# Patient Record
Sex: FEMALE | Race: WHITE | NOT HISPANIC OR LATINO | Employment: FULL TIME | URBAN - METROPOLITAN AREA
[De-identification: names, ages, dates, MRNs, and addresses within clinical notes are randomized per-mention and may not be internally consistent; named-entity substitution may affect disease eponyms.]

---

## 2018-11-07 ENCOUNTER — APPOINTMENT (OUTPATIENT)
Dept: CT IMAGING | Facility: HOSPITAL | Age: 28
End: 2018-11-07

## 2018-11-07 ENCOUNTER — HOSPITAL ENCOUNTER (EMERGENCY)
Facility: HOSPITAL | Age: 28
Discharge: HOME | End: 2018-11-07
Attending: EMERGENCY MEDICINE | Admitting: EMERGENCY MEDICINE

## 2018-11-07 VITALS
RESPIRATION RATE: 18 BRPM | TEMPERATURE: 97.6 F | WEIGHT: 172 LBS | BODY MASS INDEX: 28.66 KG/M2 | SYSTOLIC BLOOD PRESSURE: 118 MMHG | DIASTOLIC BLOOD PRESSURE: 69 MMHG | OXYGEN SATURATION: 99 % | HEART RATE: 95 BPM | HEIGHT: 65 IN

## 2018-11-07 DIAGNOSIS — E87.6 HYPOKALEMIA: ICD-10-CM

## 2018-11-07 DIAGNOSIS — R10.32 LEFT LOWER QUADRANT PAIN: Primary | ICD-10-CM

## 2018-11-07 LAB
ALBUMIN SERPL-MCNC: 4.2 G/DL (ref 3.5–5.2)
ALBUMIN/GLOB SERPL: 1.8 G/DL
ALP SERPL-CCNC: 90 U/L (ref 40–129)
ALT SERPL W P-5'-P-CCNC: 10 U/L (ref 5–33)
ANION GAP SERPL CALCULATED.3IONS-SCNC: 11.4 MMOL/L
AST SERPL-CCNC: 14 U/L (ref 5–32)
B-HCG UR QL: NEGATIVE
BACTERIA UR QL AUTO: ABNORMAL /HPF
BASOPHILS # BLD AUTO: 0.04 10*3/MM3 (ref 0–0.2)
BASOPHILS NFR BLD AUTO: 0.6 % (ref 0–2)
BILIRUB SERPL-MCNC: 0.6 MG/DL (ref 0.2–1.2)
BILIRUB UR QL STRIP: NEGATIVE
BUN BLD-MCNC: 5 MG/DL (ref 6–20)
BUN/CREAT SERPL: 6.5 (ref 7–25)
CALCIUM SPEC-SCNC: 8.7 MG/DL (ref 8.6–10.5)
CHLORIDE SERPL-SCNC: 97 MMOL/L (ref 98–107)
CLARITY UR: ABNORMAL
CO2 SERPL-SCNC: 26.6 MMOL/L (ref 22–29)
COLOR UR: ABNORMAL
CREAT BLD-MCNC: 0.77 MG/DL (ref 0.57–1)
DEPRECATED RDW RBC AUTO: 41.1 FL (ref 37–54)
EOSINOPHIL # BLD AUTO: 0.11 10*3/MM3 (ref 0.1–0.3)
EOSINOPHIL NFR BLD AUTO: 1.6 % (ref 0–4)
ERYTHROCYTE [DISTWIDTH] IN BLOOD BY AUTOMATED COUNT: 12.2 % (ref 11.5–14.5)
GFR SERPL CREATININE-BSD FRML MDRD: 91 ML/MIN/1.73
GLOBULIN UR ELPH-MCNC: 2.3 GM/DL
GLUCOSE BLD-MCNC: 84 MG/DL (ref 65–99)
GLUCOSE UR STRIP-MCNC: NEGATIVE MG/DL
HCT VFR BLD AUTO: 38.6 % (ref 37–47)
HGB BLD-MCNC: 12.6 G/DL (ref 12–16)
HGB UR QL STRIP.AUTO: NEGATIVE
HYALINE CASTS UR QL AUTO: ABNORMAL /LPF
IMM GRANULOCYTES # BLD: 0.02 10*3/MM3 (ref 0–0.03)
IMM GRANULOCYTES NFR BLD: 0.3 % (ref 0–0.5)
KETONES UR QL STRIP: ABNORMAL
LEUKOCYTE ESTERASE UR QL STRIP.AUTO: NEGATIVE
LYMPHOCYTES # BLD AUTO: 1.44 10*3/MM3 (ref 0.6–4.8)
LYMPHOCYTES NFR BLD AUTO: 21.1 % (ref 20–45)
MCH RBC QN AUTO: 29.8 PG (ref 27–31)
MCHC RBC AUTO-ENTMCNC: 32.6 G/DL (ref 31–37)
MCV RBC AUTO: 91.3 FL (ref 81–99)
MONOCYTES # BLD AUTO: 0.58 10*3/MM3 (ref 0–1)
MONOCYTES NFR BLD AUTO: 8.5 % (ref 3–8)
NEUTROPHILS # BLD AUTO: 4.64 10*3/MM3 (ref 1.5–8.3)
NEUTROPHILS NFR BLD AUTO: 67.9 % (ref 45–70)
NITRITE UR QL STRIP: NEGATIVE
NRBC BLD MANUAL-RTO: 0 /100 WBC (ref 0–0)
PH UR STRIP.AUTO: 7 [PH] (ref 4.5–8)
PLATELET # BLD AUTO: 247 10*3/MM3 (ref 140–500)
PMV BLD AUTO: 9.8 FL (ref 7.4–10.4)
POTASSIUM BLD-SCNC: 3.4 MMOL/L (ref 3.5–5.2)
PROT SERPL-MCNC: 6.5 G/DL (ref 6–8.5)
PROT UR QL STRIP: ABNORMAL
RBC # BLD AUTO: 4.23 10*6/MM3 (ref 4.2–5.4)
RBC # UR: ABNORMAL /HPF
REF LAB TEST METHOD: ABNORMAL
SODIUM BLD-SCNC: 135 MMOL/L (ref 136–145)
SP GR UR STRIP: 1.02 (ref 1–1.03)
SQUAMOUS #/AREA URNS HPF: ABNORMAL /HPF
UROBILINOGEN UR QL STRIP: ABNORMAL
WBC NRBC COR # BLD: 6.83 10*3/MM3 (ref 4.8–10.8)
WBC UR QL AUTO: ABNORMAL /HPF

## 2018-11-07 PROCEDURE — 85025 COMPLETE CBC W/AUTO DIFF WBC: CPT | Performed by: PHYSICIAN ASSISTANT

## 2018-11-07 PROCEDURE — 0 IOPAMIDOL PER 1 ML: Performed by: EMERGENCY MEDICINE

## 2018-11-07 PROCEDURE — 99284 EMERGENCY DEPT VISIT MOD MDM: CPT

## 2018-11-07 PROCEDURE — 99284 EMERGENCY DEPT VISIT MOD MDM: CPT | Performed by: PHYSICIAN ASSISTANT

## 2018-11-07 PROCEDURE — 80053 COMPREHEN METABOLIC PANEL: CPT | Performed by: PHYSICIAN ASSISTANT

## 2018-11-07 PROCEDURE — 74177 CT ABD & PELVIS W/CONTRAST: CPT

## 2018-11-07 PROCEDURE — 81025 URINE PREGNANCY TEST: CPT | Performed by: PHYSICIAN ASSISTANT

## 2018-11-07 PROCEDURE — 81001 URINALYSIS AUTO W/SCOPE: CPT | Performed by: PHYSICIAN ASSISTANT

## 2018-11-07 RX ORDER — POTASSIUM CHLORIDE 20 MEQ/1
20 TABLET, EXTENDED RELEASE ORAL DAILY
Status: DISCONTINUED | OUTPATIENT
Start: 2018-11-07 | End: 2018-11-07 | Stop reason: HOSPADM

## 2018-11-07 RX ORDER — VENLAFAXINE 25 MG/1
TABLET ORAL DAILY
COMMUNITY
End: 2018-11-07

## 2018-11-07 RX ORDER — BUPROPION HYDROCHLORIDE 75 MG/1
75 TABLET ORAL DAILY
COMMUNITY
End: 2020-04-13

## 2018-11-07 RX ORDER — NAPROXEN 375 MG/1
375 TABLET ORAL 2 TIMES DAILY PRN
Qty: 20 TABLET | Refills: 0 | Status: SHIPPED | OUTPATIENT
Start: 2018-11-07 | End: 2020-04-13

## 2018-11-07 RX ORDER — SODIUM CHLORIDE 0.9 % (FLUSH) 0.9 %
10 SYRINGE (ML) INJECTION AS NEEDED
Status: DISCONTINUED | OUTPATIENT
Start: 2018-11-07 | End: 2018-11-07 | Stop reason: HOSPADM

## 2018-11-07 RX ORDER — VENLAFAXINE HYDROCHLORIDE 75 MG/1
75 CAPSULE, EXTENDED RELEASE ORAL EVERY MORNING
COMMUNITY
End: 2022-05-25

## 2018-11-07 RX ADMIN — IOPAMIDOL 100 ML: 755 INJECTION, SOLUTION INTRAVENOUS at 15:08

## 2018-11-07 RX ADMIN — POTASSIUM CHLORIDE 20 MEQ: 1500 TABLET, EXTENDED RELEASE ORAL at 16:26

## 2018-11-07 NOTE — ED PROVIDER NOTES
Subjective   History of Present Illness  History of Present Illness    Chief complaint: abd pain    Location: LLQ with radiation to low back    Quality/Severity:  Ache, moderate    Timing/Duration: 0730 this am    Modifying Factors: Nothing specific makes worse or better    Associated Symptoms: Positive nausea, no vomiting.  No change in bowels.  Last bowel movement was 2 days ago, which is not abnormal.  Denies dysuria, hematuria, frequency or urgency.  Patient has not yet urinated today.  Denies any rectal bleeding.  Denies fevers or chills.    Narrative: 27-year-old female presents with lower abdominal pain that started this morning.  She was wondering if maybe she had an ovarian cyst or a kidney stone.  She stated the pain became so severe that she had to stop working and comes to the ER.  She denies any trauma.    Review of Systems  General: Denies fevers or chills.  Denies any weakness or fatigue.  Denies any weight loss or weight gain.  SKIN: Denies any rashes lesions or ulcers.  Denies color change.  ENT: Denies sore throat or rhinorrhea.  Denies ear pain.    EYES: Denies any blurred vision.  Denies any change in vision.  Denies any photophobia.  Denies any vision loss.  LUNGS: Denies any shortness of breath or wheezing.  Denies any cough.  Denies any hemoptysis.  CARDIAC: Denies any chest pain.  Denies palpitations.  Denies syncope.  Denies any edema  ABD: +abdominal pain. + nausea.  Denies vomiting or diarrhea.  Denies any rectal bleeding.  Denies constipation  : Denies any dysuria, urgency, frequency or hematuria.  Denies discharge.  Denies flank pain.  NEURO: Denies any focal weakness.  Denies headache.  Denies seizures.  Denies changes in speech or difficulty walking.  ENDOCRINE: Denies polydipsia and polyuria  M/S: back pain as above. Denies arthralgias, myalgias or neck pain  HEME/LYMPH: Negative for adenopathy. Does not bruise/bleed easily.   PSYCH: Negative for suicidal ideas. Denies anxiety or  depression  review was performed in addition to those in the above all other reviews are negative.      Past Medical History:   Diagnosis Date   • Anxiety    • Depression        No Known Allergies    Past Surgical History:   Procedure Laterality Date   • TONSILLECTOMY         History reviewed. No pertinent family history.    Social History     Social History   • Marital status:      Social History Main Topics   • Smoking status: Current Every Day Smoker     Packs/day: 0.50   • Smokeless tobacco: Never Used   • Alcohol use Yes      Comment: monthly    • Drug use: No   • Sexual activity: Yes     Other Topics Concern   • Not on file     No current facility-administered medications for this encounter.     Current Outpatient Prescriptions:   •  buPROPion (WELLBUTRIN) 75 MG tablet, Take 75 mg by mouth Daily. Pt unsure if this is correct dosage, Disp: , Rfl:   •  venlafaxine XR (EFFEXOR-XR) 75 MG 24 hr capsule, Take 75 mg by mouth Daily., Disp: , Rfl:         Objective   Physical Exam  Vitals:    11/07/18 1345   BP: 116/75   Pulse: 91   Resp: 15   Temp: 97.6 °F (36.4 °C)   SpO2: 98%     GENERAL: Alert and oriented ×4.  No apparent distress.  SKIN: Warm, pink and dry  HEENT: Atraumatic normocephalic  LUNGS: Clear to auscultation bilaterally without wheezes, rales or rhonchi  CARDIAC: Regular rate and rhythm.  S1 and S2.  No murmurs, rubs or gallops.  ABD: Soft, positive suprapubic and severe left lower quadrant abdominal pain. + mild voluntary guarding LLQ. No rebound tenderness.  No CVA tenderness.  Normal bowel sounds.  M/S: MAEW, no deformity  PSYCH: Normal mood and affect      Procedures           ED Course  ED Course as of Nov 07 1442 Wed Nov 07, 2018   1441 contaminated Squamous Epithelial Cells, UA: (!) 13-20 [[place]]      ED Course User Index  [[place]] Carin Llamas, PAJoriC        Results for orders placed or performed during the hospital encounter of 11/07/18   Urinalysis With Microscopic If Indicated (No  Culture) - Urine, Clean Catch   Result Value Ref Range    Color, UA Dark Yellow (A) Yellow, Straw    Appearance, UA Slightly Cloudy (A) Clear    pH, UA 7.0 4.5 - 8.0    Specific Gravity, UA 1.020 1.003 - 1.030    Glucose, UA Negative Negative    Ketones, UA Trace (A) Negative, 80 mg/dL (3+), >=160 mg/dL (4+)    Bilirubin, UA Negative Negative    Blood, UA Negative Negative    Protein, UA 30 mg/dL (1+) (A) Negative    Leuk Esterase, UA Negative Negative    Nitrite, UA Negative Negative    Urobilinogen, UA 0.2 E.U./dL 0.2 - 1.0 E.U./dL   Pregnancy, Urine - Urine, Clean Catch   Result Value Ref Range    HCG, Urine QL Negative Negative   Urinalysis, Microscopic Only - Urine, Clean Catch   Result Value Ref Range    RBC, UA None Seen None Seen /HPF    WBC, UA None Seen None Seen /HPF    Bacteria, UA 1+ (A) None Seen /HPF    Squamous Epithelial Cells, UA 13-20 (A) None Seen, 0-2 /HPF    Hyaline Casts, UA None Seen None Seen /LPF    Methodology Manual Light Microscopy    Comprehensive Metabolic Panel   Result Value Ref Range    Glucose 84 65 - 99 mg/dL    BUN 5 (L) 6 - 20 mg/dL    Creatinine 0.77 0.57 - 1.00 mg/dL    Sodium 135 (L) 136 - 145 mmol/L    Potassium 3.4 (L) 3.5 - 5.2 mmol/L    Chloride 97 (L) 98 - 107 mmol/L    CO2 26.6 22.0 - 29.0 mmol/L    Calcium 8.7 8.6 - 10.5 mg/dL    Total Protein 6.5 6.0 - 8.5 g/dL    Albumin 4.20 3.50 - 5.20 g/dL    ALT (SGPT) 10 5 - 33 U/L    AST (SGOT) 14 5 - 32 U/L    Alkaline Phosphatase 90 40 - 129 U/L    Total Bilirubin 0.6 0.2 - 1.2 mg/dL    eGFR Non African Amer 91 >60 mL/min/1.73    Globulin 2.3 gm/dL    A/G Ratio 1.8 g/dL    BUN/Creatinine Ratio 6.5 (L) 7.0 - 25.0    Anion Gap 11.4 mmol/L   CBC Auto Differential   Result Value Ref Range    WBC 6.83 4.80 - 10.80 10*3/mm3    RBC 4.23 4.20 - 5.40 10*6/mm3    Hemoglobin 12.6 12.0 - 16.0 g/dL    Hematocrit 38.6 37.0 - 47.0 %    MCV 91.3 81.0 - 99.0 fL    MCH 29.8 27.0 - 31.0 pg    MCHC 32.6 31.0 - 37.0 g/dL    RDW 12.2 11.5 - 14.5 %     RDW-SD 41.1 37.0 - 54.0 fl    MPV 9.8 7.4 - 10.4 fL    Platelets 247 140 - 500 10*3/mm3    Neutrophil % 67.9 45.0 - 70.0 %    Lymphocyte % 21.1 20.0 - 45.0 %    Monocyte % 8.5 (H) 3.0 - 8.0 %    Eosinophil % 1.6 0.0 - 4.0 %    Basophil % 0.6 0.0 - 2.0 %    Immature Grans % 0.3 0.0 - 0.5 %    Neutrophils, Absolute 4.64 1.50 - 8.30 10*3/mm3    Lymphocytes, Absolute 1.44 0.60 - 4.80 10*3/mm3    Monocytes, Absolute 0.58 0.00 - 1.00 10*3/mm3    Eosinophils, Absolute 0.11 0.10 - 0.30 10*3/mm3    Basophils, Absolute 0.04 0.00 - 0.20 10*3/mm3    Immature Grans, Absolute 0.02 0.00 - 0.03 10*3/mm3    nRBC 0.0 0.0 - 0.0 /100 WBC     Reviewed CT a/p. Independently viewed by me. Interpreted by radiologist. Discussed with pt.  Ct Abdomen Pelvis With Contrast    Result Date: 11/7/2018  Narrative: CT ABDOMEN AND PELVIS WITH CONTRAST, 11/7/2018  HISTORY: 27-year-old female in the ED complaining of new onset left side abdomen pain beginning earlier today.  TECHNIQUE: CT examination of the abdomen and pelvis with IV contrast. GI contrast was not ordered. Radiation dose reduction techniques included automated exposure control or exposure modulation based on body size. Radiation audit for CT and nuclear cardiology exams in the last 12 months: 0.  ABDOMEN FINDINGS: Liver, pancreas and spleen are normal in size and appearance. No gallbladder distention or bile duct dilatation.  Both kidneys are normal in appearance with normal parenchymal enhancement and no evidence of urinary obstruction.  Small bowel and colon are normal in caliber and appearance, as imaged. Normal appendix.  PELVIS FINDINGS: Uterus, ovaries, urinary bladder and rectum are within normal limits. No mass, adenopathy or fluid collection within the abdomen or pelvis. No inguinal hernia.  Limited images through the lower chest show subsegmental airspace infiltrate in the lateral segment right middle lobe and the posterior basal segment left lower lobe. Correlate for  clinical and laboratory evidence of pneumonitis. No pleural effusion.      Impression: 1. Focal airspace infiltrates in the right middle lobe and posterior left lower lobe. Correlate for clinical and laboratory evidence of infectious pneumonitis. No pleural effusion. 2. Negative CT imaging of the abdomen and pelvis.  This report was finalized on 11/7/2018 3:58 PM by Dr. Jed Almanzar MD.      1618- improved. No cough, so doubt pna.  Pt declines vag exam, no risk for std.  in monogamous relationship.  Kcl given.    Discussed pertinent labs and imaging findings with the patient/family.  Patient/Family voiced understanding of need to follow-up for recheck, further testing as needed.  Return to the emergency Department warnings were given.  D/c naproxen.          MDM  Number of Diagnoses or Management Options  Hypokalemia: new and requires workup  Left lower quadrant pain: new and requires workup     Amount and/or Complexity of Data Reviewed  Clinical lab tests: reviewed and ordered  Tests in the radiology section of CPT®: reviewed and ordered  Tests in the medicine section of CPT®: reviewed and ordered  Independent visualization of images, tracings, or specimens: yes    Risk of Complications, Morbidity, and/or Mortality  Presenting problems: moderate  Diagnostic procedures: moderate  Management options: moderate    Patient Progress  Patient progress: improved        Final diagnoses:   Left lower quadrant pain     Dictated utilizing Dragon dictation         Carin Llamas, PA-C  11/07/18 2157

## 2020-07-06 ENCOUNTER — OFFICE VISIT (OUTPATIENT)
Dept: SURGERY | Facility: CLINIC | Age: 30
End: 2020-07-06

## 2020-07-06 VITALS
BODY MASS INDEX: 32.1 KG/M2 | WEIGHT: 188 LBS | SYSTOLIC BLOOD PRESSURE: 128 MMHG | DIASTOLIC BLOOD PRESSURE: 58 MMHG | HEIGHT: 64 IN

## 2020-07-06 DIAGNOSIS — L05.91 PILONIDAL CYST: Primary | ICD-10-CM

## 2020-07-06 PROCEDURE — 99203 OFFICE O/P NEW LOW 30 MIN: CPT | Performed by: SURGERY

## 2020-07-06 RX ORDER — OMEPRAZOLE 20 MG/1
20 CAPSULE, DELAYED RELEASE ORAL EVERY MORNING
COMMUNITY

## 2020-07-06 NOTE — H&P
PATIENT INFORMATION  Cydney Soto       - 1990    CHIEF COMPLAINT  Chief Complaint   Patient presents with   • Cyst   pilonidal cyst. Is on Bactrim.    HISTORY OF PRESENT ILLNESS  HPI she complains of pain and swelling in her racquel cleft.  She says she has had this problems 2 times before that resolved spontaneously without any intervention.  She says the area got worse and went to the emergency room on Wednesday and was started on Bactrim.  She denies any fevers or chills or drainage from the area.  She is a former smoker.        REVIEW OF SYSTEMS  Review of Systems all other organ systems reviewed and are negative      ACTIVE PROBLEMS  There are no active problems to display for this patient.        PAST MEDICAL HISTORY  Past Medical History:   Diagnosis Date   • Anxiety    • Depression    • Migraine          SURGICAL HISTORY  Past Surgical History:   Procedure Laterality Date   • TONSILLECTOMY           FAMILY HISTORY  Family History   Problem Relation Age of Onset   • Depression Mother    • Alcohol abuse Father    • Learning disabilities Sister    • Heart disease Maternal Grandfather          SOCIAL HISTORY  Social History     Occupational History   • Not on file   Tobacco Use   • Smoking status: Former Smoker     Packs/day: 0.00     Last attempt to quit: 2020     Years since quittin.0   • Smokeless tobacco: Former User   Substance and Sexual Activity   • Alcohol use: Yes     Comment: monthly    • Drug use: No   • Sexual activity: Yes         CURRENT MEDICATIONS    Current Outpatient Medications:   •  buPROPion XL (WELLBUTRIN XL) 150 MG 24 hr tablet, bupropion HCl  mg 24 hr tablet, extended release, Disp: , Rfl:   •  omeprazole (priLOSEC) 20 MG capsule, Take 20 mg by mouth Daily., Disp: , Rfl:   •  sulfamethoxazole-trimethoprim (BACTRIM DS,SEPTRA DS) 800-160 MG per tablet, Take 1 tablet by mouth 2 (Two) Times a Day for 10 days., Disp: 20 tablet, Rfl: 0  •  venlafaxine XR  "(EFFEXOR-XR) 75 MG 24 hr capsule, Take 75 mg by mouth Daily., Disp: , Rfl:     ALLERGIES  Penicillins    VITALS  Vitals:    20 1512   BP: 128/58   Weight: 85.3 kg (188 lb)   Height: 162.6 cm (64\")       LAST RESULTS   Admission on 2020, Discharged on 2020   Component Date Value Ref Range Status   • Rapid Influenza A Ag 2020 Negative  Negative Final   • Rapid Influenza B Ag 2020 Negative  Negative Final   • Internal Control 2020 Passed  Passed Final   • Lot Number [id number]   Final   • Expiration Date 2020   Final   • SARS-CoV-2, AMANDA 2020 Not Detected  Not Detected Final    Testing was performed using the joslyn(R) SARS-CoV-2 test.  This test was developed and its performance characteristics determined  by [employer]. This test has not been FDA cleared or  approved. This test has been authorized by FDA under an Emergency Use  Authorization (EUA). This test is only authorized for the duration of  time the declaration that circumstances exist justifying the  authorization of the emergency use of in vitro diagnostic tests for  detection of SARS-CoV-2 virus and/or diagnosis of COVID-19 infection  under section 564(b)(1) of the Act, 21 U.S.C. 360bbb-3(b)(1), unless  the authorization is terminated or revoked sooner.     No results found.    PHYSICAL EXAM  Debilities/Disabilities Identified:   Emotional Behavior:   Physical Exam alert white female in no active distress.  She is oriented x3 her heart shows a regular rate and rhythm her lungs are clear and equal.  In her racquel cleft she has 1 cm area of induration with out drainable fluctuance.  I reviewed her records from the urgent care center and she was started on Bactrim.    ASSESSMENT  Pilonidal cyst      PLAN  The risk benefits and options were discussed with her in detail.  We will proceed with excision of the cyst with primary closure at [facility] on .      "

## 2020-07-06 NOTE — PROGRESS NOTES
PATIENT INFORMATION  Cydney Soto       - 1990    CHIEF COMPLAINT  Chief Complaint   Patient presents with   • Cyst   pilonidal cyst. Is on Bactrim.    HISTORY OF PRESENT ILLNESS  HPI she complains of pain and swelling in her racquel cleft.  She says she has had this problems 2 times before that resolved spontaneously without any intervention.  She says the area got worse and went to the emergency room on Wednesday and was started on Bactrim.  She denies any fevers or chills or drainage from the area.  She is a former smoker.        REVIEW OF SYSTEMS  Review of Systems all other organ systems reviewed and are negative      ACTIVE PROBLEMS  There are no active problems to display for this patient.        PAST MEDICAL HISTORY  Past Medical History:   Diagnosis Date   • Anxiety    • Depression    • Migraine          SURGICAL HISTORY  Past Surgical History:   Procedure Laterality Date   • TONSILLECTOMY           FAMILY HISTORY  Family History   Problem Relation Age of Onset   • Depression Mother    • Alcohol abuse Father    • Learning disabilities Sister    • Heart disease Maternal Grandfather          SOCIAL HISTORY  Social History     Occupational History   • Not on file   Tobacco Use   • Smoking status: Former Smoker     Packs/day: 0.00     Last attempt to quit: 2020     Years since quittin.0   • Smokeless tobacco: Former User   Substance and Sexual Activity   • Alcohol use: Yes     Comment: monthly    • Drug use: No   • Sexual activity: Yes         CURRENT MEDICATIONS    Current Outpatient Medications:   •  buPROPion XL (WELLBUTRIN XL) 150 MG 24 hr tablet, bupropion HCl  mg 24 hr tablet, extended release, Disp: , Rfl:   •  omeprazole (priLOSEC) 20 MG capsule, Take 20 mg by mouth Daily., Disp: , Rfl:   •  sulfamethoxazole-trimethoprim (BACTRIM DS,SEPTRA DS) 800-160 MG per tablet, Take 1 tablet by mouth 2 (Two) Times a Day for 10 days., Disp: 20 tablet, Rfl: 0  •  venlafaxine XR  "(EFFEXOR-XR) 75 MG 24 hr capsule, Take 75 mg by mouth Daily., Disp: , Rfl:     ALLERGIES  Penicillins    VITALS  Vitals:    20 1512   BP: 128/58   Weight: 85.3 kg (188 lb)   Height: 162.6 cm (64\")       LAST RESULTS   Admission on 2020, Discharged on 2020   Component Date Value Ref Range Status   • Rapid Influenza A Ag 2020 Negative  Negative Final   • Rapid Influenza B Ag 2020 Negative  Negative Final   • Internal Control 2020 Passed  Passed Final   • Lot Number [id number]   Final   • Expiration Date 2020   Final   • SARS-CoV-2, AMANDA 2020 Not Detected  Not Detected Final    Testing was performed using the joslyn(R) SARS-CoV-2 test.  This test was developed and its performance characteristics determined  by [employer]. This test has not been FDA cleared or  approved. This test has been authorized by FDA under an Emergency Use  Authorization (EUA). This test is only authorized for the duration of  time the declaration that circumstances exist justifying the  authorization of the emergency use of in vitro diagnostic tests for  detection of SARS-CoV-2 virus and/or diagnosis of COVID-19 infection  under section 564(b)(1) of the Act, 21 U.S.C. 360bbb-3(b)(1), unless  the authorization is terminated or revoked sooner.     No results found.    PHYSICAL EXAM  Debilities/Disabilities Identified:   Emotional Behavior:   Physical Exam alert white female in no active distress.  She is oriented x3 her heart shows a regular rate and rhythm her lungs are clear and equal.  In her racquel cleft she has 1 cm area of induration with out drainable fluctuance.  I reviewed her records from the urgent care center and she was started on Bactrim.    ASSESSMENT  Pilonidal cyst      PLAN  The risk benefits and options were discussed with her in detail.  We will proceed with excision of the cyst with primary closure at [facility] on .      "

## 2020-07-06 NOTE — H&P (VIEW-ONLY)
PATIENT INFORMATION  Cydney Soto       - 1990    CHIEF COMPLAINT  Chief Complaint   Patient presents with   • Cyst   pilonidal cyst. Is on Bactrim.    HISTORY OF PRESENT ILLNESS  HPI she complains of pain and swelling in her racquel cleft.  She says she has had this problems 2 times before that resolved spontaneously without any intervention.  She says the area got worse and went to the emergency room on Wednesday and was started on Bactrim.  She denies any fevers or chills or drainage from the area.  She is a former smoker.        REVIEW OF SYSTEMS  Review of Systems all other organ systems reviewed and are negative      ACTIVE PROBLEMS  There are no active problems to display for this patient.        PAST MEDICAL HISTORY  Past Medical History:   Diagnosis Date   • Anxiety    • Depression    • Migraine          SURGICAL HISTORY  Past Surgical History:   Procedure Laterality Date   • TONSILLECTOMY           FAMILY HISTORY  Family History   Problem Relation Age of Onset   • Depression Mother    • Alcohol abuse Father    • Learning disabilities Sister    • Heart disease Maternal Grandfather          SOCIAL HISTORY  Social History     Occupational History   • Not on file   Tobacco Use   • Smoking status: Former Smoker     Packs/day: 0.00     Last attempt to quit: 2020     Years since quittin.0   • Smokeless tobacco: Former User   Substance and Sexual Activity   • Alcohol use: Yes     Comment: monthly    • Drug use: No   • Sexual activity: Yes         CURRENT MEDICATIONS    Current Outpatient Medications:   •  buPROPion XL (WELLBUTRIN XL) 150 MG 24 hr tablet, bupropion HCl  mg 24 hr tablet, extended release, Disp: , Rfl:   •  omeprazole (priLOSEC) 20 MG capsule, Take 20 mg by mouth Daily., Disp: , Rfl:   •  sulfamethoxazole-trimethoprim (BACTRIM DS,SEPTRA DS) 800-160 MG per tablet, Take 1 tablet by mouth 2 (Two) Times a Day for 10 days., Disp: 20 tablet, Rfl: 0  •  venlafaxine XR  "(EFFEXOR-XR) 75 MG 24 hr capsule, Take 75 mg by mouth Daily., Disp: , Rfl:     ALLERGIES  Penicillins    VITALS  Vitals:    20 1512   BP: 128/58   Weight: 85.3 kg (188 lb)   Height: 162.6 cm (64\")       LAST RESULTS   Admission on 2020, Discharged on 2020   Component Date Value Ref Range Status   • Rapid Influenza A Ag 2020 Negative  Negative Final   • Rapid Influenza B Ag 2020 Negative  Negative Final   • Internal Control 2020 Passed  Passed Final   • Lot Number [id number]   Final   • Expiration Date 2020   Final   • SARS-CoV-2, AMANDA 2020 Not Detected  Not Detected Final    Testing was performed using the joslyn(R) SARS-CoV-2 test.  This test was developed and its performance characteristics determined  by [employer]. This test has not been FDA cleared or  approved. This test has been authorized by FDA under an Emergency Use  Authorization (EUA). This test is only authorized for the duration of  time the declaration that circumstances exist justifying the  authorization of the emergency use of in vitro diagnostic tests for  detection of SARS-CoV-2 virus and/or diagnosis of COVID-19 infection  under section 564(b)(1) of the Act, 21 U.S.C. 360bbb-3(b)(1), unless  the authorization is terminated or revoked sooner.     No results found.    PHYSICAL EXAM  Debilities/Disabilities Identified:   Emotional Behavior:   Physical Exam alert white female in no active distress.  She is oriented x3 her heart shows a regular rate and rhythm her lungs are clear and equal.  In her racquel cleft she has 1 cm area of induration with out drainable fluctuance.  I reviewed her records from the urgent care center and she was started on Bactrim.    ASSESSMENT  Pilonidal cyst      PLAN  The risk benefits and options were discussed with her in detail.  We will proceed with excision of the cyst with primary closure at [facility] on .      "

## 2020-07-11 ENCOUNTER — TRANSCRIBE ORDERS (OUTPATIENT)
Dept: ADMINISTRATIVE | Facility: HOSPITAL | Age: 30
End: 2020-07-11

## 2020-07-11 DIAGNOSIS — Z01.818 OTHER SPECIFIED PRE-OPERATIVE EXAMINATION: Primary | ICD-10-CM

## 2020-07-13 NOTE — PAT
PAT call made, pre-op instructions reviewed, including importance of staying quarantined after covid test. covid test scheduled for 07/15.

## 2020-07-15 ENCOUNTER — LAB (OUTPATIENT)
Dept: LAB | Facility: HOSPITAL | Age: 30
End: 2020-07-15

## 2020-07-15 DIAGNOSIS — L05.91 PILONIDAL CYST: ICD-10-CM

## 2020-07-15 DIAGNOSIS — Z01.818 OTHER SPECIFIED PRE-OPERATIVE EXAMINATION: ICD-10-CM

## 2020-07-15 LAB
DEPRECATED RDW RBC AUTO: 45 FL (ref 37–54)
ERYTHROCYTE [DISTWIDTH] IN BLOOD BY AUTOMATED COUNT: 14.8 % (ref 12.3–15.4)
HCG SERPL QL: NEGATIVE
HCT VFR BLD AUTO: 32.5 % (ref 34–46.6)
HGB BLD-MCNC: 10.3 G/DL (ref 12–15.9)
MCH RBC QN AUTO: 26.2 PG (ref 26.6–33)
MCHC RBC AUTO-ENTMCNC: 31.7 G/DL (ref 31.5–35.7)
MCV RBC AUTO: 82.7 FL (ref 79–97)
PLATELET # BLD AUTO: 343 10*3/MM3 (ref 140–450)
PMV BLD AUTO: 9.6 FL (ref 6–12)
RBC # BLD AUTO: 3.93 10*6/MM3 (ref 3.77–5.28)
WBC # BLD AUTO: 5.73 10*3/MM3 (ref 3.4–10.8)

## 2020-07-15 PROCEDURE — U0002 COVID-19 LAB TEST NON-CDC: HCPCS

## 2020-07-15 PROCEDURE — C9803 HOPD COVID-19 SPEC COLLECT: HCPCS

## 2020-07-15 PROCEDURE — 36415 COLL VENOUS BLD VENIPUNCTURE: CPT

## 2020-07-15 PROCEDURE — 84703 CHORIONIC GONADOTROPIN ASSAY: CPT

## 2020-07-15 PROCEDURE — U0004 COV-19 TEST NON-CDC HGH THRU: HCPCS

## 2020-07-15 PROCEDURE — 85027 COMPLETE CBC AUTOMATED: CPT

## 2020-07-16 ENCOUNTER — ANESTHESIA EVENT (OUTPATIENT)
Dept: PERIOP | Facility: HOSPITAL | Age: 30
End: 2020-07-16

## 2020-07-16 LAB
REF LAB TEST METHOD: NORMAL
SARS-COV-2 RNA RESP QL NAA+PROBE: NOT DETECTED

## 2020-07-17 ENCOUNTER — HOSPITAL ENCOUNTER (OUTPATIENT)
Facility: HOSPITAL | Age: 30
Setting detail: HOSPITAL OUTPATIENT SURGERY
Discharge: HOME | End: 2020-07-17
Attending: SURGERY | Admitting: SURGERY

## 2020-07-17 ENCOUNTER — ANESTHESIA (OUTPATIENT)
Dept: PERIOP | Facility: HOSPITAL | Age: 30
End: 2020-07-17

## 2020-07-17 VITALS
OXYGEN SATURATION: 97 % | DIASTOLIC BLOOD PRESSURE: 63 MMHG | SYSTOLIC BLOOD PRESSURE: 115 MMHG | WEIGHT: 192 LBS | TEMPERATURE: 99.1 F | BODY MASS INDEX: 32.78 KG/M2 | HEART RATE: 98 BPM | RESPIRATION RATE: 15 BRPM | HEIGHT: 64 IN

## 2020-07-17 DIAGNOSIS — L05.91 PILONIDAL CYST: ICD-10-CM

## 2020-07-17 PROCEDURE — 25010000002 ONDANSETRON PER 1 MG: Performed by: NURSE ANESTHETIST, CERTIFIED REGISTERED

## 2020-07-17 PROCEDURE — 25010000002 SUCCINYLCHOLINE PER 20 MG: Performed by: NURSE ANESTHETIST, CERTIFIED REGISTERED

## 2020-07-17 PROCEDURE — 25010000002 KETOROLAC TROMETHAMINE PER 15 MG: Performed by: NURSE ANESTHETIST, CERTIFIED REGISTERED

## 2020-07-17 PROCEDURE — 25010000002 FENTANYL CITRATE (PF) 100 MCG/2ML SOLUTION: Performed by: NURSE ANESTHETIST, CERTIFIED REGISTERED

## 2020-07-17 PROCEDURE — 25010000002 NEOSTIGMINE 10 MG/10ML SOLUTION: Performed by: NURSE ANESTHETIST, CERTIFIED REGISTERED

## 2020-07-17 PROCEDURE — 25010000002 PROPOFOL 10 MG/ML EMULSION: Performed by: NURSE ANESTHETIST, CERTIFIED REGISTERED

## 2020-07-17 PROCEDURE — 11770 EXC PILONIDAL CYST SIMPLE: CPT | Performed by: SURGERY

## 2020-07-17 PROCEDURE — 25010000002 HYDROMORPHONE PER 4 MG: Performed by: NURSE ANESTHETIST, CERTIFIED REGISTERED

## 2020-07-17 PROCEDURE — 88304 TISSUE EXAM BY PATHOLOGIST: CPT | Performed by: SURGERY

## 2020-07-17 PROCEDURE — 25010000002 DEXAMETHASONE PER 1 MG: Performed by: NURSE ANESTHETIST, CERTIFIED REGISTERED

## 2020-07-17 RX ORDER — SODIUM CHLORIDE 0.9 % (FLUSH) 0.9 %
10 SYRINGE (ML) INJECTION EVERY 12 HOURS SCHEDULED
Status: DISCONTINUED | OUTPATIENT
Start: 2020-07-17 | End: 2020-07-17 | Stop reason: HOSPADM

## 2020-07-17 RX ORDER — DEXMEDETOMIDINE HYDROCHLORIDE 100 UG/ML
INJECTION, SOLUTION INTRAVENOUS AS NEEDED
Status: DISCONTINUED | OUTPATIENT
Start: 2020-07-17 | End: 2020-07-17 | Stop reason: SURG

## 2020-07-17 RX ORDER — MIDAZOLAM HYDROCHLORIDE 2 MG/2ML
1 INJECTION, SOLUTION INTRAMUSCULAR; INTRAVENOUS
Status: DISCONTINUED | OUTPATIENT
Start: 2020-07-17 | End: 2020-07-17 | Stop reason: HOSPADM

## 2020-07-17 RX ORDER — NEOSTIGMINE METHYLSULFATE 1 MG/ML
INJECTION, SOLUTION INTRAVENOUS AS NEEDED
Status: DISCONTINUED | OUTPATIENT
Start: 2020-07-17 | End: 2020-07-17 | Stop reason: SURG

## 2020-07-17 RX ORDER — LIDOCAINE HYDROCHLORIDE 20 MG/ML
INJECTION, SOLUTION INFILTRATION; PERINEURAL AS NEEDED
Status: DISCONTINUED | OUTPATIENT
Start: 2020-07-17 | End: 2020-07-17 | Stop reason: SURG

## 2020-07-17 RX ORDER — SODIUM CHLORIDE, SODIUM LACTATE, POTASSIUM CHLORIDE, CALCIUM CHLORIDE 600; 310; 30; 20 MG/100ML; MG/100ML; MG/100ML; MG/100ML
100 INJECTION, SOLUTION INTRAVENOUS CONTINUOUS
Status: DISCONTINUED | OUTPATIENT
Start: 2020-07-17 | End: 2020-07-17 | Stop reason: HOSPADM

## 2020-07-17 RX ORDER — SUCCINYLCHOLINE CHLORIDE 20 MG/ML
INJECTION INTRAMUSCULAR; INTRAVENOUS AS NEEDED
Status: DISCONTINUED | OUTPATIENT
Start: 2020-07-17 | End: 2020-07-17 | Stop reason: SURG

## 2020-07-17 RX ORDER — ROCURONIUM BROMIDE 10 MG/ML
INJECTION, SOLUTION INTRAVENOUS AS NEEDED
Status: DISCONTINUED | OUTPATIENT
Start: 2020-07-17 | End: 2020-07-17 | Stop reason: SURG

## 2020-07-17 RX ORDER — ONDANSETRON 2 MG/ML
4 INJECTION INTRAMUSCULAR; INTRAVENOUS ONCE AS NEEDED
Status: DISCONTINUED | OUTPATIENT
Start: 2020-07-17 | End: 2020-07-17 | Stop reason: HOSPADM

## 2020-07-17 RX ORDER — ACETAMINOPHEN 500 MG
1000 TABLET ORAL ONCE
Status: COMPLETED | OUTPATIENT
Start: 2020-07-17 | End: 2020-07-17

## 2020-07-17 RX ORDER — OXYCODONE HYDROCHLORIDE AND ACETAMINOPHEN 5; 325 MG/1; MG/1
1 TABLET ORAL ONCE AS NEEDED
Status: DISCONTINUED | OUTPATIENT
Start: 2020-07-17 | End: 2020-07-17 | Stop reason: HOSPADM

## 2020-07-17 RX ORDER — OXYCODONE AND ACETAMINOPHEN 7.5; 325 MG/1; MG/1
1 TABLET ORAL ONCE AS NEEDED
Status: DISCONTINUED | OUTPATIENT
Start: 2020-07-17 | End: 2020-07-17 | Stop reason: HOSPADM

## 2020-07-17 RX ORDER — ONDANSETRON 2 MG/ML
4 INJECTION INTRAMUSCULAR; INTRAVENOUS ONCE AS NEEDED
Status: COMPLETED | OUTPATIENT
Start: 2020-07-17 | End: 2020-07-17

## 2020-07-17 RX ORDER — OXYCODONE HYDROCHLORIDE AND ACETAMINOPHEN 5; 325 MG/1; MG/1
1-2 TABLET ORAL EVERY 4 HOURS PRN
Qty: 30 TABLET | Refills: 0 | Status: SHIPPED | OUTPATIENT
Start: 2020-07-17 | End: 2020-07-27

## 2020-07-17 RX ORDER — MAGNESIUM HYDROXIDE 1200 MG/15ML
LIQUID ORAL AS NEEDED
Status: DISCONTINUED | OUTPATIENT
Start: 2020-07-17 | End: 2020-07-17 | Stop reason: HOSPADM

## 2020-07-17 RX ORDER — FENTANYL CITRATE 50 UG/ML
INJECTION, SOLUTION INTRAMUSCULAR; INTRAVENOUS AS NEEDED
Status: DISCONTINUED | OUTPATIENT
Start: 2020-07-17 | End: 2020-07-17 | Stop reason: SURG

## 2020-07-17 RX ORDER — SODIUM CHLORIDE 9 MG/ML
40 INJECTION, SOLUTION INTRAVENOUS AS NEEDED
Status: DISCONTINUED | OUTPATIENT
Start: 2020-07-17 | End: 2020-07-17 | Stop reason: HOSPADM

## 2020-07-17 RX ORDER — KETOROLAC TROMETHAMINE 30 MG/ML
INJECTION, SOLUTION INTRAMUSCULAR; INTRAVENOUS AS NEEDED
Status: DISCONTINUED | OUTPATIENT
Start: 2020-07-17 | End: 2020-07-17 | Stop reason: SURG

## 2020-07-17 RX ORDER — SODIUM CHLORIDE 0.9 % (FLUSH) 0.9 %
10 SYRINGE (ML) INJECTION AS NEEDED
Status: DISCONTINUED | OUTPATIENT
Start: 2020-07-17 | End: 2020-07-17 | Stop reason: HOSPADM

## 2020-07-17 RX ORDER — HYDROMORPHONE HYDROCHLORIDE 1 MG/ML
0.5 INJECTION, SOLUTION INTRAMUSCULAR; INTRAVENOUS; SUBCUTANEOUS
Status: DISCONTINUED | OUTPATIENT
Start: 2020-07-17 | End: 2020-07-17 | Stop reason: HOSPADM

## 2020-07-17 RX ORDER — SODIUM CHLORIDE, SODIUM LACTATE, POTASSIUM CHLORIDE, CALCIUM CHLORIDE 600; 310; 30; 20 MG/100ML; MG/100ML; MG/100ML; MG/100ML
9 INJECTION, SOLUTION INTRAVENOUS CONTINUOUS
Status: DISCONTINUED | OUTPATIENT
Start: 2020-07-17 | End: 2020-07-17 | Stop reason: HOSPADM

## 2020-07-17 RX ORDER — DEXAMETHASONE SODIUM PHOSPHATE 4 MG/ML
8 INJECTION, SOLUTION INTRA-ARTICULAR; INTRALESIONAL; INTRAMUSCULAR; INTRAVENOUS; SOFT TISSUE ONCE AS NEEDED
Status: COMPLETED | OUTPATIENT
Start: 2020-07-17 | End: 2020-07-17

## 2020-07-17 RX ORDER — CLINDAMYCIN PHOSPHATE 900 MG/50ML
900 INJECTION INTRAVENOUS ONCE
Status: COMPLETED | OUTPATIENT
Start: 2020-07-17 | End: 2020-07-17

## 2020-07-17 RX ORDER — FAMOTIDINE 10 MG/ML
20 INJECTION, SOLUTION INTRAVENOUS
Status: COMPLETED | OUTPATIENT
Start: 2020-07-17 | End: 2020-07-17

## 2020-07-17 RX ORDER — GLYCOPYRROLATE 0.2 MG/ML
INJECTION INTRAMUSCULAR; INTRAVENOUS AS NEEDED
Status: DISCONTINUED | OUTPATIENT
Start: 2020-07-17 | End: 2020-07-17 | Stop reason: SURG

## 2020-07-17 RX ORDER — LIDOCAINE HYDROCHLORIDE 10 MG/ML
0.5 INJECTION, SOLUTION EPIDURAL; INFILTRATION; INTRACAUDAL; PERINEURAL ONCE AS NEEDED
Status: DISCONTINUED | OUTPATIENT
Start: 2020-07-17 | End: 2020-07-17 | Stop reason: HOSPADM

## 2020-07-17 RX ORDER — PROPOFOL 10 MG/ML
VIAL (ML) INTRAVENOUS AS NEEDED
Status: DISCONTINUED | OUTPATIENT
Start: 2020-07-17 | End: 2020-07-17 | Stop reason: SURG

## 2020-07-17 RX ADMIN — DEXAMETHASONE SODIUM PHOSPHATE 8 MG: 4 INJECTION, SOLUTION INTRAMUSCULAR; INTRAVENOUS at 10:09

## 2020-07-17 RX ADMIN — CLINDAMYCIN IN 5 PERCENT DEXTROSE 900 MG: 18 INJECTION, SOLUTION INTRAVENOUS at 11:09

## 2020-07-17 RX ADMIN — FAMOTIDINE 20 MG: 10 INJECTION INTRAVENOUS at 10:10

## 2020-07-17 RX ADMIN — HYDROMORPHONE HYDROCHLORIDE 0.5 MG: 1 INJECTION, SOLUTION INTRAMUSCULAR; INTRAVENOUS; SUBCUTANEOUS at 12:27

## 2020-07-17 RX ADMIN — FENTANYL CITRATE 50 MCG: 50 INJECTION, SOLUTION INTRAMUSCULAR; INTRAVENOUS at 11:16

## 2020-07-17 RX ADMIN — LIDOCAINE HYDROCHLORIDE 100 MG: 20 INJECTION, SOLUTION INFILTRATION; PERINEURAL at 11:16

## 2020-07-17 RX ADMIN — KETOROLAC TROMETHAMINE 30 MG: 30 INJECTION INTRAMUSCULAR; INTRAVENOUS at 11:46

## 2020-07-17 RX ADMIN — NEOSTIGMINE METHYLSULFATE 4 MG: 1 INJECTION INTRAVENOUS at 11:45

## 2020-07-17 RX ADMIN — DEXMEDETOMIDINE HYDROCHLORIDE 10 MCG: 100 INJECTION, SOLUTION, CONCENTRATE INTRAVENOUS at 11:26

## 2020-07-17 RX ADMIN — SUCCINYLCHOLINE CHLORIDE 120 MG: 20 INJECTION, SOLUTION INTRAMUSCULAR; INTRAVENOUS at 11:16

## 2020-07-17 RX ADMIN — ONDANSETRON 4 MG: 2 INJECTION, SOLUTION INTRAMUSCULAR; INTRAVENOUS at 10:10

## 2020-07-17 RX ADMIN — ACETAMINOPHEN 1000 MG: 500 TABLET, FILM COATED ORAL at 10:10

## 2020-07-17 RX ADMIN — FENTANYL CITRATE 50 MCG: 50 INJECTION, SOLUTION INTRAMUSCULAR; INTRAVENOUS at 11:29

## 2020-07-17 RX ADMIN — GLYCOPYRROLATE 0.6 MG: 0.2 INJECTION INTRAMUSCULAR; INTRAVENOUS at 11:45

## 2020-07-17 RX ADMIN — DEXMEDETOMIDINE HYDROCHLORIDE 10 MCG: 100 INJECTION, SOLUTION, CONCENTRATE INTRAVENOUS at 11:10

## 2020-07-17 RX ADMIN — PROPOFOL 200 MG: 10 INJECTION, EMULSION INTRAVENOUS at 11:16

## 2020-07-17 RX ADMIN — SODIUM CHLORIDE, POTASSIUM CHLORIDE, SODIUM LACTATE AND CALCIUM CHLORIDE 9 ML/HR: 600; 310; 30; 20 INJECTION, SOLUTION INTRAVENOUS at 10:01

## 2020-07-17 RX ADMIN — ROCURONIUM BROMIDE 20 MG: 10 INJECTION, SOLUTION INTRAVENOUS at 11:18

## 2020-07-17 NOTE — ANESTHESIA PREPROCEDURE EVALUATION
Anesthesia Evaluation     Patient summary reviewed and Nursing notes reviewed   no history of anesthetic complications:  NPO Solid Status: > 8 hours  NPO Liquid Status: > 4 hours           Airway   Mallampati: II  Neck ROM: full  No difficulty expected  Dental      Pulmonary - negative pulmonary ROS and normal exam    breath sounds clear to auscultation  Cardiovascular - normal exam  Exercise tolerance: good (4-7 METS)    ECG reviewed  Rhythm: regular  Rate: normal        Neuro/Psych  (+) headaches, psychiatric history Anxiety and Depression,     GI/Hepatic/Renal/Endo    (+)  GERD poorly controlled,      Musculoskeletal (-) negative ROS    Abdominal  - normal exam   Substance History   (+) alcohol use (occasionally ),      OB/GYN          Other - negative ROS                       Anesthesia Plan    ASA 2     general     intravenous induction     Anesthetic plan, all risks, benefits, and alternatives have been provided, discussed and informed consent has been obtained with: patient.  Use of blood products discussed with patient  Consented to blood products.

## 2020-07-17 NOTE — OP NOTE
Preoperative diagnosis pilonidal cyst  Postoperative diagnosis same  Procedure excision pilonidal cyst  Complications none  Drains none  Specimens skin and subcutaneous tissue to pathology  Estimated blood loss 15 cc  Anesthesia General via endotracheal tube  Surgeon Dr. Martini  Findings 2 small sinus tracts  Procedure after satisfactory induction general anesthesia via endotracheal tube the patient was laid prone jackknife on the operating table and her buttocks were taped apart.  Her racquel cleft was prepped and draped in sterile fashion.  Elliptical skin incision was marked around the 2 sinus tracts in the prior I&D site.  The incision was oriented to the right side to try to get it partially off the midline.  Skin and subcutaneous tissue was divided.  Subcutaneous tissue was divided all the way down to presacral fascia and this specimen was amputated off at that point.  All chronic appearing granulation tissue was excised.  Hemostasis was assured with electrocautery.  Tapes were released.  Subcutaneous tissue was closed in multiple layers with use of 2-0 Vicryl simple suture.  Skin was closed with 2-0 nylon interrupted vertical mattress sutures.  Wound was clean and dry and sterilely dressed.  The patient tolerated the procedure well was transferred to the recovery room in stable condition.  When she is awake alert stable tolerating p.o. and has voided she will be discharged home to follow-up in my office and 10 to 12 days to call for problems.  Diet as tolerated.  No lifting more than 5 pounds.  She in the a.m. she needs to start twice daily showers and change her dressing as needed.  A prescription for Percocet 5/325 1-2 p.o. every 4 hours as needed pain 30 these were dispensed without refills was sent to her pharmacy.  She should call for problems and call for an appointment.

## 2020-07-17 NOTE — ANESTHESIA PROCEDURE NOTES
Airway  Urgency: elective    Date/Time: 7/17/2020 11:17 AM  Airway not difficult    General Information and Staff    Patient location during procedure: OR  CRNA: Ludivina Batista CRNA    Indications and Patient Condition  Indications for airway management: airway protection    Preoxygenated: yes  MILS maintained throughout  Mask difficulty assessment: 0 - not attempted    Final Airway Details  Final airway type: endotracheal airway      Successful airway: ETT  Cuffed: yes   Successful intubation technique: direct laryngoscopy  Facilitating devices/methods: intubating stylet  Endotracheal tube insertion site: oral  Blade: Namita  Blade size: 3  ETT size (mm): 7.5  Cormack-Lehane Classification: grade I - full view of glottis  Placement verified by: chest auscultation and capnometry   Cuff volume (mL): 8  Measured from: lips  ETT/EBT  to lips (cm): 22  Number of attempts at approach: 1  Assessment: lips, teeth, and gum same as pre-op and atraumatic intubation

## 2020-07-17 NOTE — INTERVAL H&P NOTE
"  H&P updated. The patient was examined and the following changes are noted:  vs  /63   Temp 98.4 °F (36.9 °C) (Oral)   Resp 18   Ht 162.6 cm (64.02\")   Wt 87.1 kg (192 lb)   LMP 06/24/2020 (Exact Date)   SpO2 97%   BMI 32.94 kg/m²         "

## 2020-07-17 NOTE — ANESTHESIA POSTPROCEDURE EVALUATION
Patient: Cydney Soto    Procedure Summary     Date:  07/17/20 Room / Location:   [facility] 1 /  [facility]    Anesthesia Start:  1106 Anesthesia Stop:  1157    Procedure:  PILONIDAL CYSTECTOMY (N/A Back) Diagnosis:       Pilonidal cyst      (Pilonidal cyst [L05.91])    Surgeon:  Bk Martini MD Provider:  Ludivina Batista CRNA    Anesthesia Type:  general ASA Status:  2          Anesthesia Type: general    Vitals  Vitals Value Taken Time   /63 7/17/2020 12:40 PM   Temp 99.1 °F (37.3 °C) 7/17/2020 11:58 AM   Pulse 102 7/17/2020 12:43 PM   Resp 17 7/17/2020 12:40 PM   SpO2 98 % 7/17/2020 12:44 PM   Vitals shown include unvalidated device data.        Post Anesthesia Care and Evaluation    Patient location during evaluation: bedside  Patient participation: complete - patient participated  Level of consciousness: awake  Pain score: 0  Pain management: adequate  Airway patency: patent  Anesthetic complications: No anesthetic complications  PONV Status: none  Cardiovascular status: acceptable  Respiratory status: acceptable  Hydration status: acceptable

## 2020-07-20 ENCOUNTER — TELEPHONE (OUTPATIENT)
Dept: SURGERY | Facility: CLINIC | Age: 30
End: 2020-07-20

## 2020-07-20 DIAGNOSIS — G89.18 POSTOPERATIVE PAIN: Primary | ICD-10-CM

## 2020-07-20 LAB
CYTO UR: NORMAL
LAB AP CASE REPORT: NORMAL
PATH REPORT.FINAL DX SPEC: NORMAL
PATH REPORT.GROSS SPEC: NORMAL

## 2020-07-20 RX ORDER — OXYCODONE AND ACETAMINOPHEN 10; 325 MG/1; MG/1
1 TABLET ORAL EVERY 4 HOURS PRN
Qty: 20 TABLET | Refills: 0 | Status: SHIPPED | OUTPATIENT
Start: 2020-07-20 | End: 2020-07-27

## 2020-07-20 NOTE — TELEPHONE ENCOUNTER
Patient called and states she has had some significant pain over the weekend and only has a few pain pill left and wanted to know if she could have another script sent to her pharmacy

## 2020-07-27 ENCOUNTER — OFFICE VISIT (OUTPATIENT)
Dept: SURGERY | Facility: CLINIC | Age: 30
End: 2020-07-27

## 2020-07-27 DIAGNOSIS — Z09 SURGICAL FOLLOW-UP CARE: Primary | ICD-10-CM

## 2020-07-27 PROCEDURE — 99024 POSTOP FOLLOW-UP VISIT: CPT | Performed by: SURGERY

## 2020-07-27 NOTE — PROGRESS NOTES
1 1/2 wk PO pilonidal lesion - no problems at this time  She is without complaints.  Her incision is healing well.  Her pathology was discussed.  One half of her sutures were removed.  We will see her back in 2 weeks

## 2020-08-10 ENCOUNTER — OFFICE VISIT (OUTPATIENT)
Dept: SURGERY | Facility: CLINIC | Age: 30
End: 2020-08-10

## 2020-08-10 DIAGNOSIS — Z09 SURGICAL FOLLOW-UP CARE: Primary | ICD-10-CM

## 2020-08-10 PROCEDURE — 99024 POSTOP FOLLOW-UP VISIT: CPT | Performed by: SURGERY

## 2020-08-10 NOTE — PROGRESS NOTES
3 1/2 weeks s/p excision pilonidal cyst. Some drainage at incision.   Has had some mild serous drainage.  There is no cellulitis.  There is 1 mm open area inferiorly.  This is superficial.  Remaining sutures were removed.  She may return to work.  I would like to see her back in 2 weeks

## 2020-08-31 ENCOUNTER — OFFICE VISIT (OUTPATIENT)
Dept: SURGERY | Facility: CLINIC | Age: 30
End: 2020-08-31

## 2020-08-31 VITALS
SYSTOLIC BLOOD PRESSURE: 130 MMHG | DIASTOLIC BLOOD PRESSURE: 72 MMHG | HEIGHT: 64 IN | WEIGHT: 192 LBS | RESPIRATION RATE: 18 BRPM | BODY MASS INDEX: 32.78 KG/M2

## 2020-08-31 DIAGNOSIS — Z09 SURGICAL FOLLOW-UP CARE: Primary | ICD-10-CM

## 2020-08-31 PROCEDURE — 99024 POSTOP FOLLOW-UP VISIT: CPT | Performed by: SURGERY

## 2020-08-31 NOTE — PROGRESS NOTES
Subjective   Cydney Soto is a 29 y.o. female here for   Chief Complaint   Patient presents with   • Follow-up     6 1/2 wk S/P exc pilonidal cyst - incision has opened and is draining    History of Present Illness she said the wound opened on Saturday and has had some serous type drainage since.  She denies any fevers or chills.    The following portions of the patient's history were reviewed and updated as appropriate: allergies, current medications, past family history, past medical history, past social history, past surgical history and problem list.    Past Medical History:   Diagnosis Date   • Anxiety    • Bilateral bunions    • Depression    • GERD (gastroesophageal reflux disease)    • Migraine    • Pilonidal cyst     SCHEDULED FOR SX       Past Surgical History:   Procedure Laterality Date   • HAND LACERATION REPAIR      [age] YEAR OLD   • PILONIDAL CYSTECTOMY N/A 2020    Procedure: PILONIDAL CYSTECTOMY;  Surgeon: Bk Martini MD;  Location: [facility];  Service: General;  Laterality: N/A;   • TONSILLECTOMY         Family History   Problem Relation Age of Onset   • Depression Mother    • Alcohol abuse Father    • Learning disabilities Sister    • Heart disease Maternal Grandfather        Social History     Socioeconomic History   • Marital status:      Spouse name: Not on file   • Number of children: Not on file   • Years of education: Not on file   • Highest education level: Not on file   Tobacco Use   • Smoking status: Former Smoker     Packs/day: 1.00     Years: 15.00     Pack years: 15.00     Types: Cigarettes     Last attempt to quit: 2020     Years since quittin.1   • Smokeless tobacco: Never Used   Substance and Sexual Activity   • Alcohol use: Yes     Comment: monthly 1-2 DRINKS MAYBE MONTHLY   • Drug use: No   • Sexual activity: Defer       Current Outpatient Medications on File Prior to Visit   Medication Sig Dispense Refill   • buPROPion XL (WELLBUTRIN XL) 150 MG  24 hr tablet Take 300 mg by mouth Every Morning.     • omeprazole (priLOSEC) 20 MG capsule Take 20 mg by mouth Every Morning.     • venlafaxine XR (EFFEXOR-XR) 75 MG 24 hr capsule Take 75 mg by mouth Every Morning.       No current facility-administered medications on file prior to visit.          Review of Systems      Objective   Physical Exam there is no cellulitis there is no abscess.  The wound has a small opening in the superior aspect of that tracks for about 8 mm.  It was cleaned with hydroperoxide and Q-tip today.              Assessment/Plan   Open wound  Clean with hydrogen peroxide Q-tip and shower twice daily I would like to see her back in the office next week

## 2020-09-08 ENCOUNTER — OFFICE VISIT (OUTPATIENT)
Dept: SURGERY | Facility: CLINIC | Age: 30
End: 2020-09-08

## 2020-09-08 VITALS
HEIGHT: 64 IN | DIASTOLIC BLOOD PRESSURE: 70 MMHG | WEIGHT: 192 LBS | RESPIRATION RATE: 18 BRPM | BODY MASS INDEX: 32.78 KG/M2 | SYSTOLIC BLOOD PRESSURE: 122 MMHG

## 2020-09-08 DIAGNOSIS — Z09 SURGICAL FOLLOW-UP CARE: Primary | ICD-10-CM

## 2020-09-08 PROCEDURE — 99024 POSTOP FOLLOW-UP VISIT: CPT | Performed by: SURGERY

## 2020-09-08 NOTE — PROGRESS NOTES
Subjective   Cydney Soto is a 29 y.o. female here for   Chief Complaint   Patient presents with   • Wound Check   wound check - states things are getting better    History of Present Illness she says she thinks the area is improved.  They are no longer able to get a Q-tip in the site and her drainage is now minimal    The following portions of the patient's history were reviewed and updated as appropriate: allergies, current medications, past family history, past medical history, past social history, past surgical history and problem list.    Past Medical History:   Diagnosis Date   • Anxiety    • Bilateral bunions    • Depression    • GERD (gastroesophageal reflux disease)    • Migraine    • Pilonidal cyst     SCHEDULED FOR SX       Past Surgical History:   Procedure Laterality Date   • HAND LACERATION REPAIR      [age] YEAR OLD   • PILONIDAL CYSTECTOMY N/A 2020    Procedure: PILONIDAL CYSTECTOMY;  Surgeon: Bk Martini MD;  Location: [facility];  Service: General;  Laterality: N/A;   • TONSILLECTOMY         Family History   Problem Relation Age of Onset   • Depression Mother    • Alcohol abuse Father    • Learning disabilities Sister    • Heart disease Maternal Grandfather        Social History     Socioeconomic History   • Marital status:      Spouse name: Not on file   • Number of children: Not on file   • Years of education: Not on file   • Highest education level: Not on file   Tobacco Use   • Smoking status: Former Smoker     Packs/day: 1.00     Years: 15.00     Pack years: 15.00     Types: Cigarettes     Last attempt to quit: 2020     Years since quittin.2   • Smokeless tobacco: Never Used   Substance and Sexual Activity   • Alcohol use: Yes     Comment: monthly 1-2 DRINKS MAYBE MONTHLY   • Drug use: No   • Sexual activity: Defer       Current Outpatient Medications on File Prior to Visit   Medication Sig Dispense Refill   • buPROPion XL (WELLBUTRIN XL) 150 MG 24 hr tablet  Take 300 mg by mouth Every Morning.     • omeprazole (priLOSEC) 20 MG capsule Take 20 mg by mouth Every Morning.     • venlafaxine XR (EFFEXOR-XR) 75 MG 24 hr capsule Take 75 mg by mouth Every Morning.       No current facility-administered medications on file prior to visit.          Review of Systems      Objective   Physical Exam the wound is still open but very superficial and there is no evidence of cellulitis.  Wound is smaller.              Assessment/Plan   Stop hydrogen peroxide to the site continue twice daily showers I will see her back in 3 weeks

## 2020-10-26 ENCOUNTER — OFFICE VISIT (OUTPATIENT)
Dept: OBSTETRICS AND GYNECOLOGY | Facility: CLINIC | Age: 30
End: 2020-10-26

## 2020-10-26 VITALS
WEIGHT: 186 LBS | DIASTOLIC BLOOD PRESSURE: 74 MMHG | HEIGHT: 64 IN | SYSTOLIC BLOOD PRESSURE: 120 MMHG | BODY MASS INDEX: 31.76 KG/M2

## 2020-10-26 DIAGNOSIS — Z31.69 ENCOUNTER FOR PRECONCEPTION CONSULTATION: ICD-10-CM

## 2020-10-26 DIAGNOSIS — Z31.9 INFERTILITY MANAGEMENT: ICD-10-CM

## 2020-10-26 DIAGNOSIS — Z13.9 SCREENING FOR CONDITION: ICD-10-CM

## 2020-10-26 DIAGNOSIS — L68.0 FEMALE HIRSUTISM: ICD-10-CM

## 2020-10-26 DIAGNOSIS — Z11.3 SCREEN FOR STD (SEXUALLY TRANSMITTED DISEASE): ICD-10-CM

## 2020-10-26 DIAGNOSIS — Z11.51 SPECIAL SCREENING EXAMINATION FOR HUMAN PAPILLOMAVIRUS (HPV): ICD-10-CM

## 2020-10-26 DIAGNOSIS — F17.200 SMOKER: ICD-10-CM

## 2020-10-26 DIAGNOSIS — Z01.419 PAP SMEAR, LOW-RISK: Primary | ICD-10-CM

## 2020-10-26 LAB
B-HCG UR QL: NEGATIVE
BILIRUB BLD-MCNC: NEGATIVE MG/DL
CLARITY, POC: CLEAR
COLOR UR: YELLOW
GLUCOSE UR STRIP-MCNC: NEGATIVE MG/DL
INTERNAL NEGATIVE CONTROL: NEGATIVE
INTERNAL POSITIVE CONTROL: POSITIVE
KETONES UR QL: NEGATIVE
LEUKOCYTE EST, POC: NEGATIVE
Lab: NORMAL
NITRITE UR-MCNC: NEGATIVE MG/ML
PH UR: 5 [PH] (ref 5–8)
PROT UR STRIP-MCNC: NEGATIVE MG/DL
RBC # UR STRIP: NEGATIVE /UL
SP GR UR: 1.03 (ref 1–1.03)
UROBILINOGEN UR QL: NORMAL

## 2020-10-26 PROCEDURE — 81002 URINALYSIS NONAUTO W/O SCOPE: CPT | Performed by: OBSTETRICS & GYNECOLOGY

## 2020-10-26 PROCEDURE — 99385 PREV VISIT NEW AGE 18-39: CPT | Performed by: OBSTETRICS & GYNECOLOGY

## 2020-10-26 PROCEDURE — 99213 OFFICE O/P EST LOW 20 MIN: CPT | Performed by: OBSTETRICS & GYNECOLOGY

## 2020-10-26 PROCEDURE — 81025 URINE PREGNANCY TEST: CPT | Performed by: OBSTETRICS & GYNECOLOGY

## 2020-10-27 ENCOUNTER — PROCEDURE VISIT (OUTPATIENT)
Dept: OBSTETRICS AND GYNECOLOGY | Facility: CLINIC | Age: 30
End: 2020-10-27

## 2020-10-27 ENCOUNTER — LAB (OUTPATIENT)
Dept: OBSTETRICS AND GYNECOLOGY | Facility: CLINIC | Age: 30
End: 2020-10-27

## 2020-10-27 DIAGNOSIS — Z31.9 INFERTILITY MANAGEMENT: Primary | ICD-10-CM

## 2020-10-27 DIAGNOSIS — E28.2 PCOS (POLYCYSTIC OVARIAN SYNDROME): Primary | ICD-10-CM

## 2020-10-27 DIAGNOSIS — Q51.9 UTERINE ANOMALY: ICD-10-CM

## 2020-10-27 LAB
HBV SURFACE AG SERPL QL IA: NEGATIVE
HCV AB S/CO SERPL IA: <0.1 S/CO RATIO (ref 0–0.9)
HIV 1+2 AB+HIV1 P24 AG SERPL QL IA: NON REACTIVE
HSV1 IGG SER IA-ACNC: 6.3 INDEX (ref 0–0.9)
HSV2 IGG SER IA-ACNC: <0.91 INDEX (ref 0–0.9)
RPR SER QL: NON REACTIVE
RUBV IGG SERPL IA-ACNC: 4.05 INDEX
VZV IGG SER IA-ACNC: 1162 INDEX

## 2020-10-27 PROCEDURE — 76830 TRANSVAGINAL US NON-OB: CPT | Performed by: OBSTETRICS & GYNECOLOGY

## 2020-10-29 LAB
C TRACH RRNA CVX QL NAA+PROBE: NEGATIVE
CONV .: NORMAL
CYTOLOGIST CVX/VAG CYTO: NORMAL
CYTOLOGY CVX/VAG DOC CYTO: NORMAL
CYTOLOGY CVX/VAG DOC THIN PREP: NORMAL
DX ICD CODE: NORMAL
HIV 1 & 2 AB SER-IMP: NORMAL
Lab: NORMAL
N GONORRHOEA RRNA CVX QL NAA+PROBE: NEGATIVE
OTHER STN SPEC: NORMAL
STAT OF ADQ CVX/VAG CYTO-IMP: NORMAL
T VAGINALIS RRNA SPEC QL NAA+PROBE: NEGATIVE

## 2020-10-29 NOTE — PROGRESS NOTES
PIP= pelvic ultrasound shows normal ovaries.  It does appear that she may have a partial septum or wall in her uterus.  We will discuss this further at her follow-up visit.  (Uterus 7.4 cm, EL 1 cm, partial septate uterus seen in 3D view, normal ovaries, no comparable data)

## 2020-11-01 LAB
17OHP SERPL-MCNC: 114 NG/DL
DHEA-S SERPL-MCNC: 253 UG/DL (ref 84.8–378)
ESTRADIOL SERPL-MCNC: 105 PG/ML
FSH SERPL-ACNC: 3.2 MIU/ML
GLUCOSE 2H P MEAL SERPL-MCNC: 92 MG/DL (ref 74–155)
GLUCOSE P FAST SERPL-MCNC: 87 MG/DL (ref 65–99)
HBA1C MFR BLD: 5.4 % (ref 4.8–5.6)
INSULIN SERPL-ACNC: 16.1 UIU/ML (ref 2.6–24.9)
PROLACTIN SERPL-MCNC: 8.6 NG/ML (ref 4.8–23.3)
TESTOST FREE SERPL-MCNC: 3.6 PG/ML (ref 0–4.2)
TSH SERPL DL<=0.005 MIU/L-ACNC: 1.59 UIU/ML (ref 0.27–4.2)

## 2021-06-17 DIAGNOSIS — Z31.9 INFERTILITY MANAGEMENT: ICD-10-CM

## 2021-06-17 DIAGNOSIS — N46.01 AZOOSPERMIA: Primary | ICD-10-CM

## 2022-05-25 ENCOUNTER — OFFICE VISIT (OUTPATIENT)
Dept: OBSTETRICS AND GYNECOLOGY | Facility: CLINIC | Age: 32
End: 2022-05-25

## 2022-05-25 VITALS
DIASTOLIC BLOOD PRESSURE: 86 MMHG | HEIGHT: 65 IN | WEIGHT: 177.6 LBS | SYSTOLIC BLOOD PRESSURE: 112 MMHG | BODY MASS INDEX: 29.59 KG/M2

## 2022-05-25 DIAGNOSIS — O09.299 HISTORY OF MISCARRIAGE, CURRENTLY PREGNANT: ICD-10-CM

## 2022-05-25 DIAGNOSIS — N92.6 MISSED MENSES: Primary | ICD-10-CM

## 2022-05-25 DIAGNOSIS — O20.0 THREATENED ABORTION: ICD-10-CM

## 2022-05-25 DIAGNOSIS — F17.210 CIGARETTE SMOKER: ICD-10-CM

## 2022-05-25 PROCEDURE — 99214 OFFICE O/P EST MOD 30 MIN: CPT | Performed by: OBSTETRICS & GYNECOLOGY

## 2022-05-25 RX ORDER — SUMATRIPTAN 100 MG/1
TABLET, FILM COATED ORAL
COMMUNITY
End: 2022-05-25

## 2022-05-25 RX ORDER — NICOTINE 21 MG/24HR
1 PATCH, TRANSDERMAL 24 HOURS TRANSDERMAL EVERY 24 HOURS
Qty: 30 EACH | Refills: 3 | Status: SHIPPED | OUTPATIENT
Start: 2022-05-25

## 2022-05-25 RX ORDER — VARENICLINE TARTRATE 1 MG/1
TABLET, FILM COATED ORAL
COMMUNITY
End: 2022-05-25

## 2022-05-25 NOTE — PROGRESS NOTES
OB CONFIRMATION APPOINTMENT    CC- Pt has had a menstrual irregularity    Chief Complaint   Patient presents with   • Amenorrhea     Lmp - 2022 , Pt took home + test , pt having bleeding today         HISTORY OF PRESENT ILLNESS:    HPI    Cydney Soto is being seen today to confirm she is pregnant.  Pt states she has been spotting intermittently and had a quant HCG drawn by Dr Baker recently.  That level was >5,000    Pt is not bleeding at this moment.     She is a 31 y.o.  Patient's last menstrual period was 2022 (exact date)..  EDC= 23.  Gestational age is 6wks     Current obstetric complaints : spotting     Prior obstetric issues, potential pregnancy concerns: prior miscarriage    Family history of genetic issues (includes FOB): no    OFFERED GENETIC TESTING: CfDNA, Cystic fibrosis, Spinal muscular atrophy, Fragile X syndrome: yes    Prior infections concerning in pregnancy (Rash, fever in last 2 weeks): none    HISTORY REVIEWED:      Past Medical History:   Diagnosis Date   • Anxiety    • Bilateral bunions    • Depression    • GERD (gastroesophageal reflux disease)    • Migraine    • Pilonidal cyst     SCHEDULED FOR SX       Past Surgical History:   Procedure Laterality Date   • HAND LACERATION REPAIR      [age] YEAR OLD   • PILONIDAL CYSTECTOMY N/A 2020    Procedure: PILONIDAL CYSTECTOMY;  Surgeon: Bk Martini MD;  Location: [facility];  Service: General;  Laterality: N/A;   • TONSILLECTOMY     • WISDOM TOOTH EXTRACTION           Current Outpatient Medications:   •  omeprazole (priLOSEC) 20 MG capsule, Take 20 mg by mouth Every Morning., Disp: , Rfl:   •  nicotine (Nicoderm CQ) 14 MG/24HR patch, Place 1 patch on the skin as directed by provider Daily., Disp: 30 each, Rfl: 3    Allergies   Allergen Reactions   • Penicillins Other (See Comments)     UNSURE OF REACTION   • Propranolol Anaphylaxis       Social History     Socioeconomic History   • Marital status:     Tobacco Use   • Smoking status: Current Every Day Smoker     Packs/day: 1.00     Years: 15.00     Pack years: 15.00     Types: Cigarettes   • Smokeless tobacco: Never Used   Substance and Sexual Activity   • Alcohol use: Yes     Comment: monthly 1-2 DRINKS MAYBE MONTHLY   • Drug use: No   • Sexual activity: Yes     Partners: Male     Birth control/protection: None       Family History   Problem Relation Age of Onset   • Depression Mother    • Alcohol abuse Father    • Learning disabilities Sister    • Heart disease Maternal Grandfather    • Breast cancer Neg Hx    • Ovarian cancer Neg Hx    • Colon cancer Neg Hx    • Deep vein thrombosis Neg Hx    • Pulmonary embolism Neg Hx    • Birth defects Neg Hx    • Mental retardation Neg Hx        REVIEW OF SYSTEMS:     Review of Systems   Constitutional: Negative.    HENT: Negative.    Eyes: Negative.    Respiratory: Negative.    Cardiovascular: Negative.    Gastrointestinal: Negative.    Endocrine: Negative.    Genitourinary: Positive for menstrual problem and vaginal bleeding.   Musculoskeletal: Negative.    Skin: Negative.    Allergic/Immunologic: Negative.    Neurological: Negative.    Hematological: Negative.    Psychiatric/Behavioral: Negative.        Physical Exam     Physical Exam  Vitals and nursing note reviewed.   Constitutional:       Appearance: She is well-developed.   HENT:      Head: Normocephalic and atraumatic.   Cardiovascular:      Rate and Rhythm: Normal rate.   Pulmonary:      Effort: Pulmonary effort is normal.   Abdominal:      General: There is no distension.      Palpations: Abdomen is soft. There is no mass.      Tenderness: There is no abdominal tenderness. There is no guarding.   Genitourinary:     Vagina: No vaginal discharge.   Musculoskeletal:         General: No tenderness or deformity. Normal range of motion.      Cervical back: Normal range of motion.   Skin:     General: Skin is warm and dry.      Coloration: Skin is not pale.       "Findings: No erythema or rash.   Neurological:      Mental Status: She is alert and oriented to person, place, and time.   Psychiatric:         Behavior: Behavior normal.         Thought Content: Thought content normal.         Judgment: Judgment normal.             Objective    /86   Ht 165.1 cm (65\")   Wt 80.6 kg (177 lb 9.6 oz)   LMP 2022 (Exact Date)   Breastfeeding No   BMI 29.55 kg/m²     Cydney Soto  reports that she has been smoking cigarettes. She has a 15.00 pack-year smoking history. She has never used smokeless tobacco.. I have educated her on the risk of diseases from using tobacco products such as cancer, COPD and heart disease.     I advised her to quit.  I will erx her nicotine patches.    PT COUNSELED TO QUIT SMOKING IN PREGNANCY.  (Cigarette smoking is associated with increased incidence of IUGR, PROM, PTL, PTD, SIDS, asthma, and ear infections.  . Smoking cessation is the single most important thing she can do to improve the pregnancy outcome.)                  Assessment    1) Pregnancy at Unknown   Diagnoses and all orders for this visit:    1. Missed menses (Primary)  -     POC Urinalysis Dipstick  -     POC Pregnancy, Urine  -     HCG, B-subunit, Quantitative    2. Threatened   -     HCG, B-subunit, Quantitative    3. Cigarette smoker    4. History of miscarriage, currently pregnant    Other orders  -     nicotine (Nicoderm CQ) 14 MG/24HR patch; Place 1 patch on the skin as directed by provider Daily.  Dispense: 30 each; Refill: 3         Plan    Patient is on Prenatal vitamins  Problem list reviewed and updated.  Reviewed routine prenatal care with the patient  Zika (travel restrictions/ok to use insect repellant), not to changing cat litter, food restrictions, avoidance of alcohol, tobacco and drugs and saunas/hot tubs.   All questions answered.     Pelvic rest.    Stop smoking.     Counseling was given to patient and family for the following topics: " diagnostic results, instructions for management, risk factor reductions, prognosis, patient and family education, impressions, risks and benefits of treatment options and importance of treatment compliance . I spent 30+ minutes caring for Cydney on this date of service. This time includes time spent by me in the following activities: preparing for the visit, reviewing tests, obtaining and/or reviewing a separately obtained history, performing a medically appropriate examination and/or evaluation, counseling and educating the patient/family/caregiver, ordering medications, tests, or procedures, referring and communicating with other health care professionals, documenting information in the medical record, independently interpreting results and communicating that information with the patient/family/caregiver and care coordination      RTO Return in about 2 weeks (around 6/8/2022) for ob phys w/ pap.    Delmar Kim MD    5/25/2022  15:12 EDT

## 2022-05-26 LAB — HCG INTACT+B SERPL-ACNC: NORMAL MIU/ML

## 2022-06-13 ENCOUNTER — APPOINTMENT (OUTPATIENT)
Dept: ULTRASOUND IMAGING | Facility: HOSPITAL | Age: 32
End: 2022-06-13

## 2022-06-13 ENCOUNTER — HOSPITAL ENCOUNTER (EMERGENCY)
Facility: HOSPITAL | Age: 32
Discharge: HOME | End: 2022-06-13
Attending: EMERGENCY MEDICINE | Admitting: EMERGENCY MEDICINE

## 2022-06-13 VITALS
BODY MASS INDEX: 29.41 KG/M2 | HEIGHT: 64 IN | WEIGHT: 172.3 LBS | DIASTOLIC BLOOD PRESSURE: 47 MMHG | RESPIRATION RATE: 18 BRPM | SYSTOLIC BLOOD PRESSURE: 115 MMHG | OXYGEN SATURATION: 99 % | TEMPERATURE: 98.4 F | HEART RATE: 67 BPM

## 2022-06-13 DIAGNOSIS — O20.0 THREATENED ABORTION: Primary | ICD-10-CM

## 2022-06-13 LAB
ABO GROUP BLD: NORMAL
ANION GAP SERPL CALCULATED.3IONS-SCNC: 11 MMOL/L (ref 5–15)
BACTERIA UR QL AUTO: ABNORMAL /HPF
BASOPHILS # BLD AUTO: 0.03 10*3/MM3 (ref 0–0.2)
BASOPHILS NFR BLD AUTO: 0.3 % (ref 0–1.5)
BILIRUB UR QL STRIP: NEGATIVE
BUN SERPL-MCNC: 9 MG/DL (ref 6–20)
BUN/CREAT SERPL: 14.3 (ref 7–25)
CALCIUM SPEC-SCNC: 9.2 MG/DL (ref 8.6–10.5)
CHLORIDE SERPL-SCNC: 102 MMOL/L (ref 98–107)
CLARITY UR: CLEAR
CO2 SERPL-SCNC: 22 MMOL/L (ref 22–29)
COLOR UR: ABNORMAL
CREAT SERPL-MCNC: 0.63 MG/DL (ref 0.57–1)
DEPRECATED RDW RBC AUTO: 52 FL (ref 37–54)
EGFRCR SERPLBLD CKD-EPI 2021: 123.3 ML/MIN/1.73
EOSINOPHIL # BLD AUTO: 0.12 10*3/MM3 (ref 0–0.4)
EOSINOPHIL NFR BLD AUTO: 1.2 % (ref 0.3–6.2)
ERYTHROCYTE [DISTWIDTH] IN BLOOD BY AUTOMATED COUNT: 18.9 % (ref 12.3–15.4)
GLUCOSE SERPL-MCNC: 102 MG/DL (ref 65–99)
GLUCOSE UR STRIP-MCNC: NEGATIVE MG/DL
HCG INTACT+B SERPL-ACNC: NORMAL MIU/ML
HCT VFR BLD AUTO: 30.4 % (ref 34–46.6)
HGB BLD-MCNC: 8.9 G/DL (ref 12–15.9)
HGB UR QL STRIP.AUTO: NEGATIVE
HYALINE CASTS UR QL AUTO: ABNORMAL /LPF
IMM GRANULOCYTES # BLD AUTO: 0.02 10*3/MM3 (ref 0–0.05)
IMM GRANULOCYTES NFR BLD AUTO: 0.2 % (ref 0–0.5)
KETONES UR QL STRIP: NEGATIVE
KOH PREP NAIL: NORMAL
LEUKOCYTE ESTERASE UR QL STRIP.AUTO: ABNORMAL
LYMPHOCYTES # BLD AUTO: 2.08 10*3/MM3 (ref 0.7–3.1)
LYMPHOCYTES NFR BLD AUTO: 21.2 % (ref 19.6–45.3)
MCH RBC QN AUTO: 22.1 PG (ref 26.6–33)
MCHC RBC AUTO-ENTMCNC: 29.3 G/DL (ref 31.5–35.7)
MCV RBC AUTO: 75.4 FL (ref 79–97)
MONOCYTES # BLD AUTO: 0.69 10*3/MM3 (ref 0.1–0.9)
MONOCYTES NFR BLD AUTO: 7 % (ref 5–12)
NEUTROPHILS NFR BLD AUTO: 6.88 10*3/MM3 (ref 1.7–7)
NEUTROPHILS NFR BLD AUTO: 70.1 % (ref 42.7–76)
NITRITE UR QL STRIP: NEGATIVE
NRBC BLD AUTO-RTO: 0 /100 WBC (ref 0–0.2)
PH UR STRIP.AUTO: 6 [PH] (ref 4.5–8)
PLATELET # BLD AUTO: 324 10*3/MM3 (ref 140–450)
PMV BLD AUTO: 9.9 FL (ref 6–12)
POTASSIUM SERPL-SCNC: 4.2 MMOL/L (ref 3.5–5.2)
PROT UR QL STRIP: NEGATIVE
RBC # BLD AUTO: 4.03 10*6/MM3 (ref 3.77–5.28)
RBC # UR STRIP: ABNORMAL /HPF
REF LAB TEST METHOD: ABNORMAL
RH BLD: POSITIVE
SODIUM SERPL-SCNC: 135 MMOL/L (ref 136–145)
SP GR UR STRIP: <=1.005 (ref 1–1.03)
SQUAMOUS #/AREA URNS HPF: ABNORMAL /HPF
UROBILINOGEN UR QL STRIP: ABNORMAL
WBC # UR STRIP: ABNORMAL /HPF
WBC NRBC COR # BLD: 9.82 10*3/MM3 (ref 3.4–10.8)

## 2022-06-13 PROCEDURE — 87220 TISSUE EXAM FOR FUNGI: CPT

## 2022-06-13 PROCEDURE — 36415 COLL VENOUS BLD VENIPUNCTURE: CPT

## 2022-06-13 PROCEDURE — 80048 BASIC METABOLIC PNL TOTAL CA: CPT

## 2022-06-13 PROCEDURE — 81001 URINALYSIS AUTO W/SCOPE: CPT

## 2022-06-13 PROCEDURE — 99283 EMERGENCY DEPT VISIT LOW MDM: CPT

## 2022-06-13 PROCEDURE — 76805 OB US >/= 14 WKS SNGL FETUS: CPT

## 2022-06-13 PROCEDURE — 85025 COMPLETE CBC W/AUTO DIFF WBC: CPT

## 2022-06-13 PROCEDURE — 99282 EMERGENCY DEPT VISIT SF MDM: CPT

## 2022-06-13 PROCEDURE — 84702 CHORIONIC GONADOTROPIN TEST: CPT

## 2022-06-13 PROCEDURE — 86901 BLOOD TYPING SEROLOGIC RH(D): CPT

## 2022-06-13 PROCEDURE — 86900 BLOOD TYPING SEROLOGIC ABO: CPT

## 2022-06-13 NOTE — ED PROVIDER NOTES
EMERGENCY DEPARTMENT ENCOUNTER      Room Number:     History is provided by the patient, no translation services needed    HPI:    Chief complaint: Vaginal bleeding in pregnancy    Location: Uterine    Quality/Severity: Brown and pink spotting    Timing/Duration: last night and today    Modifying Factors: Approximately 9 weeks pregnant    Associated Symptoms: Vaginal discharge.    Narrative: Pt is a 31 y.o. female who is 9 weeks pregnant presents complaining of vaginal spotting that began last night and continued today.  Describes brown and pink spotting.  1 episode of larger bleeding or clot.  Also reports a white discharge history of previous miscarriage x8 years ago.  Denies abdominal pain, nausea, vomiting, lightheadedness.  Next OB/GYN appointment is in 2 days.      PMD: Jesús Baker MD    REVIEW OF SYSTEMS  Review of Systems   Constitutional: Negative for chills and fever.   Respiratory: Negative for cough and shortness of breath.    Cardiovascular: Negative for chest pain.   Gastrointestinal: Negative for abdominal pain, constipation, diarrhea, nausea and vomiting.   Genitourinary: Positive for vaginal bleeding and vaginal discharge. Negative for dysuria.   Musculoskeletal: Negative for arthralgias and myalgias.   Skin: Negative for rash and wound.   Neurological: Negative for dizziness, syncope and headaches.   Psychiatric/Behavioral: Negative for suicidal ideas. The patient is not nervous/anxious.          PAST MEDICAL HISTORY  Active Ambulatory Problems     Diagnosis Date Noted   • Pilonidal cyst 2020   • Threatened  2022   • Cigarette smoker 2022   • History of miscarriage, currently pregnant 2022     Resolved Ambulatory Problems     Diagnosis Date Noted   • No Resolved Ambulatory Problems     Past Medical History:   Diagnosis Date   • Anxiety    • Bilateral bunions    • Depression    • GERD (gastroesophageal reflux disease)    • Migraine        PAST SURGICAL  HISTORY  Past Surgical History:   Procedure Laterality Date   • HAND LACERATION REPAIR      [age] YEAR OLD   • PILONIDAL CYSTECTOMY N/A 7/17/2020    Procedure: PILONIDAL CYSTECTOMY;  Surgeon: Bk Martini MD;  Location: [facility] OR;  Service: General;  Laterality: N/A;   • TONSILLECTOMY     • WISDOM TOOTH EXTRACTION         FAMILY HISTORY  Family History   Problem Relation Age of Onset   • Depression Mother    • Alcohol abuse Father    • Learning disabilities Sister    • Heart disease Maternal Grandfather    • Breast cancer Neg Hx    • Ovarian cancer Neg Hx    • Colon cancer Neg Hx    • Deep vein thrombosis Neg Hx    • Pulmonary embolism Neg Hx    • Birth defects Neg Hx    • Mental retardation Neg Hx        SOCIAL HISTORY  Social History     Socioeconomic History   • Marital status:    Tobacco Use   • Smoking status: Current Every Day Smoker     Packs/day: 1.00     Years: 15.00     Pack years: 15.00     Types: Cigarettes   • Smokeless tobacco: Never Used   Substance and Sexual Activity   • Alcohol use: Yes     Comment: monthly 1-2 DRINKS MAYBE MONTHLY   • Drug use: No   • Sexual activity: Yes     Partners: Male     Birth control/protection: None       ALLERGIES  Penicillins and Propranolol    No current facility-administered medications for this encounter.    Current Outpatient Medications:   •  nicotine (Nicoderm CQ) 14 MG/24HR patch, Place 1 patch on the skin as directed by provider Daily., Disp: 30 each, Rfl: 3  •  omeprazole (priLOSEC) 20 MG capsule, Take 20 mg by mouth Every Morning., Disp: , Rfl:     PHYSICAL EXAM  ED Triage Vitals [06/13/22 1726]   Temp Heart Rate Resp BP SpO2   98.4 °F (36.9 °C) 83 20 120/63 97 %      Temp src Heart Rate Source Patient Position BP Location FiO2 (%)   Oral Monitor Lying Right arm --       Physical Exam  Constitutional:       General: She is not in acute distress.     Appearance: Normal appearance. She is normal weight. She is not ill-appearing.   HENT:      Head:  Normocephalic and atraumatic.   Cardiovascular:      Rate and Rhythm: Normal rate and regular rhythm.   Pulmonary:      Effort: Pulmonary effort is normal.      Breath sounds: Normal breath sounds.   Abdominal:      Palpations: Abdomen is soft.      Tenderness: There is no abdominal tenderness. There is no guarding.   Genitourinary:     Comments: Thick creamy white discharge coming from cervical os.  Unclear as to whether cervical os is open.  No evidence of dipika blood or clotting.  Skin:     General: Skin is warm and dry.      Capillary Refill: Capillary refill takes less than 2 seconds.   Neurological:      General: No focal deficit present.      Mental Status: She is alert and oriented to person, place, and time.   Psychiatric:         Mood and Affect: Mood normal.         Behavior: Behavior normal.           LAB RESULTS  Lab Results (last 24 hours)     Procedure Component Value Units Date/Time    CBC & Differential [[id number]]  (Abnormal) Collected: 06/13/22 1759    Specimen: Blood Updated: 06/13/22 1819    Narrative:      The following orders were created for panel order CBC & Differential.  Procedure                               Abnormality         Status                     ---------                               -----------         ------                     CBC Auto Differential[[id number]]        Abnormal            Final result                 Please view results for these tests on the individual orders.    Basic Metabolic Panel [[id number]]  (Abnormal) Collected: 06/13/22 1759    Specimen: Blood Updated: 06/13/22 1839     Glucose 102 mg/dL      BUN 9 mg/dL      Creatinine 0.63 mg/dL      Sodium 135 mmol/L      Potassium 4.2 mmol/L      Chloride 102 mmol/L      CO2 22.0 mmol/L      Calcium 9.2 mg/dL      BUN/Creatinine Ratio 14.3     Anion Gap 11.0 mmol/L      eGFR 123.3 mL/min/1.73      Comment: National Kidney Foundation and American Society of Nephrology (ASN) Task Force recommended calculation based  on the Chronic Kidney Disease Epidemiology Collaboration (CKD-EPI) equation refit without adjustment for race.       Narrative:      GFR Normal >60  Chronic Kidney Disease <60  Kidney Failure <15      hCG, Quantitative, Pregnancy [[id number]] Collected: 06/13/22 1759    Specimen: Blood Updated: 06/13/22 1852     HCG Quantitative 78,659.00 mIU/mL     Narrative:      HCG Ranges by Gestational Age    Females - non-pregnant premenopausal   </= 1mIU/mL HCG  Females - postmenopausal               </= 7mIU/mL HCG    3 Weeks       5.4   -      72 mIU/mL  4 Weeks      10.2   -     708 mIU/mL  5 Weeks       217   -   8,245 mIU/mL  6 Weeks       152   -  32,177 mIU/mL  7 Weeks     4,059   - 153,767 mIU/mL  8 Weeks    31,366   - 149,094 mIU/mL  9 Weeks    59,109   - 135,901 mIU/mL  10 Weeks   44,186   - 170,409 mIU/mL  12 Weeks   27,107   - 201,615 mIU/mL  14 Weeks   24,302   -  93,646 mIU/mL  15 Weeks   12,540   -  69,747 mIU/mL  16 Weeks    8,904   -  55,332 mIU/mL  17 Weeks    8,240   -  51,793 mIU/mL  18 Weeks    9,649   -  55,271 mIU/mL      CBC Auto Differential [[id number]]  (Abnormal) Collected: 06/13/22 1759    Specimen: Blood Updated: 06/13/22 1819     WBC 9.82 10*3/mm3      RBC 4.03 10*6/mm3      Hemoglobin 8.9 g/dL      Hematocrit 30.4 %      MCV 75.4 fL      MCH 22.1 pg      MCHC 29.3 g/dL      RDW 18.9 %      RDW-SD 52.0 fl      MPV 9.9 fL      Platelets 324 10*3/mm3      Neutrophil % 70.1 %      Lymphocyte % 21.2 %      Monocyte % 7.0 %      Eosinophil % 1.2 %      Basophil % 0.3 %      Immature Grans % 0.2 %      Neutrophils, Absolute 6.88 10*3/mm3      Lymphocytes, Absolute 2.08 10*3/mm3      Monocytes, Absolute 0.69 10*3/mm3      Eosinophils, Absolute 0.12 10*3/mm3      Basophils, Absolute 0.03 10*3/mm3      Immature Grans, Absolute 0.02 10*3/mm3      nRBC 0.0 /100 WBC     Urinalysis With Microscopic If Indicated (No Culture) - Urine, Clean Catch [[id number]]  (Abnormal) Collected: 06/13/22 5281    Specimen:  Urine, Clean Catch Updated: 06/13/22 1909     Color, UA Straw     Appearance, UA Clear     pH, UA 6.0     Specific Gravity, UA <=1.005     Glucose, UA Negative     Ketones, UA Negative     Bilirubin, UA Negative     Blood, UA Negative     Protein, UA Negative     Leuk Esterase, UA Small (1+)     Nitrite, UA Negative     Urobilinogen, UA 0.2 E.U./dL    YANELY Prep - Swab, Vagina [[id number]] Collected: 06/13/22 1856    Specimen: Swab from Vagina Updated: 06/13/22 1911     KOH Prep No yeast or hyphal elements seen    Urinalysis, Microscopic Only - Urine, Clean Catch [[id number]]  (Abnormal) Collected: 06/13/22 1856    Specimen: Urine, Clean Catch Updated: 06/13/22 1921     RBC, UA None Seen /HPF      WBC, UA 3-5 /HPF      Bacteria, UA Trace /HPF      Squamous Epithelial Cells, UA 0-2 /HPF      Hyaline Casts, UA None Seen /LPF      Methodology Manual Light Microscopy            I ordered the above labs and reviewed the results    RADIOLOGY  US Ob 14 + Weeks Single or First Gestation    Result Date: 6/13/2022  US OB 1st Tri Sgl 1st Gest HISTORY: 9 weeks pregnant with bleeding TECHNIQUE: Transvaginal ultrasound of the pelvis. COMPARISON: 5/25/2022 FINDINGS: The current study shows a single live intrauterine gestation. Using the crown-rump length the estimated gestational age is 8 weeks and 5 days. Fetal heart rate is 183 bpm. There is a small posterior hemorrhage. The left and right ovaries are imaged and appear unremarkable.     Single living intrauterine embryo with an estimated gestational age of 8 weeks and 5 days. Signer Name: Pete Perez MD  Signed: 6/13/2022 6:55 PM  Workstation Name: [id number]  [facility]      I ordered the above radiologic testing and reviewed the results    PROCEDURES  Procedures      PROGRESS AND CONSULTS  ED Course as of 06/13/22 7027   Mon Jun 13, 2022   0169 Appointment with OB/GYN in 2 days for further management.  [AS]   2691 Discussed findings with ER attending  after patient discharge.  Patient needs surveillance and follow-up, when she is already scheduled for very soon, so there is no further management or intervention we need to investigate at this time. [AS]      ED Course User Index  [AS] Brianda Bruce PA-C           MEDICAL DECISION MAKING    MDM  Number of Diagnoses or Management Options  Threatened   Diagnosis management comments: My differential diagnosis includes was not limited to: Threatened , missed , incomplete , complete        Amount and/or Complexity of Data Reviewed  Clinical lab tests: reviewed  Tests in the radiology section of CPT®: reviewed  Decide to obtain previous medical records or to obtain history from someone other than the patient: yes    Risk of Complications, Morbidity, and/or Mortality  Presenting problems: low  Diagnostic procedures: low  Management options: low    Patient Progress  Patient progress: stable         DIAGNOSIS  Final diagnoses:   Threatened        Latest Documented Vital Signs:  As of 23:17 EDT  BP- 115/47 HR- 67 Temp- 98.4 °F (36.9 °C) (Oral) O2 sat- 99%    DISPOSITION  Discharged home.    Discussed pertinent findings with the patient/family.  Patient/Family voiced understanding of need to follow-up for recheck and further testing as needed.  Return to the Emergency Department warnings were given.         Medication List      No changes were made to your prescriptions during this visit.              Follow-up Information     Schedule an appointment as soon as possible for a visit  with [facility].    Contact information:  [address]  [facility] [address] [phone]                         Dictated utilizing Dragon dictation     Brianda Bruce PA-C  22 3022

## 2022-06-13 NOTE — DISCHARGE INSTRUCTIONS
Follow-up with your OB/GYN.  Return to the emergency department for worsening symptoms or other medical emergencies.

## 2022-06-15 ENCOUNTER — INITIAL PRENATAL (OUTPATIENT)
Dept: OBSTETRICS AND GYNECOLOGY | Facility: CLINIC | Age: 32
End: 2022-06-15

## 2022-06-15 VITALS — SYSTOLIC BLOOD PRESSURE: 108 MMHG | DIASTOLIC BLOOD PRESSURE: 76 MMHG | BODY MASS INDEX: 29.83 KG/M2 | WEIGHT: 173.8 LBS

## 2022-06-15 DIAGNOSIS — O41.8X10 SUBCHORIONIC HEMORRHAGE OF PLACENTA IN FIRST TRIMESTER, SINGLE OR UNSPECIFIED FETUS: ICD-10-CM

## 2022-06-15 DIAGNOSIS — Z34.91 INITIAL OBSTETRIC VISIT IN FIRST TRIMESTER: Primary | ICD-10-CM

## 2022-06-15 DIAGNOSIS — Z11.51 ENCOUNTER FOR SCREENING FOR HUMAN PAPILLOMAVIRUS (HPV): ICD-10-CM

## 2022-06-15 DIAGNOSIS — O99.019 MATERNAL ANEMIA IN PREGNANCY, ANTEPARTUM: ICD-10-CM

## 2022-06-15 DIAGNOSIS — Z36.9 ENCOUNTER FOR ANTENATAL SCREENING, UNSPECIFIED: ICD-10-CM

## 2022-06-15 DIAGNOSIS — O46.8X1 SUBCHORIONIC HEMORRHAGE OF PLACENTA IN FIRST TRIMESTER, SINGLE OR UNSPECIFIED FETUS: ICD-10-CM

## 2022-06-15 DIAGNOSIS — Z01.419 PAP SMEAR, LOW-RISK: ICD-10-CM

## 2022-06-15 LAB
GLUCOSE UR STRIP-MCNC: NEGATIVE MG/DL
PROT UR STRIP-MCNC: NEGATIVE MG/DL

## 2022-06-15 PROCEDURE — 99214 OFFICE O/P EST MOD 30 MIN: CPT | Performed by: NURSE PRACTITIONER
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
## 2022-06-16 LAB
ABO GROUP BLD: ABNORMAL
BASOPHILS # BLD AUTO: 0 X10E3/UL (ref 0–0.2)
BASOPHILS NFR BLD AUTO: 0 %
BLD GP AB SCN SERPL QL: NEGATIVE
EOSINOPHIL # BLD AUTO: 0.1 X10E3/UL (ref 0–0.4)
EOSINOPHIL NFR BLD AUTO: 1 %
ERYTHROCYTE [DISTWIDTH] IN BLOOD BY AUTOMATED COUNT: 18.2 % (ref 11.7–15.4)
FERRITIN SERPL-MCNC: 8 NG/ML (ref 15–150)
HBA1C MFR BLD: 5.7 % (ref 4.8–5.6)
HBV SURFACE AG SERPL QL IA: NEGATIVE
HCT VFR BLD AUTO: 29.4 % (ref 34–46.6)
HCV AB S/CO SERPL IA: <0.1 S/CO RATIO (ref 0–0.9)
HGB A MFR BLD ELPH: 97.9 % (ref 96.4–98.8)
HGB A2 MFR BLD ELPH: 2.1 % (ref 1.8–3.2)
HGB BLD-MCNC: 8.6 G/DL (ref 11.1–15.9)
HGB F MFR BLD ELPH: 0 % (ref 0–2)
HGB FRACT BLD-IMP: NORMAL
HGB S MFR BLD ELPH: 0 %
HIV 1+2 AB+HIV1 P24 AG SERPL QL IA: NON REACTIVE
IMM GRANULOCYTES # BLD AUTO: 0 X10E3/UL (ref 0–0.1)
IMM GRANULOCYTES NFR BLD AUTO: 0 %
IRON SATN MFR SERPL: 4 % (ref 15–55)
IRON SERPL-MCNC: 15 UG/DL (ref 27–159)
LYMPHOCYTES # BLD AUTO: 2.1 X10E3/UL (ref 0.7–3.1)
LYMPHOCYTES NFR BLD AUTO: 19 %
MCH RBC QN AUTO: 21.8 PG (ref 26.6–33)
MCHC RBC AUTO-ENTMCNC: 29.3 G/DL (ref 31.5–35.7)
MCV RBC AUTO: 74 FL (ref 79–97)
MONOCYTES # BLD AUTO: 0.7 X10E3/UL (ref 0.1–0.9)
MONOCYTES NFR BLD AUTO: 6 %
NEUTROPHILS # BLD AUTO: 8.4 X10E3/UL (ref 1.4–7)
NEUTROPHILS NFR BLD AUTO: 74 %
PLATELET # BLD AUTO: 370 X10E3/UL (ref 150–450)
RBC # BLD AUTO: 3.95 X10E6/UL (ref 3.77–5.28)
RETICS/RBC NFR AUTO: 1.3 % (ref 0.6–2.6)
RH BLD: POSITIVE
RPR SER QL: NON REACTIVE
RUBV IGG SERPL IA-ACNC: 3.71 INDEX
TIBC SERPL-MCNC: 382 UG/DL (ref 250–450)
UIBC SERPL-MCNC: 367 UG/DL (ref 131–425)
VZV IGG SER IA-ACNC: 984 INDEX
WBC # BLD AUTO: 11.3 X10E3/UL (ref 3.4–10.8)

## 2022-06-17 PROBLEM — R73.09 ELEVATED HEMOGLOBIN A1C: Status: ACTIVE | Noted: 2022-06-17

## 2022-06-17 LAB
AMPHETAMINES UR QL SCN: NEGATIVE NG/ML
BACTERIA UR CULT: NO GROWTH
BACTERIA UR CULT: NORMAL
BARBITURATES UR QL SCN: NEGATIVE NG/ML
BENZODIAZ UR QL SCN: NEGATIVE NG/ML
BZE UR QL SCN: NEGATIVE NG/ML
CANNABINOIDS UR QL SCN: NEGATIVE NG/ML
CREAT UR-MCNC: 19.7 MG/DL (ref 20–300)
LABORATORY COMMENT REPORT: ABNORMAL
METHADONE UR QL SCN: NEGATIVE NG/ML
OPIATES UR QL SCN: NEGATIVE NG/ML
OXYCODONE+OXYMORPHONE UR QL SCN: NEGATIVE NG/ML
PCP UR QL: NEGATIVE NG/ML
PH UR: 6 [PH] (ref 4.5–8.9)
PROPOXYPH UR QL SCN: NEGATIVE NG/ML
SP GR UR: 1

## 2022-06-19 LAB
C TRACH RRNA CVX QL NAA+PROBE: NEGATIVE
CONV .: NORMAL
CYTOLOGIST CVX/VAG CYTO: NORMAL
CYTOLOGY CVX/VAG DOC CYTO: NORMAL
CYTOLOGY CVX/VAG DOC THIN PREP: NORMAL
DX ICD CODE: NORMAL
HIV 1 & 2 AB SER-IMP: NORMAL
N GONORRHOEA RRNA CVX QL NAA+PROBE: NEGATIVE
OTHER STN SPEC: NORMAL
STAT OF ADQ CVX/VAG CYTO-IMP: NORMAL
T VAGINALIS RRNA SPEC QL NAA+PROBE: NEGATIVE

## 2022-06-23 ENCOUNTER — LAB (OUTPATIENT)
Dept: LAB | Facility: HOSPITAL | Age: 32
End: 2022-06-23

## 2022-06-23 ENCOUNTER — CONSULT (OUTPATIENT)
Dept: ONCOLOGY | Facility: CLINIC | Age: 32
End: 2022-06-23

## 2022-06-23 VITALS
SYSTOLIC BLOOD PRESSURE: 112 MMHG | TEMPERATURE: 98.4 F | HEART RATE: 84 BPM | OXYGEN SATURATION: 98 % | RESPIRATION RATE: 14 BRPM | DIASTOLIC BLOOD PRESSURE: 62 MMHG | WEIGHT: 172 LBS | BODY MASS INDEX: 29.37 KG/M2 | HEIGHT: 64 IN

## 2022-06-23 DIAGNOSIS — D64.9 ANEMIA, UNSPECIFIED TYPE: Primary | ICD-10-CM

## 2022-06-23 DIAGNOSIS — D50.0 IRON DEFICIENCY ANEMIA DUE TO CHRONIC BLOOD LOSS: Primary | ICD-10-CM

## 2022-06-23 PROBLEM — D50.9 IRON DEFICIENCY ANEMIA: Status: ACTIVE | Noted: 2022-06-23

## 2022-06-23 LAB
BASOPHILS # BLD AUTO: 0.03 10*3/MM3 (ref 0–0.2)
BASOPHILS NFR BLD AUTO: 0.3 % (ref 0–1.5)
DEPRECATED RDW RBC AUTO: 50.6 FL (ref 37–54)
EOSINOPHIL # BLD AUTO: 0.11 10*3/MM3 (ref 0–0.4)
EOSINOPHIL NFR BLD AUTO: 1.2 % (ref 0.3–6.2)
ERYTHROCYTE [DISTWIDTH] IN BLOOD BY AUTOMATED COUNT: 19.1 % (ref 12.3–15.4)
HCT VFR BLD AUTO: 30.3 % (ref 34–46.6)
HGB BLD-MCNC: 9.2 G/DL (ref 12–15.9)
IMM GRANULOCYTES # BLD AUTO: 0.03 10*3/MM3 (ref 0–0.05)
IMM GRANULOCYTES NFR BLD AUTO: 0.3 % (ref 0–0.5)
LYMPHOCYTES # BLD AUTO: 1.89 10*3/MM3 (ref 0.7–3.1)
LYMPHOCYTES NFR BLD AUTO: 21.4 % (ref 19.6–45.3)
MCH RBC QN AUTO: 22.5 PG (ref 26.6–33)
MCHC RBC AUTO-ENTMCNC: 30.4 G/DL (ref 31.5–35.7)
MCV RBC AUTO: 74.1 FL (ref 79–97)
MONOCYTES # BLD AUTO: 0.64 10*3/MM3 (ref 0.1–0.9)
MONOCYTES NFR BLD AUTO: 7.2 % (ref 5–12)
NEUTROPHILS NFR BLD AUTO: 6.13 10*3/MM3 (ref 1.7–7)
NEUTROPHILS NFR BLD AUTO: 69.6 % (ref 42.7–76)
NRBC BLD AUTO-RTO: 0 /100 WBC (ref 0–0.2)
PLATELET # BLD AUTO: 381 10*3/MM3 (ref 140–450)
PMV BLD AUTO: 10 FL (ref 6–12)
RBC # BLD AUTO: 4.09 10*6/MM3 (ref 3.77–5.28)
WBC NRBC COR # BLD: 8.83 10*3/MM3 (ref 3.4–10.8)

## 2022-06-23 PROCEDURE — 99204 OFFICE O/P NEW MOD 45 MIN: CPT | Performed by: INTERNAL MEDICINE

## 2022-06-23 PROCEDURE — 85025 COMPLETE CBC W/AUTO DIFF WBC: CPT

## 2022-06-23 RX ORDER — FERROUS SULFATE 325(65) MG
325 TABLET ORAL 2 TIMES DAILY WITH MEALS
Qty: 60 TABLET | Refills: 3 | Status: SHIPPED | OUTPATIENT
Start: 2022-06-23 | End: 2022-07-20 | Stop reason: HOSPADM

## 2022-06-23 NOTE — PROGRESS NOTES
Subjective     REASON FOR CONSULTATION:  Iron def anemia  Provide an opinion on any further workup or treatment                             REQUESTING PHYSICIAN: Iron deficiency anemia    RECORDS OBTAINED:  Records of the patients history including those obtained from the referring provider were reviewed and summarized in detail.      History of Present Illness   Is a very pleasant 31-year-old woman currently 10 weeks gestation sent from OB for evaluation of anemia.  Reviewing the patient's records, she has had some degree of anemia since 7/15/2020 with declining MCV since that time.  Her current hemoglobin is 9.2 with MCV 74.1.  Recent labs have documented iron deficiency with iron saturation 4% and a ferritin of 8.  She has not been on any iron supplement.  She reports menorrhagia prior to pregnancy.    Past Medical History:   Diagnosis Date   • Anxiety    • Bilateral bunions    • Depression    • GERD (gastroesophageal reflux disease)    • Migraine    • Pilonidal cyst     SCHEDULED FOR SX        Past Surgical History:   Procedure Laterality Date   • HAND LACERATION REPAIR      [age] YEAR OLD   • PILONIDAL CYSTECTOMY N/A 7/17/2020    Procedure: PILONIDAL CYSTECTOMY;  Surgeon: Bk Martini MD;  Location: [facility];  Service: General;  Laterality: N/A;   • TONSILLECTOMY     • WISDOM TOOTH EXTRACTION          Current Outpatient Medications on File Prior to Visit   Medication Sig Dispense Refill   • nicotine (Nicoderm CQ) 14 MG/24HR patch Place 1 patch on the skin as directed by provider Daily. 30 each 3   • omeprazole (priLOSEC) 20 MG capsule Take 20 mg by mouth Every Morning.       No current facility-administered medications on file prior to visit.        ALLERGIES:    Allergies   Allergen Reactions   • Penicillins Other (See Comments)     UNSURE OF REACTION   • Propranolol Anaphylaxis        Social History     Socioeconomic History   • Marital status:    Tobacco Use   • Smoking status: Current Every Day  "Smoker     Packs/day: 1.00     Years: 15.00     Pack years: 15.00     Types: Cigarettes   • Smokeless tobacco: Never Used   Substance and Sexual Activity   • Alcohol use: Yes     Comment: monthly 1-2 DRINKS MAYBE MONTHLY   • Drug use: No   • Sexual activity: Yes     Partners: Male     Birth control/protection: None        Family History   Problem Relation Age of Onset   • Depression Mother    • Alcohol abuse Father    • Learning disabilities Sister    • Heart disease Maternal Grandfather    • Breast cancer Neg Hx    • Ovarian cancer Neg Hx    • Colon cancer Neg Hx    • Deep vein thrombosis Neg Hx    • Pulmonary embolism Neg Hx    • Birth defects Neg Hx    • Mental retardation Neg Hx         Review of Systems   Constitutional: Positive for fatigue. Negative for appetite change and unexpected weight change.   HENT: Negative.    Respiratory: Negative.    Cardiovascular: Negative.    Gastrointestinal: Positive for nausea. Negative for constipation and diarrhea.   Genitourinary: Positive for menstrual problem.   Musculoskeletal: Negative.    Skin: Negative.    Neurological: Negative.    Hematological: Negative.    Psychiatric/Behavioral: Negative.           Objective     Vitals:    06/23/22 1118   BP: 112/62   Pulse: 84   Resp: 14   Temp: 98.4 °F (36.9 °C)   TempSrc: Infrared   SpO2: 98%   Weight: 78 kg (172 lb)   Height: 162.9 cm (64.13\")  Comment: new ht - hematology   PainSc: 0-No pain     Current Status 6/23/2022   ECOG score 0       Physical Exam    CONSTITUTIONAL: pleasant well-developed adult woman  HEENT: no icterus, no thrush, moist membranes  NECK: no jvd  LYMPH: no cervical or supraclavicular lad  CV: RRR, S1S2, no murmur  RESP: cta bilat, no wheezing, no rales  GI: soft, non-tender, no splenomegaly, +bs  : Gravid uterus  MUSC: no edema, normal gait  NEURO: alert and oriented x3, normal strength  PSYCH: normal mood and affect    RECENT LABS:  Hematology WBC   Date Value Ref Range Status   06/23/2022 8.83 " 3.40 - 10.80 10*3/mm3 Final   06/15/2022 11.3 (H) 3.4 - 10.8 x10E3/uL Final     RBC   Date Value Ref Range Status   06/23/2022 4.09 3.77 - 5.28 10*6/mm3 Final   06/15/2022 3.95 3.77 - 5.28 x10E6/uL Final     Hemoglobin   Date Value Ref Range Status   06/23/2022 9.2 (L) 12.0 - 15.9 g/dL Final     Hematocrit   Date Value Ref Range Status   06/23/2022 30.3 (L) 34.0 - 46.6 % Final     Platelets   Date Value Ref Range Status   06/23/2022 381 140 - 450 10*3/mm3 Final      Ferritin 8, iron sat 4%    Assessment & Plan     *Iron deficiency anemia secondary to previous menorrhagia and dietary restrictions and PPI use  *10 weeks gestation    Hematology plan/recommendations:  1. Begin ferrous sulfate 325 mg twice daily with food; if intolerant decrease to once daily dosing  2. We will recheck her CBC ferritin iron profile in 3 months.  Once she is beyond 20 weeks gestation if she is not responding well to oral iron or not tolerating we can consider intravenous iron replacement if needed    Thank you for allowing me to participate in the care of this pleasant patient.

## 2022-06-28 ENCOUNTER — ROUTINE PRENATAL (OUTPATIENT)
Dept: OBSTETRICS AND GYNECOLOGY | Facility: CLINIC | Age: 32
End: 2022-06-28

## 2022-06-28 VITALS — SYSTOLIC BLOOD PRESSURE: 120 MMHG | BODY MASS INDEX: 29.57 KG/M2 | DIASTOLIC BLOOD PRESSURE: 68 MMHG | WEIGHT: 173 LBS

## 2022-06-28 DIAGNOSIS — O99.019 MATERNAL ANEMIA IN PREGNANCY, ANTEPARTUM: ICD-10-CM

## 2022-06-28 DIAGNOSIS — Z34.93 PRENATAL CARE IN THIRD TRIMESTER: Primary | ICD-10-CM

## 2022-06-28 DIAGNOSIS — O46.8X1 SUBCHORIONIC HEMORRHAGE OF PLACENTA IN FIRST TRIMESTER, SINGLE OR UNSPECIFIED FETUS: ICD-10-CM

## 2022-06-28 DIAGNOSIS — O41.8X10 SUBCHORIONIC HEMORRHAGE OF PLACENTA IN FIRST TRIMESTER, SINGLE OR UNSPECIFIED FETUS: ICD-10-CM

## 2022-06-28 LAB
GLUCOSE UR STRIP-MCNC: NEGATIVE MG/DL
PROT UR STRIP-MCNC: NEGATIVE MG/DL

## 2022-06-28 PROCEDURE — 99213 OFFICE O/P EST LOW 20 MIN: CPT | Performed by: NURSE PRACTITIONER

## 2022-07-01 DIAGNOSIS — O24.410 GDM (GESTATIONAL DIABETES MELLITUS), CLASS A1: Primary | ICD-10-CM

## 2022-07-01 RX ORDER — DOXYCYCLINE HYCLATE 50 MG/1
324 CAPSULE, GELATIN COATED ORAL 2 TIMES DAILY
Qty: 60 TABLET | Refills: 2 | Status: SHIPPED | OUTPATIENT
Start: 2022-07-01

## 2022-07-01 RX ORDER — LANCETS 30 GAUGE
EACH MISCELLANEOUS
Qty: 120 EACH | Refills: 6 | Status: SHIPPED | OUTPATIENT
Start: 2022-07-01

## 2022-07-01 RX ORDER — BLOOD-GLUCOSE METER
KIT MISCELLANEOUS
Qty: 1 EACH | Refills: 1 | Status: SHIPPED | OUTPATIENT
Start: 2022-07-01

## 2022-07-11 PROBLEM — Z34.93 PRENATAL CARE IN THIRD TRIMESTER: Status: ACTIVE | Noted: 2022-07-11

## 2022-07-13 ENCOUNTER — TELEPHONE (OUTPATIENT)
Dept: OBSTETRICS AND GYNECOLOGY | Facility: CLINIC | Age: 32
End: 2022-07-13

## 2022-07-13 NOTE — TELEPHONE ENCOUNTER
As discussed in her visit, if she has spotting, she needs to go back on pelvic rest. She had a subchorionic hemorrhage that was resolved on US but it may have returned.  Has she had sex? If the discharge is concerning for infection, she needs a NuSwab.

## 2022-07-13 NOTE — TELEPHONE ENCOUNTER
Provider: LALITO ZENDEJAS  Caller: CARLOS CH  Relationship to Patient: SELF  Phone Number: [phone]  Reason for Call: PATIENT CALLED AND STATED THAT SHE HAS HAD DISCHARGE AND SOME SPOTTING AND WOULD LIKE TO SPEAK WITH SOMEONE TO KNOW IF THIS IS NORMAL OR IS THERE IS SOMETHING SHE WOULD DO.   When was the patient last seen: 6/28/22 FOR 11 WEEK PRENATAL VISIT  When did it start: 1 WEEK AGO  Characteristics of symptom/severity: VAGINAL DISCHARGE FOR ABOUT A WEEK, LIGHT SPOTTING TODAY AND EITHER YESTERDAY OR THE DAY BEFORE (SHOWED ON ONE WIPE AFTER USING THE RESTROOM AND THEN STOPPED)

## 2022-07-15 ENCOUNTER — APPOINTMENT (OUTPATIENT)
Dept: ULTRASOUND IMAGING | Facility: HOSPITAL | Age: 32
End: 2022-07-15

## 2022-07-15 ENCOUNTER — HOSPITAL ENCOUNTER (EMERGENCY)
Facility: HOSPITAL | Age: 32
Discharge: HOME | End: 2022-07-15
Attending: EMERGENCY MEDICINE | Admitting: EMERGENCY MEDICINE

## 2022-07-15 VITALS
RESPIRATION RATE: 16 BRPM | TEMPERATURE: 99.1 F | SYSTOLIC BLOOD PRESSURE: 114 MMHG | HEIGHT: 64 IN | BODY MASS INDEX: 29.02 KG/M2 | WEIGHT: 170 LBS | DIASTOLIC BLOOD PRESSURE: 76 MMHG | OXYGEN SATURATION: 97 % | HEART RATE: 88 BPM

## 2022-07-15 DIAGNOSIS — O26.859 SPOTTING IN PREGNANCY: Primary | ICD-10-CM

## 2022-07-15 LAB
BACTERIA UR QL AUTO: ABNORMAL /HPF
BASOPHILS # BLD AUTO: 0.01 10*3/MM3 (ref 0–0.2)
BASOPHILS NFR BLD AUTO: 0.1 % (ref 0–1.5)
BILIRUB UR QL STRIP: NEGATIVE
CLARITY UR: CLEAR
COLOR UR: YELLOW
DEPRECATED RDW RBC AUTO: 56.6 FL (ref 37–54)
EOSINOPHIL # BLD AUTO: 0.07 10*3/MM3 (ref 0–0.4)
EOSINOPHIL NFR BLD AUTO: 0.6 % (ref 0.3–6.2)
ERYTHROCYTE [DISTWIDTH] IN BLOOD BY AUTOMATED COUNT: 19.4 % (ref 12.3–15.4)
GLUCOSE UR STRIP-MCNC: NEGATIVE MG/DL
HCG INTACT+B SERPL-ACNC: NORMAL MIU/ML
HCT VFR BLD AUTO: 31.5 % (ref 34–46.6)
HGB BLD-MCNC: 9.4 G/DL (ref 12–15.9)
HGB UR QL STRIP.AUTO: ABNORMAL
HYALINE CASTS UR QL AUTO: ABNORMAL /LPF
IMM GRANULOCYTES # BLD AUTO: 0.02 10*3/MM3 (ref 0–0.05)
IMM GRANULOCYTES NFR BLD AUTO: 0.2 % (ref 0–0.5)
KETONES UR QL STRIP: NEGATIVE
LEUKOCYTE ESTERASE UR QL STRIP.AUTO: ABNORMAL
LYMPHOCYTES # BLD AUTO: 1.33 10*3/MM3 (ref 0.7–3.1)
LYMPHOCYTES NFR BLD AUTO: 11 % (ref 19.6–45.3)
MCH RBC QN AUTO: 23.5 PG (ref 26.6–33)
MCHC RBC AUTO-ENTMCNC: 29.8 G/DL (ref 31.5–35.7)
MCV RBC AUTO: 78.8 FL (ref 79–97)
MONOCYTES # BLD AUTO: 0.5 10*3/MM3 (ref 0.1–0.9)
MONOCYTES NFR BLD AUTO: 4.2 % (ref 5–12)
NEUTROPHILS NFR BLD AUTO: 10.11 10*3/MM3 (ref 1.7–7)
NEUTROPHILS NFR BLD AUTO: 83.9 % (ref 42.7–76)
NITRITE UR QL STRIP: NEGATIVE
PH UR STRIP.AUTO: 6 [PH] (ref 4.5–8)
PLATELET # BLD AUTO: 287 10*3/MM3 (ref 140–450)
PMV BLD AUTO: 9.9 FL (ref 6–12)
PROT UR QL STRIP: NEGATIVE
RBC # BLD AUTO: 4 10*6/MM3 (ref 3.77–5.28)
RBC # UR STRIP: ABNORMAL /HPF
REF LAB TEST METHOD: ABNORMAL
SP GR UR STRIP: 1.02 (ref 1–1.03)
SQUAMOUS #/AREA URNS HPF: ABNORMAL /HPF
UROBILINOGEN UR QL STRIP: ABNORMAL
WBC # UR STRIP: ABNORMAL /HPF
WBC NRBC COR # BLD: 12.04 10*3/MM3 (ref 3.4–10.8)

## 2022-07-15 PROCEDURE — 76801 OB US < 14 WKS SINGLE FETUS: CPT

## 2022-07-15 PROCEDURE — 84702 CHORIONIC GONADOTROPIN TEST: CPT | Performed by: EMERGENCY MEDICINE

## 2022-07-15 PROCEDURE — 81001 URINALYSIS AUTO W/SCOPE: CPT | Performed by: EMERGENCY MEDICINE

## 2022-07-15 PROCEDURE — 99284 EMERGENCY DEPT VISIT MOD MDM: CPT

## 2022-07-15 PROCEDURE — 85025 COMPLETE CBC W/AUTO DIFF WBC: CPT | Performed by: EMERGENCY MEDICINE

## 2022-07-15 PROCEDURE — 36415 COLL VENOUS BLD VENIPUNCTURE: CPT

## 2022-07-15 RX ORDER — SODIUM CHLORIDE 0.9 % (FLUSH) 0.9 %
10 SYRINGE (ML) INJECTION AS NEEDED
Status: DISCONTINUED | OUTPATIENT
Start: 2022-07-15 | End: 2022-07-15 | Stop reason: HOSPADM

## 2022-07-15 NOTE — ED PROVIDER NOTES
Subjective   History of Present Illness  31-year-old female approximately 13 weeks gestation presents to the emergency room with complaint of lower abdominal cramping/discomfort x2 days and light spotting for about a day and a half.  No severe pain no dysuria no other complaints.  Denies shortness of breath denies lightheadedness denies chest pain.  Review of Systems   Constitutional: Negative.    Respiratory: Negative.    Cardiovascular: Negative.    Gastrointestinal:        As noted in HPI   Genitourinary:        As noted in HPI   Musculoskeletal: Negative.    Neurological: Negative.        Past Medical History:   Diagnosis Date   • Anxiety    • Bilateral bunions    • Chronic mental illness    • Depression    • GERD (gastroesophageal reflux disease)    • Migraine    • Pilonidal cyst     SCHEDULED FOR SX       Allergies   Allergen Reactions   • Penicillins Other (See Comments)     UNSURE OF REACTION   • Propranolol Anaphylaxis       Past Surgical History:   Procedure Laterality Date   • HAND LACERATION REPAIR      [age] YEAR OLD   • PILONIDAL CYSTECTOMY N/A 7/17/2020    Procedure: PILONIDAL CYSTECTOMY;  Surgeon: Bk Martini MD;  Location: [facility];  Service: General;  Laterality: N/A;   • TONSILLECTOMY     • WISDOM TOOTH EXTRACTION         Family History   Problem Relation Age of Onset   • Depression Mother    • Alcohol abuse Father    • Learning disabilities Sister    • Depression Maternal Grandmother    • Heart disease Maternal Grandfather    • Breast cancer Neg Hx    • Ovarian cancer Neg Hx    • Colon cancer Neg Hx    • Deep vein thrombosis Neg Hx    • Pulmonary embolism Neg Hx    • Birth defects Neg Hx    • Mental retardation Neg Hx        Social History     Socioeconomic History   • Marital status:    Tobacco Use   • Smoking status: Current Every Day Smoker     Packs/day: 1.00     Years: 15.00     Pack years: 15.00     Types: Cigarettes   • Smokeless tobacco: Never Used   Substance and Sexual  Activity   • Alcohol use: Yes     Comment: monthly 1-2 DRINKS MAYBE MONTHLY   • Drug use: No   • Sexual activity: Yes     Partners: Male     Birth control/protection: None           Objective    ED Triage Vitals [07/15/22 0951]   Temp Heart Rate Resp BP SpO2   99.1 °F (37.3 °C) 96 16 126/67 97 %      Temp src Heart Rate Source Patient Position BP Location FiO2 (%)   Oral Monitor -- -- --       Physical Exam  INITIAL VITAL SIGNS: Reviewed by me.  Pulse ox normal  GENERAL: Alert and interactive. No acute distress.  HEAD: Head is normocephalic.  EYES: EOMI. PERRL. No scleral icterus. No conjunctival injection.  ENT: Moist mucous membranes.   NECK: Supple. Full range of motion.  RESPIRATORY: No tachypnea..  ABDOMEN: Soft, nondistended, nontender to palpation  PELVIC: External genitalia normal, speculum exam reveals scant dark blood in the vault, no bleeding currently from the cervix.  Cervix appears normal.  BACK:  No obvious deformity.  EXTREMITIES: No deformity. No clubbing or cyanosis. No edema.   SKIN: Warm and dry. No diaphoresis. No obvious rashes.   NEUROLOGIC: Alert and oriented. Face is symmetric. Speech is normal. Moves all extremities equally. Motor and sensory distally intact.   Results for orders placed or performed during the hospital encounter of 07/15/22   hCG, Quantitative, Pregnancy    Specimen: Blood   Result Value Ref Range    HCG Quantitative 62,076.00 mIU/mL   Urinalysis With Microscopic If Indicated (No Culture) - Urine, Clean Catch    Specimen: Urine, Clean Catch   Result Value Ref Range    Color, UA Yellow Yellow, Straw    Appearance, UA Clear Clear    pH, UA 6.0 4.5 - 8.0    Specific Gravity, UA 1.022 1.003 - 1.030    Glucose, UA Negative Negative    Ketones, UA Negative Negative    Bilirubin, UA Negative Negative    Blood, UA Large (3+) (A) Negative    Protein, UA Negative Negative    Leuk Esterase, UA Moderate (2+) (A) Negative    Nitrite, UA Negative Negative    Urobilinogen, UA 0.2 E.U./dL  0.2 - 1.0 E.U./dL   CBC Auto Differential    Specimen: Blood   Result Value Ref Range    WBC 12.04 (H) 3.40 - 10.80 10*3/mm3    RBC 4.00 3.77 - 5.28 10*6/mm3    Hemoglobin 9.4 (L) 12.0 - 15.9 g/dL    Hematocrit 31.5 (L) 34.0 - 46.6 %    MCV 78.8 (L) 79.0 - 97.0 fL    MCH 23.5 (L) 26.6 - 33.0 pg    MCHC 29.8 (L) 31.5 - 35.7 g/dL    RDW 19.4 (H) 12.3 - 15.4 %    RDW-SD 56.6 (H) 37.0 - 54.0 fl    MPV 9.9 6.0 - 12.0 fL    Platelets 287 140 - 450 10*3/mm3    Neutrophil % 83.9 (H) 42.7 - 76.0 %    Lymphocyte % 11.0 (L) 19.6 - 45.3 %    Monocyte % 4.2 (L) 5.0 - 12.0 %    Eosinophil % 0.6 0.3 - 6.2 %    Basophil % 0.1 0.0 - 1.5 %    Immature Grans % 0.2 0.0 - 0.5 %    Neutrophils, Absolute 10.11 (H) 1.70 - 7.00 10*3/mm3    Lymphocytes, Absolute 1.33 0.70 - 3.10 10*3/mm3    Monocytes, Absolute 0.50 0.10 - 0.90 10*3/mm3    Eosinophils, Absolute 0.07 0.00 - 0.40 10*3/mm3    Basophils, Absolute 0.01 0.00 - 0.20 10*3/mm3    Immature Grans, Absolute 0.02 0.00 - 0.05 10*3/mm3   Urinalysis, Microscopic Only - Urine, Clean Catch    Specimen: Urine, Clean Catch   Result Value Ref Range    RBC, UA 21-30 (A) None Seen /HPF    WBC, UA 21-30 (A) None Seen /HPF    Bacteria, UA Trace (A) None Seen /HPF    Squamous Epithelial Cells, UA 7-12 (A) None Seen, 0-2 /HPF    Hyaline Casts, UA None Seen None Seen /LPF    Methodology Manual Light Microscopy      US Ob < 14 Weeks Single or First Gestation    Result Date: 7/15/2022  Exam: Early OB ultrasound 07/15/2022  INDICATION: T-cell hip pain for 2 days with spotting.  FINDINGS: Transabdominal imaging was performed. A single intrauterine pregnancy is identified. The crown-rump length is 74.23 mm right estimated gestational age of 13 weeks 4 days.  Abdominal circumference is 6.87 cm. Femur length is 1.19 cm head circumference is 8.61 cm fetal heart rate is 170 bpm. Cervix measures 3.2 cm and appears normal. Amniotic fluid volume is normal.. Placenta is anterior      Gestational age is approximately  13 weeks 6 days.  Fetal heart rate is 1 70 bpm  Cervical length is 3.2 cm.  No abnormalities are visualized.  This report was finalized on 7/15/2022 11:33 AM by Dr. Clayton Pemberton MD.        Procedures           ED Course  ED Course as of 07/15/22 1211   Fri Jul 15, 2022   0952 Patient at 13 weeks gestation has had some spotting last day and a half.  Mild suprapubic discomfort but no pain with palpation.  On exam there is no bleeding coming from the cervix currently and there is very scant blood in the vaginal vault.  Will obtain ultrasound.  She has been positive for labs so will not need RhoGAM.  Doubt she has ectopic as she is at 13 weeks gestation with confirmed IUP.  More likely to be subchorionic hematoma or threatened  but threatened  less likely as she does not have significant bleeding or pain. [RO]   1208 Ultrasounds normal, leukocytes on UA but dirty urine sample.  No clinical symptoms of UTI.  With only trace bacteria but 7-12 squamous epithelials I doubt she has asymptomatic bacteriuria and does not need any antibiotics. [RO]      ED Course User Index  [RO] Shaan Moise MD                                           [facility]    Final diagnoses:   Spotting in pregnancy       ED Disposition  ED Disposition     ED Disposition   Discharge    Condition   Good    Comment   --             Pascale Yañez, APRN  [address]  [facility] [address] [phone]    Call   To schedule follow-up appointment         Medication List      No changes were made to your prescriptions during this visit.          Shaan Moise MD  07/15/22 1211

## 2022-07-15 NOTE — DISCHARGE INSTRUCTIONS
Please follow-up with obstetrics, either at your next appointment or earlier if they desire.  Return the emergency room if you develop chest pain, difficulty breathing, severe abdominal pain or for any other concerns.

## 2022-07-19 ENCOUNTER — HOSPITAL ENCOUNTER (OUTPATIENT)
Facility: HOSPITAL | Age: 32
Setting detail: OBSERVATION
Discharge: HOME | End: 2022-07-20
Attending: OBSTETRICS & GYNECOLOGY | Admitting: OBSTETRICS & GYNECOLOGY

## 2022-07-19 DIAGNOSIS — O36.80X0 ENCOUNTER TO DETERMINE FETAL VIABILITY OF PREGNANCY, SINGLE OR UNSPECIFIED FETUS: ICD-10-CM

## 2022-07-19 DIAGNOSIS — N88.3 SHORT CERVIX: ICD-10-CM

## 2022-07-19 DIAGNOSIS — O46.90 VAGINAL BLEEDING IN PREGNANCY: ICD-10-CM

## 2022-07-19 PROBLEM — O99.019 MATERNAL ANEMIA IN PREGNANCY, ANTEPARTUM: Status: RESOLVED | Noted: 2022-06-15 | Resolved: 2022-07-19

## 2022-07-19 PROBLEM — O41.8X10 SUBCHORIONIC HEMORRHAGE IN FIRST TRIMESTER: Status: RESOLVED | Noted: 2022-06-15 | Resolved: 2022-07-19

## 2022-07-19 PROBLEM — Z34.93 PRENATAL CARE IN THIRD TRIMESTER: Status: RESOLVED | Noted: 2022-07-11 | Resolved: 2022-07-19

## 2022-07-19 PROBLEM — Z34.91 INITIAL OBSTETRIC VISIT IN FIRST TRIMESTER: Status: RESOLVED | Noted: 2022-06-15 | Resolved: 2022-07-19

## 2022-07-19 PROBLEM — O46.8X1 SUBCHORIONIC HEMORRHAGE IN FIRST TRIMESTER: Status: RESOLVED | Noted: 2022-06-15 | Resolved: 2022-07-19

## 2022-07-19 LAB
ABO GROUP BLD: NORMAL
ALBUMIN SERPL-MCNC: 3.8 G/DL (ref 3.5–5.2)
ALBUMIN/GLOB SERPL: 1.4 G/DL
ALP SERPL-CCNC: 139 U/L (ref 39–117)
ALT SERPL W P-5'-P-CCNC: 35 U/L (ref 1–33)
ANION GAP SERPL CALCULATED.3IONS-SCNC: 10.7 MMOL/L (ref 5–15)
AST SERPL-CCNC: 37 U/L (ref 1–32)
BASOPHILS # BLD AUTO: 0.03 10*3/MM3 (ref 0–0.2)
BASOPHILS NFR BLD AUTO: 0.2 % (ref 0–1.5)
BILIRUB SERPL-MCNC: 0.3 MG/DL (ref 0–1.2)
BLD GP AB SCN SERPL QL: NEGATIVE
BUN SERPL-MCNC: 4 MG/DL (ref 6–20)
BUN/CREAT SERPL: 7.7 (ref 7–25)
CALCIUM SPEC-SCNC: 9.2 MG/DL (ref 8.6–10.5)
CHLORIDE SERPL-SCNC: 103 MMOL/L (ref 98–107)
CO2 SERPL-SCNC: 20.3 MMOL/L (ref 22–29)
CREAT SERPL-MCNC: 0.52 MG/DL (ref 0.57–1)
DEPRECATED RDW RBC AUTO: 54.1 FL (ref 37–54)
EGFRCR SERPLBLD CKD-EPI 2021: 129.2 ML/MIN/1.73
EOSINOPHIL # BLD AUTO: 0.11 10*3/MM3 (ref 0–0.4)
EOSINOPHIL NFR BLD AUTO: 0.7 % (ref 0.3–6.2)
ERYTHROCYTE [DISTWIDTH] IN BLOOD BY AUTOMATED COUNT: 19.2 % (ref 12.3–15.4)
GLOBULIN UR ELPH-MCNC: 2.7 GM/DL
GLUCOSE SERPL-MCNC: 89 MG/DL (ref 65–99)
HCT VFR BLD AUTO: 30.4 % (ref 34–46.6)
HGB BLD-MCNC: 9.4 G/DL (ref 12–15.9)
IMM GRANULOCYTES # BLD AUTO: 0.04 10*3/MM3 (ref 0–0.05)
IMM GRANULOCYTES NFR BLD AUTO: 0.2 % (ref 0–0.5)
LYMPHOCYTES # BLD AUTO: 1.56 10*3/MM3 (ref 0.7–3.1)
LYMPHOCYTES NFR BLD AUTO: 9.5 % (ref 19.6–45.3)
MCH RBC QN AUTO: 23.6 PG (ref 26.6–33)
MCHC RBC AUTO-ENTMCNC: 30.9 G/DL (ref 31.5–35.7)
MCV RBC AUTO: 76.2 FL (ref 79–97)
MONOCYTES # BLD AUTO: 0.84 10*3/MM3 (ref 0.1–0.9)
MONOCYTES NFR BLD AUTO: 5.1 % (ref 5–12)
NEUTROPHILS NFR BLD AUTO: 13.83 10*3/MM3 (ref 1.7–7)
NEUTROPHILS NFR BLD AUTO: 84.3 % (ref 42.7–76)
PLATELET # BLD AUTO: 316 10*3/MM3 (ref 140–450)
PMV BLD AUTO: 10 FL (ref 6–12)
POTASSIUM SERPL-SCNC: 3.8 MMOL/L (ref 3.5–5.2)
PROT SERPL-MCNC: 6.5 G/DL (ref 6–8.5)
RBC # BLD AUTO: 3.99 10*6/MM3 (ref 3.77–5.28)
RH BLD: POSITIVE
SARS-COV-2 RNA PNL SPEC NAA+PROBE: NOT DETECTED
SODIUM SERPL-SCNC: 134 MMOL/L (ref 136–145)
T&S EXPIRATION DATE: NORMAL
WBC NRBC COR # BLD: 16.41 10*3/MM3 (ref 3.4–10.8)

## 2022-07-19 PROCEDURE — C9803 HOPD COVID-19 SPEC COLLECT: HCPCS

## 2022-07-19 PROCEDURE — 87635 SARS-COV-2 COVID-19 AMP PRB: CPT | Performed by: OBSTETRICS & GYNECOLOGY

## 2022-07-19 PROCEDURE — 85025 COMPLETE CBC W/AUTO DIFF WBC: CPT | Performed by: OBSTETRICS & GYNECOLOGY

## 2022-07-19 PROCEDURE — G0378 HOSPITAL OBSERVATION PER HR: HCPCS

## 2022-07-19 PROCEDURE — 86850 RBC ANTIBODY SCREEN: CPT | Performed by: OBSTETRICS & GYNECOLOGY

## 2022-07-19 PROCEDURE — 80053 COMPREHEN METABOLIC PANEL: CPT | Performed by: OBSTETRICS & GYNECOLOGY

## 2022-07-19 PROCEDURE — 86901 BLOOD TYPING SEROLOGIC RH(D): CPT | Performed by: OBSTETRICS & GYNECOLOGY

## 2022-07-19 PROCEDURE — 86900 BLOOD TYPING SEROLOGIC ABO: CPT | Performed by: OBSTETRICS & GYNECOLOGY

## 2022-07-19 PROCEDURE — G0379 DIRECT REFER HOSPITAL OBSERV: HCPCS

## 2022-07-19 PROCEDURE — 99219 PR INITIAL OBSERVATION CARE/DAY 50 MINUTES: CPT | Performed by: OBSTETRICS & GYNECOLOGY

## 2022-07-19 RX ORDER — SODIUM CHLORIDE 0.9 % (FLUSH) 0.9 %
10 SYRINGE (ML) INJECTION EVERY 12 HOURS SCHEDULED
Status: DISCONTINUED | OUTPATIENT
Start: 2022-07-19 | End: 2022-07-20 | Stop reason: HOSPADM

## 2022-07-19 RX ORDER — ACETAMINOPHEN 325 MG/1
650 TABLET ORAL EVERY 6 HOURS PRN
Status: DISCONTINUED | OUTPATIENT
Start: 2022-07-19 | End: 2022-07-20 | Stop reason: HOSPADM

## 2022-07-19 RX ORDER — ACETAMINOPHEN 325 MG/1
650 TABLET ORAL ONCE
Status: COMPLETED | OUTPATIENT
Start: 2022-07-19 | End: 2022-07-19

## 2022-07-19 RX ORDER — SODIUM CHLORIDE 0.9 % (FLUSH) 0.9 %
10 SYRINGE (ML) INJECTION AS NEEDED
Status: DISCONTINUED | OUTPATIENT
Start: 2022-07-19 | End: 2022-07-20 | Stop reason: HOSPADM

## 2022-07-19 RX ORDER — SODIUM CHLORIDE, SODIUM LACTATE, POTASSIUM CHLORIDE, CALCIUM CHLORIDE 600; 310; 30; 20 MG/100ML; MG/100ML; MG/100ML; MG/100ML
125 INJECTION, SOLUTION INTRAVENOUS CONTINUOUS
Status: DISCONTINUED | OUTPATIENT
Start: 2022-07-19 | End: 2022-07-20 | Stop reason: HOSPADM

## 2022-07-19 RX ADMIN — ACETAMINOPHEN 650 MG: 325 TABLET ORAL at 17:08

## 2022-07-19 RX ADMIN — ACETAMINOPHEN 650 MG: 325 TABLET ORAL at 23:13

## 2022-07-19 RX ADMIN — SODIUM CHLORIDE, POTASSIUM CHLORIDE, SODIUM LACTATE AND CALCIUM CHLORIDE 125 ML/HR: 600; 310; 30; 20 INJECTION, SOLUTION INTRAVENOUS at 10:08

## 2022-07-19 RX ADMIN — ACETAMINOPHEN 650 MG: 325 TABLET ORAL at 11:14

## 2022-07-19 NOTE — PLAN OF CARE
Problem: Adult Inpatient Plan of Care  Goal: Plan of Care Review  Outcome: Ongoing, Progressing  Goal: Patient-Specific Goal (Individualized)  Outcome: Ongoing, Progressing  Goal: Absence of Hospital-Acquired Illness or Injury  Outcome: Ongoing, Progressing  Intervention: Identify and Manage Fall Risk  Recent Flowsheet Documentation  Taken 2022 1522 by Honey Simon, RN  Safety Promotion/Fall Prevention: safety round/check completed  Goal: Optimal Comfort and Wellbeing  Outcome: Ongoing, Progressing  Intervention: Monitor Pain and Promote Comfort  Recent Flowsheet Documentation  Taken 2022 1708 by Honey Simon, RN  Pain Management Interventions: see MAR  Goal: Readiness for Transition of Care  Outcome: Ongoing, Progressing     Problem:  Loss  Goal: Optimal Adjustment to Loss  Outcome: Ongoing, Progressing   Goal Outcome Evaluation:

## 2022-07-19 NOTE — H&P
ADMISSION HISTORY AND PHYSICAL      Patient Care Team:  Jesús Baker MD as PCP - General (Family Medicine)  Pascale Yañez APRN as Referring Physician (Obstetrics and Gynecology)  Pete Matos MD as Consulting Physician (Hematology and Oncology)  Delmar Kim MD as Consulting Physician (Obstetrics and Gynecology)    Chief complaint: Threatened     Pt is a 31 y.o.   Patient's last menstrual period was 2022.     HPI:Pt admitted for observation.  Pt seen in ER last Friday for bleeding; u/s showed:   Gestational age is approximately 13 weeks 6 days.Fetal heart rate is 1 70 bpm. Cervical length is 3.2 cm. No abnormalities are visualized.    Pt here in office for f/u u/s.  Pt bleeding moderately heavily and cramping a lot.  Pt has not passed any tissue and hasn't noticed any signs of ruptured membranes.     This was a spontaneous delivery.    U/s in office today showed: ceph VIUP w/ , CL now 1.2, fetal head in lower uterine segment.  Pt actively bleeding.  Cx:  Closed firm. AF seen.    Offered expectant mgmt w/ observation v. Attempted rescue cerclage, but in light of amt of bleeding and closed firm cervix, likelihood of success is low.  R/b/a explained to pt who verbalized her understanding.    Pt is rh pos.    Pt agreed to in house observation.        PMHx:   Past Medical History:   Diagnosis Date   • Anxiety    • Bilateral bunions    • Chronic mental illness    • Depression    • GERD (gastroesophageal reflux disease)    • Migraine    • Pilonidal cyst     SCHEDULED FOR SX       Current problem list:  Patient Active Problem List   Diagnosis   • Pilonidal cyst   • Threatened    • Cigarette smoker   • History of miscarriage, currently pregnant   • Elevated hemoglobin A1c: failed early 2hr GTT. Ref to diabetes education.    • Iron deficiency anemia       PSHx:   Past Surgical History:   Procedure Laterality Date   • HAND LACERATION REPAIR      [age] YEAR OLD   •  PILONIDAL CYSTECTOMY N/A 2020    Procedure: PILONIDAL CYSTECTOMY;  Surgeon: Bk Martini MD;  Location: [facility] OR;  Service: General;  Laterality: N/A;   • TONSILLECTOMY     • WISDOM TOOTH EXTRACTION         Social Hx:   Social History     Socioeconomic History   • Marital status:    Tobacco Use   • Smoking status: Current Every Day Smoker     Packs/day: 1.00     Years: 15.00     Pack years: 15.00     Types: Cigarettes   • Smokeless tobacco: Never Used   Substance and Sexual Activity   • Alcohol use: Yes     Comment: monthly 1-2 DRINKS MAYBE MONTHLY   • Drug use: No   • Sexual activity: Yes     Partners: Male     Birth control/protection: None       FHx:   Family History   Problem Relation Age of Onset   • Depression Mother    • Alcohol abuse Father    • Learning disabilities Sister    • Depression Maternal Grandmother    • Heart disease Maternal Grandfather    • Breast cancer Neg Hx    • Ovarian cancer Neg Hx    • Colon cancer Neg Hx    • Deep vein thrombosis Neg Hx    • Pulmonary embolism Neg Hx    • Birth defects Neg Hx    • Mental retardation Neg Hx        Debilities/Disabilities Identified: None    Emotional Behavior: Appropriate    PGyn Hx:  otherwise noncontributory    POBHx:   OB History    Para Term  AB Living   2 0 0 0 1 0   SAB IAB Ectopic Molar Multiple Live Births   1 0 0 0 0 0      # Outcome Date GA Lbr Eric/2nd Weight Sex Delivery Anes PTL Lv   2 Current            1 SAB                Allergies: Penicillins and Propranolol    Medications:   Medications Prior to Admission   Medication Sig Dispense Refill Last Dose   • ferrous gluconate (FERGON) 324 MG tablet Take 1 tablet by mouth 2 (Two) Times a Day. 60 tablet 2    • ferrous sulfate 325 (65 FE) MG tablet Take 1 tablet by mouth 2 (Two) Times a Day With Meals. 60 tablet 3    • glucose blood test strip Check blood sugars 4 times daily 120 each 6    • glucose monitor monitoring kit Check blood sugar 4 times daily 1 each 1     • Lancets misc Check blood sugar 4 times daily 120 each 6    • nicotine (Nicoderm CQ) 14 MG/24HR patch Place 1 patch on the skin as directed by provider Daily. 30 each 3    • omeprazole (priLOSEC) 20 MG capsule Take 20 mg by mouth Every Morning.                                 Current Facility-Administered Medications:   •  lactated ringers infusion, 125 mL/hr, Intravenous, Continuous, Delmar Kim MD  •  sodium chloride 0.9 % flush 10 mL, 10 mL, Intravenous, Q12H, Delmar Kim MD  •  sodium chloride 0.9 % flush 10 mL, 10 mL, Intravenous, PRN, Delmar Kim MD        Review of Systems   Constitutional: Negative.    HENT: Negative.    Eyes: Negative.    Respiratory: Negative.    Cardiovascular: Negative.    Gastrointestinal: Negative.    Endocrine: Negative.    Genitourinary: Positive for pelvic pain and vaginal bleeding.   Musculoskeletal: Negative.    Skin: Negative.    Allergic/Immunologic: Negative.    Neurological: Negative.    Hematological: Negative.    Psychiatric/Behavioral: Negative.        Vital Signs  [facility] 04/11/2022     Physical Exam  Vitals and nursing note reviewed.   Constitutional:       Appearance: She is well-developed.   HENT:      Head: Normocephalic and atraumatic.   Cardiovascular:      Rate and Rhythm: Normal rate.   Pulmonary:      Effort: Pulmonary effort is normal.   Abdominal:      General: There is no distension.      Palpations: Abdomen is soft. There is no mass.      Tenderness: There is no abdominal tenderness. There is no guarding.   Genitourinary:     Vagina: No vaginal discharge.   Musculoskeletal:         General: No tenderness or deformity. Normal range of motion.      Cervical back: Normal range of motion.   Skin:     General: Skin is warm and dry.      Coloration: Skin is not pale.      Findings: No erythema or rash.   Neurological:      Mental Status: She is alert and oriented to person, place, and time.   Psychiatric:          Behavior: Behavior normal.         Thought Content: Thought content normal.         Judgment: Judgment normal.             IMPRESSION:    Threatened  @ 14 weeks.                                    PLAN:    Admission with observatin             I discussed the patients findings and my recommendations with patient and family.     Delmar Kim MD  22  09:56 EDT

## 2022-07-20 ENCOUNTER — TELEPHONE (OUTPATIENT)
Dept: DIABETES SERVICES | Facility: HOSPITAL | Age: 32
End: 2022-07-20

## 2022-07-20 ENCOUNTER — HOSPITAL ENCOUNTER (OUTPATIENT)
Facility: HOSPITAL | Age: 32
End: 2022-07-20
Attending: OBSTETRICS & GYNECOLOGY | Admitting: OBSTETRICS & GYNECOLOGY

## 2022-07-20 ENCOUNTER — TELEPHONE (OUTPATIENT)
Dept: OBSTETRICS AND GYNECOLOGY | Facility: CLINIC | Age: 32
End: 2022-07-20

## 2022-07-20 VITALS
OXYGEN SATURATION: 99 % | HEART RATE: 65 BPM | DIASTOLIC BLOOD PRESSURE: 54 MMHG | WEIGHT: 173 LBS | HEIGHT: 64 IN | TEMPERATURE: 99.1 F | SYSTOLIC BLOOD PRESSURE: 98 MMHG | RESPIRATION RATE: 16 BRPM | BODY MASS INDEX: 29.53 KG/M2

## 2022-07-20 DIAGNOSIS — O26.879 SHORT CERVIX AFFECTING PREGNANCY: Primary | ICD-10-CM

## 2022-07-20 PROCEDURE — 99217 PR OBSERVATION CARE DISCHARGE MANAGEMENT: CPT | Performed by: OBSTETRICS & GYNECOLOGY

## 2022-07-20 PROCEDURE — G0378 HOSPITAL OBSERVATION PER HR: HCPCS

## 2022-07-20 PROCEDURE — 25010000002 ONDANSETRON PER 1 MG

## 2022-07-20 PROCEDURE — 96374 THER/PROPH/DIAG INJ IV PUSH: CPT

## 2022-07-20 RX ORDER — OXYCODONE HYDROCHLORIDE AND ACETAMINOPHEN 5; 325 MG/1; MG/1
2 TABLET ORAL EVERY 4 HOURS PRN
Status: DISCONTINUED | OUTPATIENT
Start: 2022-07-20 | End: 2022-07-20 | Stop reason: HOSPADM

## 2022-07-20 RX ORDER — OXYCODONE HYDROCHLORIDE AND ACETAMINOPHEN 5; 325 MG/1; MG/1
1 TABLET ORAL EVERY 4 HOURS PRN
Status: DISCONTINUED | OUTPATIENT
Start: 2022-07-20 | End: 2022-07-20 | Stop reason: HOSPADM

## 2022-07-20 RX ORDER — ONDANSETRON 2 MG/ML
4 INJECTION INTRAMUSCULAR; INTRAVENOUS EVERY 6 HOURS PRN
Status: DISCONTINUED | OUTPATIENT
Start: 2022-07-20 | End: 2022-07-20 | Stop reason: HOSPADM

## 2022-07-20 RX ORDER — ONDANSETRON 2 MG/ML
INJECTION INTRAMUSCULAR; INTRAVENOUS
Status: COMPLETED
Start: 2022-07-20 | End: 2022-07-20

## 2022-07-20 RX ORDER — OXYCODONE HYDROCHLORIDE AND ACETAMINOPHEN 5; 325 MG/1; MG/1
TABLET ORAL
Status: COMPLETED
Start: 2022-07-20 | End: 2022-07-20

## 2022-07-20 RX ADMIN — ACETAMINOPHEN 650 MG: 325 TABLET ORAL at 11:15

## 2022-07-20 RX ADMIN — OXYCODONE HYDROCHLORIDE AND ACETAMINOPHEN 2 TABLET: 5; 325 TABLET ORAL at 01:04

## 2022-07-20 RX ADMIN — ONDANSETRON 4 MG: 2 INJECTION INTRAMUSCULAR; INTRAVENOUS at 01:05

## 2022-07-20 NOTE — NURSING NOTE
Discharge instructions given to pt at bedside. Questions answered and pt verbalizes understanding.

## 2022-07-20 NOTE — NURSING NOTE
Per Dr Hernandez, pt may be discharged home with M referral & follow up. MD to place discharge orders.

## 2022-07-20 NOTE — CASE MANAGEMENT/SOCIAL WORK
Case Management Discharge Note      Final Note: Discharged home.         Selected Continued Care - Discharged on 7/20/2022 Admission date: 7/19/2022 - Discharge disposition: Home or Self Care    Destination    No services have been selected for the patient.              Durable Medical Equipment    No services have been selected for the patient.              Dialysis/Infusion    No services have been selected for the patient.              Home Medical Care    No services have been selected for the patient.              Therapy    No services have been selected for the patient.              Community Resources    No services have been selected for the patient.              Community & DME    No services have been selected for the patient.                       Final Discharge Disposition Code: 01 - home or self-care

## 2022-07-20 NOTE — PLAN OF CARE
Problem: Adult Inpatient Plan of Care  Goal: Plan of Care Review  Outcome: Ongoing, Progressing  Flowsheets (Taken 7/20/2022 0612)  Progress: no change  Plan of Care Reviewed With: patient  Outcome Evaluation:   VSS. Pt passed significant clot during night   bright red bleeding. Percocet given for pain. Pt able to sleep. States feeling better this am. U/S at 0830.   Goal Outcome Evaluation:  Plan of Care Reviewed With: patient        Progress: no change  Outcome Evaluation: VSS. Pt passed significant clot during night; bright red bleeding. Percocet given for pain. Pt able to sleep. States feeling better this am. U/S at 0830.

## 2022-07-20 NOTE — PLAN OF CARE
Goal Outcome Evaluation:  Plan of Care Reviewed With: patient        Progress: improving  Outcome Evaluation: VSS. Viable baby on US, pt to follow up with MFM.      Problem: Adult Inpatient Plan of Care  Goal: Plan of Care Review  Outcome: Met  Flowsheets (Taken 2022 1416)  Progress: improving  Plan of Care Reviewed With: patient  Outcome Evaluation: VSS. Viable baby on US, pt to follow up with MFM.  Goal: Patient-Specific Goal (Individualized)  Outcome: Met  Goal: Absence of Hospital-Acquired Illness or Injury  Outcome: Met  Goal: Optimal Comfort and Wellbeing  Outcome: Met  Goal: Readiness for Transition of Care  Outcome: Met  Intervention: Mutually Develop Transition Plan  Recent Flowsheet Documentation  Taken 2022 1400 by Svetlana Srinivasan, RN  Transportation Concerns: none     Problem:  Loss  Goal: Optimal Adjustment to Loss  Outcome: Met

## 2022-07-20 NOTE — DISCHARGE INSTRUCTIONS
If you have any other questions or concerns, or your condition worsens, call the office at [phone] to speak to a provider. If you are calling after hours, press option 8 to speak to the doctor on call.

## 2022-07-20 NOTE — DISCHARGE SUMMARY
Date of Admission: 2022  9:33 AM      Date of Discharge:  2022    Admitting Service: [facility]    Admission Diagnosis: shortened cervix at 14 weeks and VB  Discharge Diagnosis: shortened cervix at 14 weeks and VB      Presenting Problem/History of Present Illness  Threatened  [O20.0]       Hospital Course  Patient is a 31 y.o. female  who was 14.2 weeks today.  She was admitted yesterday from the office due to vaginal bleeding and a shortened cervix.  Patient was initially seen for vaginal bleeding on 7/15/2022.  At that time an ultrasound revealed positive fetal cardiac activity and a cervical length of 3.2 cm.  Patient followed up yesterday in the office and was noted to be bleeding moderately heavy and cramping a lot.  A repeat ultrasound revealed a viable IUP with positive fetal cardiac activity.  The cervical length is shortened to 1.2 cm and the fetal head was noted to be in the lower uterine segment.  The cervix was noted to be firm and closed but because of the heavy bleeding and the decrease in size a cervical length the patient was admitted for observation.  Today patient reports that she is having scant to no bleeding.  Her cramping is significantly improved.  Repeat transvaginal ultrasound was performed revealing again positive fetal cardiac activity and a cervical length measuring 1.37 cm.  Dr. Cotto at [facility] with the [facility] department was called and I discussed the patient's situation with her.  Dr. Cotto reviewed the ultrasound findings and recommend that she follow-up with [facility] in 1 week.  Patient may be a candidate for cerclage if she has no further vaginal bleeding and the pregnancy progresses.  The plan was reviewed with the patient and she was instructed to call the [facility] office to schedule his appoint for for next week so that they could recommend further care after another ultrasound in a week.  Patient was instructed to be on pelvic rest at home and to call  the office with any heavy vaginal bleeding.  Patient was discharged home today in stable condition.    Procedures Performed         Consults:   Consults     No orders found for last 30 day(s).            Condition on Discharge:  Stable/improved    Vital Signs  Temp:  [98.2 °F (36.8 °C)-99.2 °F (37.3 °C)] 99.1 °F (37.3 °C)  Heart Rate:  [65-80] 65  Resp:  [16] 16  BP: ()/(54-66) 98/54    Physical Exam:  Gen: NAD    Discharge Disposition  Home or Self Care    Discharge Medications     Discharge Medications      Continue These Medications      Instructions Start Date   ferrous gluconate 324 MG tablet  Commonly known as: FERGON   324 mg, Oral, 2 Times Daily      glucose blood test strip   Check blood sugars 4 times daily      glucose monitor monitoring kit   Check blood sugar 4 times daily      Lancets misc   Check blood sugar 4 times daily      nicotine 14 MG/24HR patch  Commonly known as: Nicoderm CQ   1 patch, Transdermal, Every 24 Hours      omeprazole 20 MG capsule  Commonly known as: priLOSEC   20 mg, Oral, Every Morning         Stop These Medications    ferrous sulfate 325 (65 FE) MG tablet            Discharge Diet: regular    Activity at Discharge:   Activity Instructions     Activity as Tolerated      Pelvic Rest            Follow-up Appointments  Future Appointments   Date Time Provider Department Center   7/27/2022  9:15 AM Delmar Kim MD [facility]   9/21/2022  8:30 AM LAB CHAIR 1 ABRAHAM PRYOR  [facility]   9/21/2022  9:00 AM Pete Matos MD [facility]     Additional Instructions for the Follow-ups that You Need to Schedule     Discharge Follow-up with Specialty: LISA Cotto [phone]; 1 Week   As directed      Specialty: LISA Cotto [phone]    Follow Up: 1 Week    Follow Up Details: Call us with heavy bleeding or severe cramping               Test Results Pending at Discharge       America Hernandez DO  07/20/22  13:59 EDT

## 2022-07-22 ENCOUNTER — HOSPITAL ENCOUNTER (OUTPATIENT)
Facility: HOSPITAL | Age: 32
Setting detail: OBSERVATION
Discharge: HOME | End: 2022-07-23
Attending: EMERGENCY MEDICINE | Admitting: OBSTETRICS & GYNECOLOGY

## 2022-07-22 DIAGNOSIS — O46.90 VAGINAL BLEEDING IN PREGNANCY: Primary | ICD-10-CM

## 2022-07-22 LAB
BASOPHILS # BLD AUTO: 0.04 10*3/MM3 (ref 0–0.2)
BASOPHILS NFR BLD AUTO: 0.3 % (ref 0–1.5)
DEPRECATED RDW RBC AUTO: 54.2 FL (ref 37–54)
EOSINOPHIL # BLD AUTO: 0.05 10*3/MM3 (ref 0–0.4)
EOSINOPHIL NFR BLD AUTO: 0.3 % (ref 0.3–6.2)
ERYTHROCYTE [DISTWIDTH] IN BLOOD BY AUTOMATED COUNT: 19 % (ref 12.3–15.4)
HCT VFR BLD AUTO: 31.8 % (ref 34–46.6)
HGB BLD-MCNC: 9.8 G/DL (ref 12–15.9)
IMM GRANULOCYTES # BLD AUTO: 0.14 10*3/MM3 (ref 0–0.05)
IMM GRANULOCYTES NFR BLD AUTO: 1 % (ref 0–0.5)
LYMPHOCYTES # BLD AUTO: 1.34 10*3/MM3 (ref 0.7–3.1)
LYMPHOCYTES NFR BLD AUTO: 9.1 % (ref 19.6–45.3)
MCH RBC QN AUTO: 24 PG (ref 26.6–33)
MCHC RBC AUTO-ENTMCNC: 30.8 G/DL (ref 31.5–35.7)
MCV RBC AUTO: 77.9 FL (ref 79–97)
MONOCYTES # BLD AUTO: 0.8 10*3/MM3 (ref 0.1–0.9)
MONOCYTES NFR BLD AUTO: 5.4 % (ref 5–12)
NEUTROPHILS NFR BLD AUTO: 12.36 10*3/MM3 (ref 1.7–7)
NEUTROPHILS NFR BLD AUTO: 83.9 % (ref 42.7–76)
NRBC BLD AUTO-RTO: 0 /100 WBC (ref 0–0.2)
PLATELET # BLD AUTO: 295 10*3/MM3 (ref 140–450)
PMV BLD AUTO: 9.9 FL (ref 6–12)
RBC # BLD AUTO: 4.08 10*6/MM3 (ref 3.77–5.28)
WBC NRBC COR # BLD: 14.73 10*3/MM3 (ref 3.4–10.8)

## 2022-07-22 PROCEDURE — G0378 HOSPITAL OBSERVATION PER HR: HCPCS

## 2022-07-22 PROCEDURE — 99284 EMERGENCY DEPT VISIT MOD MDM: CPT

## 2022-07-22 PROCEDURE — 96361 HYDRATE IV INFUSION ADD-ON: CPT

## 2022-07-22 PROCEDURE — 85025 COMPLETE CBC W/AUTO DIFF WBC: CPT | Performed by: EMERGENCY MEDICINE

## 2022-07-22 PROCEDURE — 84702 CHORIONIC GONADOTROPIN TEST: CPT | Performed by: EMERGENCY MEDICINE

## 2022-07-23 ENCOUNTER — APPOINTMENT (OUTPATIENT)
Dept: ULTRASOUND IMAGING | Facility: HOSPITAL | Age: 32
End: 2022-07-23

## 2022-07-23 VITALS
SYSTOLIC BLOOD PRESSURE: 123 MMHG | HEART RATE: 59 BPM | RESPIRATION RATE: 17 BRPM | OXYGEN SATURATION: 98 % | BODY MASS INDEX: 27.49 KG/M2 | WEIGHT: 165 LBS | TEMPERATURE: 98.4 F | HEIGHT: 65 IN | DIASTOLIC BLOOD PRESSURE: 59 MMHG

## 2022-07-23 PROBLEM — O46.90 VAGINAL BLEEDING IN PREGNANCY: Status: ACTIVE | Noted: 2022-07-23

## 2022-07-23 LAB
BASOPHILS # BLD AUTO: 0.03 10*3/MM3 (ref 0–0.2)
BASOPHILS NFR BLD AUTO: 0.2 % (ref 0–1.5)
DEPRECATED RDW RBC AUTO: 52.8 FL (ref 37–54)
EOSINOPHIL # BLD AUTO: 0.02 10*3/MM3 (ref 0–0.4)
EOSINOPHIL NFR BLD AUTO: 0.1 % (ref 0.3–6.2)
ERYTHROCYTE [DISTWIDTH] IN BLOOD BY AUTOMATED COUNT: 19 % (ref 12.3–15.4)
HCG INTACT+B SERPL-ACNC: NORMAL MIU/ML
HCG INTACT+B SERPL-ACNC: NORMAL MIU/ML
HCT VFR BLD AUTO: 27.1 % (ref 34–46.6)
HGB BLD-MCNC: 8.4 G/DL (ref 12–15.9)
IMM GRANULOCYTES # BLD AUTO: 0.11 10*3/MM3 (ref 0–0.05)
IMM GRANULOCYTES NFR BLD AUTO: 0.7 % (ref 0–0.5)
LYMPHOCYTES # BLD AUTO: 1.46 10*3/MM3 (ref 0.7–3.1)
LYMPHOCYTES NFR BLD AUTO: 9.6 % (ref 19.6–45.3)
MCH RBC QN AUTO: 23.7 PG (ref 26.6–33)
MCHC RBC AUTO-ENTMCNC: 31 G/DL (ref 31.5–35.7)
MCV RBC AUTO: 76.3 FL (ref 79–97)
MONOCYTES # BLD AUTO: 0.46 10*3/MM3 (ref 0.1–0.9)
MONOCYTES NFR BLD AUTO: 3 % (ref 5–12)
NEUTROPHILS NFR BLD AUTO: 13.12 10*3/MM3 (ref 1.7–7)
NEUTROPHILS NFR BLD AUTO: 86.4 % (ref 42.7–76)
NRBC BLD AUTO-RTO: 0 /100 WBC (ref 0–0.2)
PLATELET # BLD AUTO: 272 10*3/MM3 (ref 140–450)
PMV BLD AUTO: 10.1 FL (ref 6–12)
RBC # BLD AUTO: 3.55 10*6/MM3 (ref 3.77–5.28)
SARS-COV-2 RNA PNL SPEC NAA+PROBE: NOT DETECTED
WBC NRBC COR # BLD: 15.2 10*3/MM3 (ref 3.4–10.8)

## 2022-07-23 PROCEDURE — 99217 PR OBSERVATION CARE DISCHARGE MANAGEMENT: CPT | Performed by: OBSTETRICS & GYNECOLOGY

## 2022-07-23 PROCEDURE — 25010000002 HYDROMORPHONE 1 MG/ML SOLUTION: Performed by: OBSTETRICS & GYNECOLOGY

## 2022-07-23 PROCEDURE — 96374 THER/PROPH/DIAG INJ IV PUSH: CPT

## 2022-07-23 PROCEDURE — 76805 OB US >/= 14 WKS SNGL FETUS: CPT

## 2022-07-23 PROCEDURE — 87635 SARS-COV-2 COVID-19 AMP PRB: CPT | Performed by: OBSTETRICS & GYNECOLOGY

## 2022-07-23 PROCEDURE — G0378 HOSPITAL OBSERVATION PER HR: HCPCS

## 2022-07-23 PROCEDURE — C9803 HOPD COVID-19 SPEC COLLECT: HCPCS

## 2022-07-23 PROCEDURE — 84702 CHORIONIC GONADOTROPIN TEST: CPT | Performed by: OBSTETRICS & GYNECOLOGY

## 2022-07-23 PROCEDURE — 85025 COMPLETE CBC W/AUTO DIFF WBC: CPT | Performed by: OBSTETRICS & GYNECOLOGY

## 2022-07-23 PROCEDURE — 96376 TX/PRO/DX INJ SAME DRUG ADON: CPT

## 2022-07-23 RX ORDER — SODIUM CHLORIDE, SODIUM LACTATE, POTASSIUM CHLORIDE, CALCIUM CHLORIDE 600; 310; 30; 20 MG/100ML; MG/100ML; MG/100ML; MG/100ML
125 INJECTION, SOLUTION INTRAVENOUS CONTINUOUS
Status: DISCONTINUED | OUTPATIENT
Start: 2022-07-23 | End: 2022-07-23 | Stop reason: HOSPADM

## 2022-07-23 RX ADMIN — HYDROMORPHONE HYDROCHLORIDE 1 MG: 1 INJECTION, SOLUTION INTRAMUSCULAR; INTRAVENOUS; SUBCUTANEOUS at 04:52

## 2022-07-23 RX ADMIN — HYDROMORPHONE HYDROCHLORIDE 1 MG: 1 INJECTION, SOLUTION INTRAMUSCULAR; INTRAVENOUS; SUBCUTANEOUS at 01:11

## 2022-07-23 RX ADMIN — SODIUM CHLORIDE, POTASSIUM CHLORIDE, SODIUM LACTATE AND CALCIUM CHLORIDE 125 ML/HR: 600; 310; 30; 20 INJECTION, SOLUTION INTRAVENOUS at 00:51

## 2022-07-23 RX ADMIN — SODIUM CHLORIDE, POTASSIUM CHLORIDE, SODIUM LACTATE AND CALCIUM CHLORIDE 125 ML/HR: 600; 310; 30; 20 INJECTION, SOLUTION INTRAVENOUS at 09:25

## 2022-07-27 ENCOUNTER — OFFICE VISIT (OUTPATIENT)
Dept: OBSTETRICS AND GYNECOLOGY | Facility: CLINIC | Age: 32
End: 2022-07-27

## 2022-07-27 VITALS
SYSTOLIC BLOOD PRESSURE: 120 MMHG | BODY MASS INDEX: 27.56 KG/M2 | DIASTOLIC BLOOD PRESSURE: 86 MMHG | WEIGHT: 165.4 LBS | HEIGHT: 65 IN

## 2022-07-27 DIAGNOSIS — F17.210 CIGARETTE SMOKER: ICD-10-CM

## 2022-07-27 DIAGNOSIS — D50.8 OTHER IRON DEFICIENCY ANEMIA: ICD-10-CM

## 2022-07-27 DIAGNOSIS — O46.90 VAGINAL BLEEDING IN PREGNANCY: Primary | ICD-10-CM

## 2022-07-27 DIAGNOSIS — O03.9 COMPLETE ABORTION: ICD-10-CM

## 2022-07-27 PROBLEM — L05.91 PILONIDAL CYST: Status: RESOLVED | Noted: 2020-07-06 | Resolved: 2022-07-27

## 2022-07-27 PROBLEM — O20.0 THREATENED ABORTION: Status: RESOLVED | Noted: 2022-05-25 | Resolved: 2022-07-27

## 2022-07-27 PROBLEM — R73.09 ELEVATED HEMOGLOBIN A1C: Status: RESOLVED | Noted: 2022-06-17 | Resolved: 2022-07-27

## 2022-07-27 PROBLEM — O09.299 HISTORY OF MISCARRIAGE, CURRENTLY PREGNANT: Status: RESOLVED | Noted: 2022-05-25 | Resolved: 2022-07-27

## 2022-07-27 LAB
BILIRUB BLD-MCNC: NEGATIVE MG/DL
CLARITY, POC: CLEAR
COLOR UR: YELLOW
GLUCOSE UR STRIP-MCNC: NEGATIVE MG/DL
KETONES UR QL: NEGATIVE
LEUKOCYTE EST, POC: NEGATIVE
NITRITE UR-MCNC: NEGATIVE MG/ML
PH UR: 5 [PH] (ref 5–8)
PROT UR STRIP-MCNC: NEGATIVE MG/DL
RBC # UR STRIP: ABNORMAL /UL
SP GR UR: 1 (ref 1–1.03)
UROBILINOGEN UR QL: NORMAL

## 2022-07-27 PROCEDURE — 99214 OFFICE O/P EST MOD 30 MIN: CPT | Performed by: OBSTETRICS & GYNECOLOGY

## 2022-07-27 PROCEDURE — 81002 URINALYSIS NONAUTO W/O SCOPE: CPT | Performed by: OBSTETRICS & GYNECOLOGY

## 2022-07-27 RX ORDER — BLOOD-GLUCOSE METER
EACH MISCELLANEOUS 4 TIMES DAILY
COMMUNITY
Start: 2022-07-11

## 2022-07-27 NOTE — PROGRESS NOTES
"EVALUATION AND MANAGEMENT ENCOUNTER    Cydney Soto  Patient new to examiner? No  New problem to examiner? Yes  Patient referred? No    -----------------------------------------------------HISTORY---------------------------------------------------    Chief Complaint:   Chief Complaint   Patient presents with   • Follow-up     US       HPI:  Cydney Soto is a 31 y.o.  with Patient's last menstrual period was 2022. here for f/u complete ab @ 14+weeks.      Pt went to the ER this past weekend for bleeding and passed fetus in the bathroom in the ER.  Pt was admitted overnight for obs and discharged.  Pt is Rh pos.      Pt denies:  Fever or heavy bleeding at the moment.  Bleeding is starting to taper off a little.  Pt still having some cramps.    Pt had bad experience in the Er which she related to me.     History of Present Illness     Cydney Soto  reports that she has been smoking cigarettes. She has a 15.00 pack-year smoking history. She has never used smokeless tobacco.. I have educated her on the risk to pregnancy    I advised her to quit and she is agreeable.          ROS:  Review of Systems   Constitutional: Negative.    HENT: Negative.    Eyes: Negative.    Respiratory: Negative.    Cardiovascular: Negative.    Gastrointestinal: Negative.    Endocrine: Negative.    Genitourinary: Positive for vaginal bleeding.   Musculoskeletal: Negative.    Skin: Negative.    Allergic/Immunologic: Negative.    Neurological: Negative.    Hematological: Negative.    Psychiatric/Behavioral: Negative.        Patient reports that she is not currently experiencing any symptoms of urinary incontinence.      noTESTED FOR CHLAMYDIA?  -----------------------------------------------PHYSICAL EXAM----------------------------------------------    Vital Signs: /86   Ht 165.1 cm (65\")   Wt 75 kg (165 lb 6.4 oz)   LMP 2022   Breastfeeding No   BMI 27.52 kg/m²    Flowsheet Rows    Flowsheet " "Row First Filed Value   Admission Height 165.1 cm (65\") Documented at 07/27/2022 0828   Admission Weight 75 kg (165 lb 6.4 oz) Documented at 07/27/2022 0828          Physical Exam  Vitals and nursing note reviewed.   Constitutional:       Appearance: She is well-developed.   HENT:      Head: Normocephalic and atraumatic.   Cardiovascular:      Rate and Rhythm: Normal rate.   Pulmonary:      Effort: Pulmonary effort is normal.   Abdominal:      General: There is no distension.      Palpations: Abdomen is soft. There is no mass.      Tenderness: There is no abdominal tenderness. There is no guarding.   Genitourinary:     Vagina: No vaginal discharge.   Musculoskeletal:         General: No tenderness or deformity. Normal range of motion.      Cervical back: Normal range of motion.   Skin:     General: Skin is warm and dry.      Coloration: Skin is not pale.      Findings: No erythema or rash.   Neurological:      Mental Status: She is alert and oriented to person, place, and time.   Psychiatric:         Behavior: Behavior normal.         Thought Content: Thought content normal.         Judgment: Judgment normal.         I saw the patient with a face mask, gloves and eye protection  The patient herself was masked.  Social distancing was observed as appropriate. All COVID precautions observed.     -----------------------------------------------MEDICAL DECISION MAKING-----------------------------        DATA Review & labs ordered:     1.   Lab Results (last 24 hours)     Procedure Component Value Units Date/Time    POC Urinalysis Dipstick [[id number]]  (Abnormal) Collected: 07/27/22 0928    Specimen: Urine Updated: 07/27/22 0929     Color Yellow     Clarity, UA Clear     Glucose, UA Negative mg/dL      Bilirubin Negative     Ketones, UA Negative     Specific Gravity  1.005     Blood, UA Trace     pH, Urine 5.0     Protein, POC Negative mg/dL      Urobilinogen, UA Normal     Leukocytes Negative     Nitrite, UA Negative    "     2.   Imaging Results (Last 24 Hours)     ** No results found for the last 24 hours. **        3.   ECG/EMG Results (most recent)     None              Diagnoses and all orders for this visit:    1. Vaginal bleeding in pregnancy (Primary)  -     POC Urinalysis Dipstick  -     HCG, B-subunit, Quantitative    2. Cigarette smoker    3. Other iron deficiency anemia    4. Complete     u/s: findings consistent with complete ab.    IMPRESSION/PROBLEM:      Complete ab. Rh pos.  2nd miscarriage    PLAN:     1. Expectant management  2. Serial HCGs  3. Instructions and precautions given.   4. Pt candidate for progesterone support next conception.  5. Advised to continue to take PNVs.   6. All questions answere.       Pt instructed to call for results of any testing done today or message us with questions.     RTO Return if symptoms worsen or fail to improve, for Recheck. .  Instructions and precautions given.     I spent 30+ minutes caring for Cydney on this date of service. This time includes time spent by me in the following activities: preparing for the visit, reviewing tests, obtaining and/or reviewing a separately obtained history, performing a medically appropriate examination and/or evaluation, counseling and educating the patient/family/caregiver, ordering medications, tests, or procedures, referring and communicating with other health care professionals, documenting information in the medical record, independently interpreting results and communicating that information with the patient/family/caregiver and care coordination    Delmar Kim MD  10:29 EDT  22

## 2022-07-28 LAB — HCG INTACT+B SERPL-ACNC: 1190 MIU/ML

## 2022-09-21 ENCOUNTER — APPOINTMENT (OUTPATIENT)
Dept: LAB | Facility: HOSPITAL | Age: 32
End: 2022-09-21

## 2023-05-03 ENCOUNTER — APPOINTMENT (OUTPATIENT)
Dept: ULTRASOUND IMAGING | Facility: HOSPITAL | Age: 33
End: 2023-05-03
Payer: COMMERCIAL

## 2023-05-03 ENCOUNTER — HOSPITAL ENCOUNTER (EMERGENCY)
Facility: HOSPITAL | Age: 33
Discharge: HOME | End: 2023-05-03
Attending: EMERGENCY MEDICINE
Payer: COMMERCIAL

## 2023-05-03 VITALS
SYSTOLIC BLOOD PRESSURE: 106 MMHG | BODY MASS INDEX: 27.49 KG/M2 | HEIGHT: 65 IN | OXYGEN SATURATION: 100 % | DIASTOLIC BLOOD PRESSURE: 60 MMHG | HEART RATE: 65 BPM | TEMPERATURE: 98.4 F | RESPIRATION RATE: 16 BRPM | WEIGHT: 165 LBS

## 2023-05-03 DIAGNOSIS — R10.9 ABDOMINAL PAIN DURING PREGNANCY IN FIRST TRIMESTER: Primary | ICD-10-CM

## 2023-05-03 DIAGNOSIS — O26.891 ABDOMINAL PAIN DURING PREGNANCY IN FIRST TRIMESTER: Primary | ICD-10-CM

## 2023-05-03 LAB
ALBUMIN SERPL-MCNC: 4.1 G/DL (ref 3.5–5.2)
ALBUMIN/GLOB SERPL: 2 G/DL
ALP SERPL-CCNC: 62 U/L (ref 39–117)
ALT SERPL W P-5'-P-CCNC: 10 U/L (ref 1–33)
ANION GAP SERPL CALCULATED.3IONS-SCNC: 8.4 MMOL/L (ref 5–15)
AST SERPL-CCNC: 10 U/L (ref 1–32)
BASOPHILS # BLD AUTO: 0.03 10*3/MM3 (ref 0–0.2)
BASOPHILS NFR BLD AUTO: 0.4 % (ref 0–1.5)
BILIRUB SERPL-MCNC: 0.6 MG/DL (ref 0–1.2)
BILIRUB UR QL STRIP: NEGATIVE
BUN SERPL-MCNC: 10 MG/DL (ref 6–20)
BUN/CREAT SERPL: 17.2 (ref 7–25)
CALCIUM SPEC-SCNC: 9 MG/DL (ref 8.6–10.5)
CHLORIDE SERPL-SCNC: 105 MMOL/L (ref 98–107)
CLARITY UR: CLEAR
CO2 SERPL-SCNC: 21.6 MMOL/L (ref 22–29)
COLOR UR: YELLOW
CREAT SERPL-MCNC: 0.58 MG/DL (ref 0.57–1)
DEPRECATED RDW RBC AUTO: 50.9 FL (ref 37–54)
EGFRCR SERPLBLD CKD-EPI 2021: 125 ML/MIN/1.73
EOSINOPHIL # BLD AUTO: 0.1 10*3/MM3 (ref 0–0.4)
EOSINOPHIL NFR BLD AUTO: 1.2 % (ref 0.3–6.2)
ERYTHROCYTE [DISTWIDTH] IN BLOOD BY AUTOMATED COUNT: 17.8 % (ref 12.3–15.4)
GLOBULIN UR ELPH-MCNC: 2.1 GM/DL
GLUCOSE SERPL-MCNC: 104 MG/DL (ref 65–99)
GLUCOSE UR STRIP-MCNC: NEGATIVE MG/DL
HCG INTACT+B SERPL-ACNC: NORMAL MIU/ML
HCG SERPL QL: POSITIVE
HCT VFR BLD AUTO: 33.7 % (ref 34–46.6)
HGB BLD-MCNC: 10.9 G/DL (ref 12–15.9)
HGB UR QL STRIP.AUTO: NEGATIVE
HOLD SPECIMEN: NORMAL
IMM GRANULOCYTES # BLD AUTO: 0.03 10*3/MM3 (ref 0–0.05)
IMM GRANULOCYTES NFR BLD AUTO: 0.4 % (ref 0–0.5)
KETONES UR QL STRIP: NEGATIVE
LEUKOCYTE ESTERASE UR QL STRIP.AUTO: NEGATIVE
LIPASE SERPL-CCNC: 21 U/L (ref 13–60)
LYMPHOCYTES # BLD AUTO: 1.41 10*3/MM3 (ref 0.7–3.1)
LYMPHOCYTES NFR BLD AUTO: 17 % (ref 19.6–45.3)
MCH RBC QN AUTO: 26.3 PG (ref 26.6–33)
MCHC RBC AUTO-ENTMCNC: 32.3 G/DL (ref 31.5–35.7)
MCV RBC AUTO: 81.4 FL (ref 79–97)
MONOCYTES # BLD AUTO: 0.63 10*3/MM3 (ref 0.1–0.9)
MONOCYTES NFR BLD AUTO: 7.6 % (ref 5–12)
NEUTROPHILS NFR BLD AUTO: 6.09 10*3/MM3 (ref 1.7–7)
NEUTROPHILS NFR BLD AUTO: 73.4 % (ref 42.7–76)
NITRITE UR QL STRIP: NEGATIVE
NRBC BLD AUTO-RTO: 0.1 /100 WBC (ref 0–0.2)
PH UR STRIP.AUTO: 5.5 [PH] (ref 5–8)
PLATELET # BLD AUTO: 249 10*3/MM3 (ref 140–450)
PMV BLD AUTO: 10.2 FL (ref 6–12)
POTASSIUM SERPL-SCNC: 4.1 MMOL/L (ref 3.5–5.2)
PROT SERPL-MCNC: 6.2 G/DL (ref 6–8.5)
PROT UR QL STRIP: NEGATIVE
RBC # BLD AUTO: 4.14 10*6/MM3 (ref 3.77–5.28)
SODIUM SERPL-SCNC: 135 MMOL/L (ref 136–145)
SP GR UR STRIP: >=1.03 (ref 1–1.03)
UROBILINOGEN UR QL STRIP: NORMAL
WBC NRBC COR # BLD: 8.29 10*3/MM3 (ref 3.4–10.8)
WHOLE BLOOD HOLD COAG: NORMAL
WHOLE BLOOD HOLD SPECIMEN: NORMAL

## 2023-05-03 PROCEDURE — 81003 URINALYSIS AUTO W/O SCOPE: CPT | Performed by: EMERGENCY MEDICINE

## 2023-05-03 PROCEDURE — 36415 COLL VENOUS BLD VENIPUNCTURE: CPT

## 2023-05-03 PROCEDURE — 83690 ASSAY OF LIPASE: CPT | Performed by: EMERGENCY MEDICINE

## 2023-05-03 PROCEDURE — 80053 COMPREHEN METABOLIC PANEL: CPT | Performed by: EMERGENCY MEDICINE

## 2023-05-03 PROCEDURE — 76817 TRANSVAGINAL US OBSTETRIC: CPT

## 2023-05-03 PROCEDURE — 85025 COMPLETE CBC W/AUTO DIFF WBC: CPT | Performed by: EMERGENCY MEDICINE

## 2023-05-03 PROCEDURE — 76815 OB US LIMITED FETUS(S): CPT

## 2023-05-03 PROCEDURE — 84702 CHORIONIC GONADOTROPIN TEST: CPT | Performed by: EMERGENCY MEDICINE

## 2023-05-03 PROCEDURE — 99283 EMERGENCY DEPT VISIT LOW MDM: CPT

## 2023-05-03 PROCEDURE — 84703 CHORIONIC GONADOTROPIN ASSAY: CPT | Performed by: EMERGENCY MEDICINE

## 2023-05-03 RX ORDER — SODIUM CHLORIDE 0.9 % (FLUSH) 0.9 %
10 SYRINGE (ML) INJECTION AS NEEDED
Status: DISCONTINUED | OUTPATIENT
Start: 2023-05-03 | End: 2023-05-04 | Stop reason: HOSPADM

## 2023-05-04 DIAGNOSIS — O09.891 HISTORY OF PRETERM DELIVERY, CURRENTLY PREGNANT IN FIRST TRIMESTER: Primary | ICD-10-CM

## 2023-05-04 RX ORDER — PROGESTERONE 200 MG/1
200 CAPSULE ORAL NIGHTLY
Qty: 30 CAPSULE | Refills: 5 | Status: SHIPPED | OUTPATIENT
Start: 2023-05-04

## 2023-05-04 NOTE — ED PROVIDER NOTES
EMERGENCY DEPARTMENT ENCOUNTER    Room Number:    Date seen:  2023  PCP: Jesús Baker MD  Historian: Patient      HPI:  Chief Complaint: Abdominal cramping, pregnant  A complete HPI/ROS/PMH/PSH/SH/FH are unobtainable due to: Nothing  Context: Cydney Soto is a 32 y.o. female who presents to the ED c/o lower abdominal cramping for the last 2 days.  She has not had any vaginal bleeding.  She is approximately 11 weeks pregnant.  She feels that something is not right.  She reports that she had a miscarriage in the second semester with her last pregnancy due to cervical shortening.            PAST MEDICAL HISTORY  Active Ambulatory Problems     Diagnosis Date Noted    Cigarette smoker 2022    Iron deficiency anemia 2022    Complete  2022     Resolved Ambulatory Problems     Diagnosis Date Noted    Pilonidal cyst 2020    Threatened  2022    History of miscarriage, currently pregnant 2022    Initial obstetric visit in first trimester 06/15/2022    Subchorionic hemorrhage in first trimester 06/15/2022    Maternal anemia in pregnancy, antepartum 06/15/2022    Elevated hemoglobin A1c: failed early 2hr GTT. Ref to diabetes education.  2022    Prenatal care in third trimester 2022    Vaginal bleeding in pregnancy 2022     Past Medical History:   Diagnosis Date    Anxiety     Bilateral bunions     Chronic mental illness     Depression     GERD (gastroesophageal reflux disease)     Migraine          PAST SURGICAL HISTORY  Past Surgical History:   Procedure Laterality Date    HAND LACERATION REPAIR      [age] YEAR OLD    PILONIDAL CYSTECTOMY N/A 2020    Procedure: PILONIDAL CYSTECTOMY;  Surgeon: Bk Martini MD;  Location: [facility];  Service: General;  Laterality: N/A;    TONSILLECTOMY      WISDOM TOOTH EXTRACTION           FAMILY HISTORY  Family History   Problem Relation Age of Onset    Depression Mother     Alcohol abuse Father      Learning disabilities Sister     Depression Maternal Grandmother     Heart disease Maternal Grandfather     Breast cancer Neg Hx     Ovarian cancer Neg Hx     Colon cancer Neg Hx     Deep vein thrombosis Neg Hx     Pulmonary embolism Neg Hx     Birth defects Neg Hx     Mental retardation Neg Hx          SOCIAL HISTORY  Social History     Socioeconomic History    Marital status:    Tobacco Use    Smoking status: Every Day     Packs/day: 1.00     Years: 15.00     Pack years: 15.00     Types: Cigarettes    Smokeless tobacco: Never   Substance and Sexual Activity    Alcohol use: Yes     Comment: monthly 1-2 DRINKS MAYBE MONTHLY    Drug use: No    Sexual activity: Yes     Partners: Male     Birth control/protection: None         ALLERGIES  Penicillins and Propranolol        REVIEW OF SYSTEMS  Review of Systems   Review of all 14 systems is negative other than stated in the HPI above.      PHYSICAL EXAM  ED Triage Vitals   Temp Heart Rate Resp BP SpO2   05/03/23 1842 05/03/23 1841 05/03/23 1841 05/03/23 1844 05/03/23 1841   98.4 °F (36.9 °C) 89 16 104/60 98 %      Temp src Heart Rate Source Patient Position BP Location FiO2 (%)   -- -- -- -- --              Physical Exam      GENERAL: Awake and alert, no acute distress  HENT: nares patent  EYES: no scleral icterus  CV: regular rhythm, normal rate  RESPIRATORY: normal effort  ABDOMEN: soft, nontender  MUSCULOSKELETAL: no deformity  NEURO: alert, moves all extremities, follows commands  PSYCH:  calm, cooperative  SKIN: warm, dry    Vital signs and nursing notes reviewed.          LAB RESULTS  Recent Results (from the past 24 hour(s))   Comprehensive Metabolic Panel    Collection Time: 05/03/23  7:51 PM    Specimen: Arm, Right; Blood   Result Value Ref Range    Glucose 104 (H) 65 - 99 mg/dL    BUN 10 6 - 20 mg/dL    Creatinine 0.58 0.57 - 1.00 mg/dL    Sodium 135 (L) 136 - 145 mmol/L    Potassium 4.1 3.5 - 5.2 mmol/L    Chloride 105 98 - 107 mmol/L    CO2 21.6 (L)  22.0 - 29.0 mmol/L    Calcium 9.0 8.6 - 10.5 mg/dL    Total Protein 6.2 6.0 - 8.5 g/dL    Albumin 4.1 3.5 - 5.2 g/dL    ALT (SGPT) 10 1 - 33 U/L    AST (SGOT) 10 1 - 32 U/L    Alkaline Phosphatase 62 39 - 117 U/L    Total Bilirubin 0.6 0.0 - 1.2 mg/dL    Globulin 2.1 gm/dL    A/G Ratio 2.0 g/dL    BUN/Creatinine Ratio 17.2 7.0 - 25.0    Anion Gap 8.4 5.0 - 15.0 mmol/L    eGFR 125.0 >60.0 mL/min/1.73   Lipase    Collection Time: 05/03/23  7:51 PM    Specimen: Arm, Right; Blood   Result Value Ref Range    Lipase 21 13 - 60 U/L   hCG, Serum, Qualitative    Collection Time: 05/03/23  7:51 PM    Specimen: Arm, Right; Blood   Result Value Ref Range    HCG Qualitative Positive (A) Negative   Green Top (Gel)    Collection Time: 05/03/23  7:51 PM   Result Value Ref Range    Extra Tube Hold for add-ons.    Lavender Top    Collection Time: 05/03/23  7:51 PM   Result Value Ref Range    Extra Tube hold for add-on    Light Blue Top    Collection Time: 05/03/23  7:51 PM   Result Value Ref Range    Extra Tube Hold for add-ons.    CBC Auto Differential    Collection Time: 05/03/23  7:51 PM    Specimen: Arm, Right; Blood   Result Value Ref Range    WBC 8.29 3.40 - 10.80 10*3/mm3    RBC 4.14 3.77 - 5.28 10*6/mm3    Hemoglobin 10.9 (L) 12.0 - 15.9 g/dL    Hematocrit 33.7 (L) 34.0 - 46.6 %    MCV 81.4 79.0 - 97.0 fL    MCH 26.3 (L) 26.6 - 33.0 pg    MCHC 32.3 31.5 - 35.7 g/dL    RDW 17.8 (H) 12.3 - 15.4 %    RDW-SD 50.9 37.0 - 54.0 fl    MPV 10.2 6.0 - 12.0 fL    Platelets 249 140 - 450 10*3/mm3    Neutrophil % 73.4 42.7 - 76.0 %    Lymphocyte % 17.0 (L) 19.6 - 45.3 %    Monocyte % 7.6 5.0 - 12.0 %    Eosinophil % 1.2 0.3 - 6.2 %    Basophil % 0.4 0.0 - 1.5 %    Immature Grans % 0.4 0.0 - 0.5 %    Neutrophils, Absolute 6.09 1.70 - 7.00 10*3/mm3    Lymphocytes, Absolute 1.41 0.70 - 3.10 10*3/mm3    Monocytes, Absolute 0.63 0.10 - 0.90 10*3/mm3    Eosinophils, Absolute 0.10 0.00 - 0.40 10*3/mm3    Basophils, Absolute 0.03 0.00 - 0.20  10*3/mm3    Immature Grans, Absolute 0.03 0.00 - 0.05 10*3/mm3    nRBC 0.1 0.0 - 0.2 /100 WBC   hCG, Quantitative, Pregnancy    Collection Time: 05/03/23  7:51 PM    Specimen: Arm, Right; Blood   Result Value Ref Range    HCG Quantitative 47,837.00 mIU/mL   Urinalysis With Microscopic If Indicated (No Culture) - Urine, Clean Catch    Collection Time: 05/03/23  7:52 PM    Specimen: Urine, Clean Catch   Result Value Ref Range    Color, UA Yellow Yellow, Straw    Appearance, UA Clear Clear    pH, UA 5.5 5.0 - 8.0    Specific Gravity, UA >=1.030 1.005 - 1.030    Glucose, UA Negative Negative    Ketones, UA Negative Negative    Bilirubin, UA Negative Negative    Blood, UA Negative Negative    Protein, UA Negative Negative    Leuk Esterase, UA Negative Negative    Nitrite, UA Negative Negative    Urobilinogen, UA 0.2 E.U./dL 0.2 - 1.0 E.U./dL       Ordered the above labs and reviewed the results.        RADIOLOGY  US Ob Transvaginal    Result Date: 5/3/2023  PELVIC ULTRASOUND  HISTORY: Lower abdominal pain. Patient is pregnant.  COMPARISON: None available.  TECHNIQUE: Grayscale, color Doppler, spectral Doppler waveform analysis was performed through the pelvis transabdominally and transvaginally.  FINDINGS: The uterus measures 8.4 x 6.2 x 6.5 cm. There is a gestational sac identified within the endometrial canal. While yolk sac is seen, no fetal pole is identified. There is a mixed hyperechoic and cystic structure which is intimately associated with the gestational sac, and indents its margin. Its exact etiology is uncertain. It may represent some trace hemorrhage around the gestational sac. No color Doppler flow is noted within it. The patient also is noted to have a small amount of hyperechoic material within the cervix, which may represent some hemorrhage. The right ovary measures 2.7 x 1.9 x 2.0 cm. Left ovary measures 2.8 x 2.3 x 2.0 cm. There is color Doppler flow is noted within the ovaries. Suspected corpus luteum  is identified on the left ovary, measuring 1.5 x 1.2 x 1.2 cm. Trace left periovarian free fluid is noted.       1. The patient is noted to have an intrauterine pregnancy. While a yolk sac is identified, no fetal pole is seen. This may be related to very early pregnancy, but gestational age by dates is 5 weeks and 5 days, for an estimated date of delivery of 12/29/2023, an estimated gestational age by measurements is 6 weeks and 4 days, for an estimated date of delivery of 12/23/2023. 1 would expect to see a fetal pole at this point. However, the patient is noted to have some mixed hypoechoic and hyperechoic material adjacent to the gestational sac, which is resulting in some deformity of the contour of the gestational sac. Theoretically, this may represent some perigestational sac hemorrhage. Short-term obstetric and sonographic follow-up is recommended. 2. There is some hyperechoic material identified within the cervix. This could reflect some clot. Again, short-term follow-up is recommended.  This report was finalized on 5/3/2023 9:48 PM by Dr. Екатерина Vasques M.D.       Ordered the above noted radiological studies. Reviewed by me in PACS.            PROCEDURES  Procedures            MEDICATIONS GIVEN IN ER  Medications   sodium chloride 0.9 % flush 10 mL (has no administration in time range)                   MEDICAL DECISION MAKING, PROGRESS, and CONSULTS    All labs have been independently reviewed by me.  All radiology studies have been reviewed by me and I have also reviewed the radiology report.   EKG's independently viewed and interpreted by me.  Discussion below represents my analysis of pertinent findings related to patient's condition, differential diagnosis, treatment plan and final disposition.      Additional sources:  - Discussed/ obtained information from independent historians: N/A      Orders placed during this visit:  Orders Placed This Encounter   Procedures    US Ob Transvaginal    US Ob  Limited 1 + Fetuses    [place] Draw    Comprehensive Metabolic Panel    Lipase    Urinalysis With Microscopic If Indicated (No Culture) - Urine, Clean Catch    hCG, Serum, Qualitative    CBC Auto Differential    hCG, Quantitative, Pregnancy    NPO Diet NPO Type: Strict NPO    Undress & Gown    Insert Peripheral IV    CBC & Differential    Green Top (Gel)    Lavender Top    Light Blue Top           Differential diagnosis includes but is not limited to:    Missed AB  Subchorionic hemorrhage  Round ligament pain        Independent interpretation of labs, radiology studies, and discussions with consultants:  ED Course as of 05/04/23 0008   Wed May 03, 2023   2206 HCG Quantitative: 47,837.00 [JR]   2207 Ultrasound demonstrates gestational sac without visualization of fetal pole.  This may be due to early pregnancy.  There is also some question of small amount of hemorrhage around the gestational sac.  Patient will need close follow-up with OB/GYN. [JR]      ED Course User Index  [JR] Lyle Macdonald MD                 DIAGNOSIS  Final diagnoses:   Abdominal pain during pregnancy in first trimester         DISPOSITION  DISCHARGE    Patient discharged in stable condition.    Reviewed implications of results, diagnosis, meds, responsibility to follow up, warning signs and symptoms of possible worsening, potential complications and reasons to return to ER.    Patient/Family voiced understanding of above instructions.    Discussed plan for discharge, as there is no emergent indication for admission. Patient referred to primary care provider for BP management due to today's BP. Pt/family is agreeable and understands need for follow up and repeat testing.  Pt is aware that discharge does not mean that nothing is wrong but it indicates no emergency is present that requires admission and they must continue care with follow-up as given below or physician of their choice.     FOLLOW-UP  Angelina Pendleton MD  [id number]  [address] [address]  [phone]    Schedule an appointment as soon as possible for a visit            Medication List      No changes were made to your prescriptions during this visit.                   Latest Documented Vital Signs:  As of 00:08 EDT  BP- 106/60 HR- 65 Temp- 98.4 °F (36.9 °C) O2 sat- 100%              --    Please note that portions of this were completed with a voice recognition program.       Note Disclaimer: At [facility], we believe that sharing information builds trust and better relationships. You are receiving this note because you are receiving care at [facility] or recently visited. It is possible you will see health information before a provider has talked with you about it. This kind of information can be easy to misunderstand. To help you fully understand what it means for your health, we urge you to discuss this note with your provider.             Lyle Macdonald MD  05/04/23 0014

## 2023-05-10 ENCOUNTER — TRANSCRIBE ORDERS (OUTPATIENT)
Dept: ULTRASOUND IMAGING | Facility: HOSPITAL | Age: 33
End: 2023-05-10
Payer: COMMERCIAL

## 2023-05-10 DIAGNOSIS — Z87.59 HISTORY OF MISCARRIAGE: Primary | ICD-10-CM

## 2023-06-08 ENCOUNTER — LAB (OUTPATIENT)
Dept: LAB | Facility: HOSPITAL | Age: 33
End: 2023-06-08
Payer: COMMERCIAL

## 2023-06-08 ENCOUNTER — OFFICE VISIT (OUTPATIENT)
Dept: OBSTETRICS AND GYNECOLOGY | Facility: CLINIC | Age: 33
End: 2023-06-08
Payer: COMMERCIAL

## 2023-06-08 ENCOUNTER — HOSPITAL ENCOUNTER (OUTPATIENT)
Dept: ULTRASOUND IMAGING | Facility: HOSPITAL | Age: 33
Discharge: HOME | End: 2023-06-08
Payer: COMMERCIAL

## 2023-06-08 VITALS
SYSTOLIC BLOOD PRESSURE: 102 MMHG | RESPIRATION RATE: 16 BRPM | BODY MASS INDEX: 24.34 KG/M2 | HEART RATE: 77 BPM | TEMPERATURE: 98 F | WEIGHT: 142.6 LBS | HEIGHT: 64 IN | DIASTOLIC BLOOD PRESSURE: 58 MMHG

## 2023-06-08 DIAGNOSIS — O26.879 SHORT CERVIX AFFECTING PREGNANCY: ICD-10-CM

## 2023-06-08 DIAGNOSIS — O09.891 HISTORY OF PRETERM DELIVERY, CURRENTLY PREGNANT IN FIRST TRIMESTER: Primary | ICD-10-CM

## 2023-06-08 PROCEDURE — 76817 TRANSVAGINAL US OBSTETRIC: CPT

## 2023-06-08 PROCEDURE — 76801 OB US < 14 WKS SINGLE FETUS: CPT

## 2023-06-08 RX ORDER — PRENATAL VIT NO.126/IRON/FOLIC 28MG-0.8MG
TABLET ORAL DAILY
COMMUNITY

## 2023-06-08 NOTE — PROGRESS NOTES
Pt reports that she is doing well and denies vaginal bleeding, cramping, contractions or LOF at this time. Reviewed when to call OB office or present to L&D for evaluation with symptoms such as decreased fetal movement, vaginal bleeding, LOF or ctxs. Pt verbalized understanding. Denies HA, visual changes or epigastric pain. Denies any additional complaints at time of appointment. Next OB appointment scheduled for 06/14    Vitals:    06/08/23 0855   BP: 102/58   Pulse: 77   Resp: 16   Temp: 98 °F (36.7 °C)

## 2023-06-08 NOTE — LETTER
June 8, 2023     Patient: Cydney Soto   YOB: 1990   Date of Visit: 6/8/2023       To Whom It May Concern:    It is my medical opinion that Cydney Soto should be on light duty.  This includes not lifting more than 15 lbs, being able to sit down whenever necessary, and not being on her feet for more than 2 hours at a time.  She needs to be able to have a water bottle with her at all times.  Please feel free to reach out if any issues.          Sincerely,        Nicolle Colorado MD    CC:   No Recipients

## 2023-06-08 NOTE — LETTER
June 8, 2023     Angelina Pendleton MD  [address]  [facility] [address]  [facility] [address]    Patient: Cydney Soto   YOB: 1990   Date of Visit: 6/8/2023       Dear Angelina Pendleton MD,    Thank you for referring Cydney Soto to me for evaluation. Below is a copy of my consult note.    If you have questions, please do not hesitate to call me. I look forward to following Cydney along with you.         Sincerely,        Nicolle Colorado MD        CC: No Recipients    Pt reports that she is doing well and denies vaginal bleeding, cramping, contractions or LOF at this time. Reviewed when to call OB office or present to L&D for evaluation with symptoms such as decreased fetal movement, vaginal bleeding, LOF or ctxs. Pt verbalized understanding. Denies HA, visual changes or epigastric pain. Denies any additional complaints at time of appointment. Next OB appointment scheduled for 06/14    Vitals:    06/08/23 0855   BP: 102/58   Pulse: 77   Resp: 16   Temp: 98 °F (36.7 °C)

## 2023-06-08 NOTE — PROGRESS NOTES
MATERNAL FETAL MEDICINE Consult Note    Dear Dr Sara Martinez MD:    Thank you for your kind referral of Cydney Soto.  As you know, she is a 32 y.o.   at  11 0/7 weeks gestation (Estimated Date of Delivery: 23). This is a consult.      Her antepartum course is complicated by:  Hx of 14-15 week loss due to suspected cervical insufficiency    Aneuploidy Screening: got today    HPI: Today, she denies headache, blurry vision, RUQ pain. No vaginal bleeding, no contractions.     Review of History:  Past Medical History:   Diagnosis Date    Anxiety     Bilateral bunions     Chronic mental illness     Depression     GERD (gastroesophageal reflux disease)     Migraine     Pilonidal cyst     SCHEDULED FOR SX     Past Surgical History:   Procedure Laterality Date    HAND LACERATION REPAIR      [age] YEAR OLD    PILONIDAL CYSTECTOMY N/A 2020    Procedure: PILONIDAL CYSTECTOMY;  Surgeon: Bk Martini MD;  Location: [facility];  Service: General;  Laterality: N/A;    TONSILLECTOMY      WISDOM TOOTH EXTRACTION       Social History     Socioeconomic History    Marital status:    Tobacco Use    Smoking status: Every Day     Packs/day: 1.00     Years: 15.00     Pack years: 15.00     Types: Cigarettes     Passive exposure: Never    Smokeless tobacco: Never   Vaping Use    Vaping Use: Every day    Substances: Nicotine, Flavoring    Devices: Disposable   Substance and Sexual Activity    Alcohol use: Not Currently     Comment: monthly 1-2 DRINKS MAYBE MONTHLY    Drug use: No    Sexual activity: Yes     Partners: Male     Birth control/protection: None     Family History   Problem Relation Age of Onset    Depression Mother     Alcohol abuse Father     Learning disabilities Sister     Depression Maternal Grandmother     Heart disease Maternal Grandfather     Breast cancer Neg Hx     Ovarian cancer Neg Hx     Colon cancer Neg Hx     Deep vein thrombosis Neg Hx     Pulmonary embolism Neg Hx     Birth  "defects Neg Hx     Mental retardation Neg Hx       Allergies   Allergen Reactions    Penicillins Other (See Comments)     UNSURE OF REACTION    Propranolol Anaphylaxis      Current Outpatient Medications on File Prior to Visit   Medication Sig Dispense Refill    Blood Glucose Monitoring Suppl (Accu-Chek Guide Me) w/Device kit 4 (Four) Times a Day. use as directed      ferrous gluconate (FERGON) 324 MG tablet Take 1 tablet by mouth 2 (Two) Times a Day. 60 tablet 2    glucose monitor monitoring kit Check blood sugar 4 times daily 1 each 1    Lancets misc Check blood sugar 4 times daily 120 each 6    nicotine (Nicoderm CQ) 14 MG/24HR patch Place 1 patch on the skin as directed by provider Daily. 30 each 3    prenatal vitamin (prenatal, CLASSIC, vitamin) tablet Take  by mouth Daily.      omeprazole (priLOSEC) 20 MG capsule Take 1 capsule by mouth Every Morning. (Patient not taking: Reported on 6/8/2023)      Progesterone (PROMETRIUM) 200 MG capsule Insert 1 capsule into the vagina Every Night. (Patient not taking: Reported on 6/8/2023) 30 capsule 5     No current facility-administered medications on file prior to visit.        Past obstetric, gynecological, medical, surgical, family and social history reviewed.  Relevant lab work and imaging reviewed.    Review of systems  Constitutional:  denies fever, chills, malaise.   ENT/Mouth:  denies sore throat, tinnitis  Eyes: denies vision changes/pain  CV:  denies chest pain  Respiratory:  denies cough/SOB  GI:  denies N/V, diarrhea, abdominal pain.    :   denies dysuria  Skin:  denies lesions or pruritis   Neuro:  denies weakness, focal neurologic symptoms    Vitals:    06/08/23 0855   BP: 102/58   BP Location: Right arm   Patient Position: Sitting   Pulse: 77   Resp: 16   Temp: 98 °F (36.7 °C)   TempSrc: Temporal   Weight: 64.7 kg (142 lb 9.6 oz)   Height: 162.6 cm (64\")       PHYSICAL EXAM   GENERAL: Not in acute distress, AAOx3, pleasant  CARDIO: regular rate and " rhythm  PULM: symmetric chest rise, speaking in complete sentences without difficulty  NEURO: awake, alert and oriented to person, place, and time  ABDOMINAL: No fundal tenderness, no rebound or guarding, gravid  EXTREMITIES: no bilateral lower extremity edema/tenderness  SKIN: Warm, well-perfused      ULTRASOUND   Please view full ultrasound note on Imaging tab in ViewPoint.  Live viable early pregnancy.    bm.   NT 1.3 mm, which is normal.   Cervical length 3.4 cm, which is normal.      ASSESSMENT/COUNSELIN y.o.   at  11 0/7 weeks gestation (Estimated Date of Delivery: 23).     -Pregnancy  [ X ] stable  [   ] improving [  ] worsening    Diagnoses and all orders for this visit:    1. History of  delivery, currently pregnant in first trimester (Primary)    2. Short cervix affecting pregnancy         Hx of midtrimester loss with short cervix:  Had short cervix of 1.8 cm prior to delivery and then vaginal bleeding at 14-15 weeks with subsequent delivery.    We discussed this history--that she has documented cervical insufficiency very early.  I discussed that serial CL are an option, but I think the best option is a history-indicated cerclage.    A history indicated cerclage has the benefit that it is usually placed in a long cervix with less risk of intraoperative complications, but the downfall may be that it may be placed unnecessarily as ultimately she may do very well in her next pregnancy. Placement once there is cervical shortening does increase the risk for intraoperative complications. Risk of cerclage include risk of bleeding, damage to surrounding structures, damage to cerclage with  labor, infection, PPROM, fetal loss. We discussed this in detail.  Given that this would be a prophylactic cerclage if put in soon, the risk of these is much less.  She does very much desire to do this.  We went through what the procedure entails, when to arrive on L&D (5:30AM and NPO  after MN) and that she will continue taking her vaginal progesterone, which she is already on.  She is amenable and all her questions were answered.  We did discuss cell free DNA screening and she desires that today, so it was sent.       We discussed post-cerclage precautions: pelvic rest, lifting precautions (no more than 15 lb), and no strenuous exercise.  Patient is amenable to this.       She was given cerclage handout.     Summary of Plan  -Cerclage 6/16 @ 0730.  -Continue vaginal progesterone--can stop for a few days surrounding cerclage  -Pt given work note for modified activity  -Post op appt made for 6/23    Follow-up: Follow up after cerclage    Thank you for the consult and opportunity to care for this patient.  Please feel free to reach out with any questions or concerns.      I spent 30 minutes caring for this patient on this date of service. This time includes time spent by me in the following activities: preparing for the visit, reviewing tests, obtaining and/or reviewing a separately obtained history, performing a medically appropriate examination and/or evaluation, counseling and educating the patient/family/caregiver and independently interpreting results and communicating that information with the patient/family/caregiver with greater than 50% spent in counseling and coordination of care.       I spent 5 minutes on the separately reported service of US imaging not included in the time used to support the E/M service also reported today.      Nicolle Colorado MD FACOG  Maternal Fetal Medicine-[facility]  Office: [phone]  [email or web address]

## 2023-06-08 NOTE — LETTER
June 8, 2023     Sara Martinez MD  [address]  [address]  [facility] [address]    Patient: Cydney Soto   YOB: 1990   Date of Visit: 6/8/2023       Dear Sara Martinez MD,    Thank you for referring Cydney Soto to me for evaluation. Below is a copy of my consult note.    If you have questions, please do not hesitate to call me. I look forward to following Cydney along with you.         Sincerely,        Nicolle Colorado MD        CC: No Recipients    Pt reports that she is doing well and denies vaginal bleeding, cramping, contractions or LOF at this time. Reviewed when to call OB office or present to L&D for evaluation with symptoms such as decreased fetal movement, vaginal bleeding, LOF or ctxs. Pt verbalized understanding. Denies HA, visual changes or epigastric pain. Denies any additional complaints at time of appointment. Next OB appointment scheduled for 06/14    Vitals:    06/08/23 0855   BP: 102/58   Pulse: 77   Resp: 16   Temp: 98 °F (36.7 °C)

## 2023-06-15 RX ORDER — HYDROMORPHONE HYDROCHLORIDE 1 MG/ML
0.5 INJECTION, SOLUTION INTRAMUSCULAR; INTRAVENOUS; SUBCUTANEOUS
Status: DISCONTINUED | OUTPATIENT
Start: 2023-06-15 | End: 2023-06-16 | Stop reason: HOSPADM

## 2023-06-15 RX ORDER — KETOROLAC TROMETHAMINE 30 MG/ML
30 INJECTION, SOLUTION INTRAMUSCULAR; INTRAVENOUS EVERY 6 HOURS PRN
Status: DISCONTINUED | OUTPATIENT
Start: 2023-06-16 | End: 2023-06-16 | Stop reason: HOSPADM

## 2023-06-15 RX ORDER — SODIUM CHLORIDE 9 MG/ML
40 INJECTION, SOLUTION INTRAVENOUS AS NEEDED
Status: DISCONTINUED | OUTPATIENT
Start: 2023-06-15 | End: 2023-06-16 | Stop reason: HOSPADM

## 2023-06-15 RX ORDER — ACETAMINOPHEN 500 MG
1000 TABLET ORAL ONCE
Status: COMPLETED | OUTPATIENT
Start: 2023-06-15 | End: 2023-06-16

## 2023-06-15 NOTE — H&P
MATERNAL FETAL MEDICINE Consult Note    Dear Dr Nicolle Colorado MD:    Thank you for your kind referral of Cydney Soto.  As you know, she is a 32 y.o.   at  11 0/7 weeks gestation (Estimated Date of Delivery: 23). This is a consult.      Her antepartum course is complicated by:  Hx of 14-15 week loss due to suspected cervical insufficiency    Aneuploidy Screening: got today    HPI: Today, she denies headache, blurry vision, RUQ pain. No vaginal bleeding, no contractions.     Review of History:  Past Medical History:   Diagnosis Date    Anxiety     Bilateral bunions     Chronic mental illness     Depression     GERD (gastroesophageal reflux disease)     Migraine     Pilonidal cyst     SCHEDULED FOR SX     Past Surgical History:   Procedure Laterality Date    HAND LACERATION REPAIR      [age] YEAR OLD    PILONIDAL CYSTECTOMY N/A 2020    Procedure: PILONIDAL CYSTECTOMY;  Surgeon: Bk Martini MD;  Location: [facility];  Service: General;  Laterality: N/A;    TONSILLECTOMY      WISDOM TOOTH EXTRACTION       Social History     Socioeconomic History    Marital status:    Tobacco Use    Smoking status: Every Day     Packs/day: 1.00     Years: 15.00     Pack years: 15.00     Types: Cigarettes     Passive exposure: Never    Smokeless tobacco: Never   Vaping Use    Vaping Use: Every day    Substances: Nicotine, Flavoring    Devices: Disposable   Substance and Sexual Activity    Alcohol use: Not Currently     Comment: monthly 1-2 DRINKS MAYBE MONTHLY    Drug use: No    Sexual activity: Yes     Partners: Male     Birth control/protection: None     Family History   Problem Relation Age of Onset    Depression Mother     Alcohol abuse Father     Learning disabilities Sister     Depression Maternal Grandmother     Heart disease Maternal Grandfather     Breast cancer Neg Hx     Ovarian cancer Neg Hx     Colon cancer Neg Hx     Deep vein thrombosis Neg Hx     Pulmonary embolism Neg Hx     Birth  defects Neg Hx     Mental retardation Neg Hx       Allergies   Allergen Reactions    Penicillins Other (See Comments)     UNSURE OF REACTION    Propranolol Anaphylaxis      No current facility-administered medications on file prior to encounter.     Current Outpatient Medications on File Prior to Encounter   Medication Sig Dispense Refill    Blood Glucose Monitoring Suppl (Accu-Chek Guide Me) w/Device kit 4 (Four) Times a Day. use as directed      ferrous gluconate (FERGON) 324 MG tablet Take 1 tablet by mouth 2 (Two) Times a Day. 60 tablet 2    glucose monitor monitoring kit Check blood sugar 4 times daily 1 each 1    Lancets misc Check blood sugar 4 times daily 120 each 6    nicotine (Nicoderm CQ) 14 MG/24HR patch Place 1 patch on the skin as directed by provider Daily. 30 each 3    omeprazole (priLOSEC) 20 MG capsule Take 1 capsule by mouth Every Morning. (Patient not taking: Reported on 6/8/2023)      prenatal vitamin (prenatal, CLASSIC, vitamin) tablet Take  by mouth Daily.      Progesterone (PROMETRIUM) 200 MG capsule Insert 1 capsule into the vagina Every Night. (Patient not taking: Reported on 6/8/2023) 30 capsule 5        Past obstetric, gynecological, medical, surgical, family and social history reviewed.  Relevant lab work and imaging reviewed.    Review of systems  Constitutional:  denies fever, chills, malaise.   ENT/Mouth:  denies sore throat, tinnitis  Eyes: denies vision changes/pain  CV:  denies chest pain  Respiratory:  denies cough/SOB  GI:  denies N/V, diarrhea, abdominal pain.    :   denies dysuria  Skin:  denies lesions or pruritis   Neuro:  denies weakness, focal neurologic symptoms    There were no vitals filed for this visit.      PHYSICAL EXAM   GENERAL: Not in acute distress, AAOx3, pleasant  CARDIO: regular rate and rhythm  PULM: symmetric chest rise, speaking in complete sentences without difficulty  NEURO: awake, alert and oriented to person, place, and time  ABDOMINAL: No fundal  tenderness, no rebound or guarding, gravid  EXTREMITIES: no bilateral lower extremity edema/tenderness  SKIN: Warm, well-perfused      ULTRASOUND   Please view full ultrasound note on Imaging tab in ViewPoint.  Live viable early pregnancy.    bm.   NT 1.3 mm, which is normal.   Cervical length 3.4 cm, which is normal.      ASSESSMENT/COUNSELIN y.o.   at  11 0/7 weeks gestation (Estimated Date of Delivery: 23).     -Pregnancy  [ X ] stable  [   ] improving [  ] worsening    There are no diagnoses linked to this encounter.       Hx of midtrimester loss with short cervix:  Had short cervix of 1.8 cm prior to delivery and then vaginal bleeding at 14-15 weeks with subsequent delivery.    We discussed this history--that she has documented cervical insufficiency very early.  I discussed that serial CL are an option, but I think the best option is a history-indicated cerclage.    A history indicated cerclage has the benefit that it is usually placed in a long cervix with less risk of intraoperative complications, but the downfall may be that it may be placed unnecessarily as ultimately she may do very well in her next pregnancy. Placement once there is cervical shortening does increase the risk for intraoperative complications. Risk of cerclage include risk of bleeding, damage to surrounding structures, damage to cerclage with  labor, infection, PPROM, fetal loss. We discussed this in detail.  Given that this would be a prophylactic cerclage if put in soon, the risk of these is much less.  She does very much desire to do this.  We went through what the procedure entails, when to arrive on L&D (5:30AM and NPO after MN) and that she will continue taking her vaginal progesterone, which she is already on.  She is amenable and all her questions were answered.  We did discuss cell free DNA screening and she desires that today, so it was sent.       We discussed post-cerclage precautions: pelvic  rest, lifting precautions (no more than 15 lb), and no strenuous exercise.  Patient is amenable to this.       She was given cerclage handout.     Summary of Plan  -Cerclage 6/16 @ 0730.  -Continue vaginal progesterone--can stop for a few days surrounding cerclage  -Pt given work note for modified activity  -Post op appt made for 6/23    Follow-up: Follow up after cerclage    Thank you for the consult and opportunity to care for this patient.  Please feel free to reach out with any questions or concerns.      I spent 30 minutes caring for this patient on this date of service. This time includes time spent by me in the following activities: preparing for the visit, reviewing tests, obtaining and/or reviewing a separately obtained history, performing a medically appropriate examination and/or evaluation, counseling and educating the patient/family/caregiver and independently interpreting results and communicating that information with the patient/family/caregiver with greater than 50% spent in counseling and coordination of care.       I spent 5 minutes on the separately reported service of US imaging not included in the time used to support the E/M service also reported today.      Nicolle Colorado MD FACOG  Maternal Fetal Medicine-[facility]  Office: [phone]  [email or web address]

## 2023-06-16 ENCOUNTER — ANESTHESIA (OUTPATIENT)
Dept: LABOR AND DELIVERY | Facility: HOSPITAL | Age: 33
End: 2023-06-16
Payer: COMMERCIAL

## 2023-06-16 ENCOUNTER — HOSPITAL ENCOUNTER (OUTPATIENT)
Facility: HOSPITAL | Age: 33
Discharge: HOME | End: 2023-06-16
Attending: OBSTETRICS & GYNECOLOGY | Admitting: OBSTETRICS & GYNECOLOGY
Payer: COMMERCIAL

## 2023-06-16 ENCOUNTER — ANESTHESIA EVENT (OUTPATIENT)
Dept: LABOR AND DELIVERY | Facility: HOSPITAL | Age: 33
End: 2023-06-16
Payer: COMMERCIAL

## 2023-06-16 VITALS
RESPIRATION RATE: 16 BRPM | TEMPERATURE: 97.7 F | WEIGHT: 142.4 LBS | OXYGEN SATURATION: 100 % | BODY MASS INDEX: 24.44 KG/M2 | DIASTOLIC BLOOD PRESSURE: 48 MMHG | SYSTOLIC BLOOD PRESSURE: 104 MMHG | HEART RATE: 79 BPM

## 2023-06-16 PROBLEM — Z87.42 HISTORY OF CERVICAL INCOMPETENCE: Status: ACTIVE | Noted: 2023-06-16

## 2023-06-16 PROBLEM — Z34.90 PREGNANCY: Status: ACTIVE | Noted: 2023-06-16

## 2023-06-16 LAB
ABO GROUP BLD: NORMAL
BLD GP AB SCN SERPL QL: NEGATIVE
DEPRECATED RDW RBC AUTO: 54.5 FL (ref 37–54)
ERYTHROCYTE [DISTWIDTH] IN BLOOD BY AUTOMATED COUNT: 17.7 % (ref 12.3–15.4)
HCT VFR BLD AUTO: 30.7 % (ref 34–46.6)
HGB BLD-MCNC: 10 G/DL (ref 12–15.9)
MCH RBC QN AUTO: 27.8 PG (ref 26.6–33)
MCHC RBC AUTO-ENTMCNC: 32.6 G/DL (ref 31.5–35.7)
MCV RBC AUTO: 85.3 FL (ref 79–97)
PLATELET # BLD AUTO: 235 10*3/MM3 (ref 140–450)
PMV BLD AUTO: 9.8 FL (ref 6–12)
RBC # BLD AUTO: 3.6 10*6/MM3 (ref 3.77–5.28)
RH BLD: POSITIVE
T&S EXPIRATION DATE: NORMAL
WBC NRBC COR # BLD: 8.34 10*3/MM3 (ref 3.4–10.8)

## 2023-06-16 PROCEDURE — 85027 COMPLETE CBC AUTOMATED: CPT | Performed by: OBSTETRICS & GYNECOLOGY

## 2023-06-16 PROCEDURE — 25010000002 FENTANYL CITRATE (PF) 50 MCG/ML SOLUTION: Performed by: ANESTHESIOLOGY

## 2023-06-16 PROCEDURE — 25010000002 CEFAZOLIN IN DEXTROSE 2-4 GM/100ML-% SOLUTION: Performed by: OBSTETRICS & GYNECOLOGY

## 2023-06-16 PROCEDURE — 86900 BLOOD TYPING SEROLOGIC ABO: CPT | Performed by: OBSTETRICS & GYNECOLOGY

## 2023-06-16 PROCEDURE — 86901 BLOOD TYPING SEROLOGIC RH(D): CPT | Performed by: OBSTETRICS & GYNECOLOGY

## 2023-06-16 PROCEDURE — 25010000002 KETOROLAC TROMETHAMINE PER 15 MG: Performed by: OBSTETRICS & GYNECOLOGY

## 2023-06-16 PROCEDURE — 86850 RBC ANTIBODY SCREEN: CPT | Performed by: OBSTETRICS & GYNECOLOGY

## 2023-06-16 PROCEDURE — L3999 UPPER LIMB ORTHOSIS NOS: HCPCS | Performed by: OBSTETRICS & GYNECOLOGY

## 2023-06-16 RX ORDER — BUPIVACAINE HYDROCHLORIDE 7.5 MG/ML
INJECTION, SOLUTION EPIDURAL; RETROBULBAR
Status: COMPLETED | OUTPATIENT
Start: 2023-06-16 | End: 2023-06-16

## 2023-06-16 RX ORDER — FENTANYL CITRATE 50 UG/ML
25 INJECTION, SOLUTION INTRAMUSCULAR; INTRAVENOUS
Status: CANCELLED | OUTPATIENT
Start: 2023-06-16

## 2023-06-16 RX ORDER — DIPHENHYDRAMINE HYDROCHLORIDE 50 MG/ML
25 INJECTION INTRAMUSCULAR; INTRAVENOUS EVERY 4 HOURS PRN
Status: CANCELLED | OUTPATIENT
Start: 2023-06-16

## 2023-06-16 RX ORDER — CEFAZOLIN SODIUM 2 G/100ML
2 INJECTION, SOLUTION INTRAVENOUS ONCE
Status: COMPLETED | OUTPATIENT
Start: 2023-06-16 | End: 2023-06-16

## 2023-06-16 RX ORDER — SODIUM CHLORIDE 0.9 % (FLUSH) 0.9 %
10 SYRINGE (ML) INJECTION AS NEEDED
Status: DISCONTINUED | OUTPATIENT
Start: 2023-06-16 | End: 2023-06-16 | Stop reason: HOSPADM

## 2023-06-16 RX ORDER — CLINDAMYCIN PHOSPHATE 900 MG/50ML
900 INJECTION INTRAVENOUS ONCE
Status: DISCONTINUED | OUTPATIENT
Start: 2023-06-16 | End: 2023-06-16

## 2023-06-16 RX ORDER — SODIUM CHLORIDE 0.9 % (FLUSH) 0.9 %
10 SYRINGE (ML) INJECTION EVERY 12 HOURS SCHEDULED
Status: DISCONTINUED | OUTPATIENT
Start: 2023-06-16 | End: 2023-06-16 | Stop reason: HOSPADM

## 2023-06-16 RX ORDER — SODIUM CHLORIDE, SODIUM LACTATE, POTASSIUM CHLORIDE, CALCIUM CHLORIDE 600; 310; 30; 20 MG/100ML; MG/100ML; MG/100ML; MG/100ML
125 INJECTION, SOLUTION INTRAVENOUS CONTINUOUS
Status: DISCONTINUED | OUTPATIENT
Start: 2023-06-16 | End: 2023-06-16 | Stop reason: HOSPADM

## 2023-06-16 RX ORDER — FENTANYL CITRATE 50 UG/ML
INJECTION, SOLUTION INTRAMUSCULAR; INTRAVENOUS
Status: COMPLETED | OUTPATIENT
Start: 2023-06-16 | End: 2023-06-16

## 2023-06-16 RX ORDER — DIPHENHYDRAMINE HCL 25 MG
25 CAPSULE ORAL EVERY 4 HOURS PRN
Status: CANCELLED | OUTPATIENT
Start: 2023-06-16

## 2023-06-16 RX ORDER — PHENYLEPHRINE HCL IN 0.9% NACL 1 MG/10 ML
SYRINGE (ML) INTRAVENOUS AS NEEDED
Status: DISCONTINUED | OUTPATIENT
Start: 2023-06-16 | End: 2023-06-16 | Stop reason: SURG

## 2023-06-16 RX ORDER — FAMOTIDINE 10 MG/ML
20 INJECTION, SOLUTION INTRAVENOUS ONCE AS NEEDED
Status: COMPLETED | OUTPATIENT
Start: 2023-06-16 | End: 2023-06-16

## 2023-06-16 RX ORDER — ONDANSETRON 2 MG/ML
4 INJECTION INTRAMUSCULAR; INTRAVENOUS ONCE AS NEEDED
Status: CANCELLED | OUTPATIENT
Start: 2023-06-16

## 2023-06-16 RX ORDER — TRISODIUM CITRATE DIHYDRATE AND CITRIC ACID MONOHYDRATE 500; 334 MG/5ML; MG/5ML
30 SOLUTION ORAL ONCE
Status: COMPLETED | OUTPATIENT
Start: 2023-06-16 | End: 2023-06-16

## 2023-06-16 RX ORDER — NALOXONE HCL 0.4 MG/ML
0.2 VIAL (ML) INJECTION
Status: CANCELLED | OUTPATIENT
Start: 2023-06-16

## 2023-06-16 RX ADMIN — FAMOTIDINE 20 MG: 10 INJECTION INTRAVENOUS at 07:16

## 2023-06-16 RX ADMIN — SODIUM CHLORIDE, POTASSIUM CHLORIDE, SODIUM LACTATE AND CALCIUM CHLORIDE 125 ML/HR: 600; 310; 30; 20 INJECTION, SOLUTION INTRAVENOUS at 07:20

## 2023-06-16 RX ADMIN — BUPIVACAINE HYDROCHLORIDE 1 ML: 7.5 INJECTION, SOLUTION EPIDURAL; RETROBULBAR at 07:46

## 2023-06-16 RX ADMIN — Medication 50 MCG: at 07:47

## 2023-06-16 RX ADMIN — SODIUM CITRATE AND CITRIC ACID MONOHYDRATE 30 ML: 500; 334 SOLUTION ORAL at 07:32

## 2023-06-16 RX ADMIN — ACETAMINOPHEN 1000 MG: 500 TABLET ORAL at 07:18

## 2023-06-16 RX ADMIN — FENTANYL CITRATE 10 MCG: 50 INJECTION, SOLUTION INTRAMUSCULAR; INTRAVENOUS at 07:46

## 2023-06-16 RX ADMIN — CEFAZOLIN SODIUM 2 G: 2 INJECTION, SOLUTION INTRAVENOUS at 07:31

## 2023-06-16 RX ADMIN — SODIUM CHLORIDE, POTASSIUM CHLORIDE, SODIUM LACTATE AND CALCIUM CHLORIDE 500 ML: 600; 310; 30; 20 INJECTION, SOLUTION INTRAVENOUS at 06:15

## 2023-06-16 RX ADMIN — KETOROLAC TROMETHAMINE 30 MG: 30 INJECTION, SOLUTION INTRAMUSCULAR; INTRAVENOUS at 12:08

## 2023-06-16 NOTE — ANESTHESIA PREPROCEDURE EVALUATION
Anesthesia Evaluation     Patient summary reviewed and Nursing notes reviewed   NPO Solid Status: > 8 hours  NPO Liquid Status: > 2 hours           Airway   Mallampati: II  TM distance: >3 FB  Neck ROM: full  No difficulty expected  Dental      Pulmonary    Cardiovascular         Neuro/Psych  (+) headaches, psychiatric history Anxiety and Depression  GI/Hepatic/Renal/Endo    (+) GERD    Musculoskeletal     Abdominal    Substance History      OB/GYN    (+) Pregnant (11weeks)        Other        ROS/Med Hx Other: H/o 14-15 week loss  Cervical incompetence                 Anesthesia Plan    ASA 2     spinal     (I have reviewed the patient's history with the patient and the chart, including all pertinent laboratory results and imaging. I have explained the risks of spinal anesthesia including but not limited to hypotension, PDPH, nerve injury and risk of converting to GA. Patient understands risks and agrees to proceed.      Wt 64.6 kg (142 lb 6.4 oz)   BMI 24.44 kg/m²   )    Anesthetic plan, risks, benefits, and alternatives have been provided, discussed and informed consent has been obtained with: patient.    CODE STATUS:

## 2023-06-16 NOTE — ANESTHESIA POSTPROCEDURE EVALUATION
Patient: Cydney Soto    Procedure Summary       Date: 06/16/23 Room / Location:  [facility]    Anesthesia Start: 0736 Anesthesia Stop: 0828    Procedure: CERVICAL CERCLAGE (Cervix) Diagnosis:     Surgeons: Nicolle Colorado MD Provider: Mary Salazar MD    Anesthesia Type: spinal ASA Status: 2            Anesthesia Type: spinal    Vitals  Vitals Value Taken Time   /48 06/16/23 1100   Temp 36.5 °C (97.7 °F) 06/16/23 0830   Pulse 79 06/16/23 1100   Resp 16 06/16/23 1100   SpO2 100 % 06/16/23 0930           Post Anesthesia Care and Evaluation    Patient location during evaluation: bedside  Patient participation: complete - patient participated  Level of consciousness: awake and alert  Pain management: adequate    Airway patency: patent  Anesthetic complications: No anesthetic complications  PONV Status: controlled  Cardiovascular status: acceptable  Respiratory status: acceptable  Hydration status: acceptable

## 2023-06-16 NOTE — PLAN OF CARE
Problem: Adult Inpatient Plan of Care  Goal: Plan of Care Review  Outcome: Met  Flowsheets (Taken 6/16/2023 1239)  Progress: improving  Plan of Care Reviewed With:   patient   spouse  Outcome Evaluation: patient comfortable ambulkating, able to void, eating well.  Goal: Patient-Specific Goal (Individualized)  Outcome: Met  Goal: Absence of Hospital-Acquired Illness or Injury  Outcome: Met  Intervention: Identify and Manage Fall Risk  Recent Flowsheet Documentation  Taken 6/16/2023 0830 by Amanda Padilla RN  Safety Promotion/Fall Prevention: safety round/check completed  Taken 6/16/2023 0645 by Amanda Padilla RN  Safety Promotion/Fall Prevention: safety round/check completed  Goal: Optimal Comfort and Wellbeing  Outcome: Met  Goal: Readiness for Transition of Care  Outcome: Met  Intervention: Mutually Develop Transition Plan  Recent Flowsheet Documentation  Taken 6/16/2023 0911 by Amanda Padilla RN  Equipment Currently Used at Home: none  Taken 6/16/2023 0617 by Amanda Padilla RN  Transportation Anticipated: car, drives self  Patient/Family Anticipated Services at Transition: none  Patient/Family Anticipates Transition to: home     Problem: Device-Related Complication Risk (Anesthesia/Analgesia, Neuraxial)  Goal: Safe Infusion Delivery Completion  Outcome: Met     Problem: Infection (Anesthesia/Analgesia, Neuraxial)  Goal: Absence of Infection Signs and Symptoms  Outcome: Met     Problem: Nausea and Vomiting (Anesthesia/Analgesia, Neuraxial)  Goal: Nausea and Vomiting Relief  Outcome: Met     Problem: Pain (Anesthesia/Analgesia, Neuraxial)  Goal: Effective Pain Control  Outcome: Met     Problem: Respiratory Compromise (Anesthesia/Analgesia, Neuraxial)  Goal: Effective Oxygenation and Ventilation  Outcome: Met     Problem: Sensorimotor Impairment (Anesthesia/Analgesia, Neuraxial)  Goal: Baseline Motor Function  Outcome: Met  Intervention: Optimize Sensorimotor Function  Recent Flowsheet Documentation  Taken  6/16/2023 0830 by Amanda Padilla RN  Safety Promotion/Fall Prevention: safety round/check completed  Taken 6/16/2023 0645 by Amanda Padilla RN  Safety Promotion/Fall Prevention: safety round/check completed     Problem: Urinary Retention (Anesthesia/Analgesia, Neuraxial)  Goal: Effective Urinary Elimination  Outcome: Met     Problem: Bleeding (Cervical Cerclage)  Goal: Absence of Bleeding  Outcome: Met     Problem: Bowel Motility Impaired (Cervical Cerclage)  Goal: Effective Bowel Elimination  Outcome: Met     Problem: Change in Maternal-Fetal Status (Cervical Cerclage)  Goal: Optimal Maternal and Fetal Wellbeing  Outcome: Met     Problem: Fluid and Electrolyte Imbalance (Cervical Cerclage)  Goal: Fluid and Electrolyte Balance  Outcome: Met     Problem: Infection (Cervical Cerclage)  Goal: Absence of Infection Signs and Symptoms  Outcome: Met     Problem: Ongoing Anesthesia Effects (Cervical Cerclage)  Goal: Anesthesia/Sedation Recovery  Outcome: Met  Intervention: Optimize Anesthesia Recovery  Recent Flowsheet Documentation  Taken 6/16/2023 0830 by Amanda Padilla RN  Safety Promotion/Fall Prevention: safety round/check completed  Taken 6/16/2023 0645 by Amanda Padilla RN  Safety Promotion/Fall Prevention: safety round/check completed     Problem: Pain (Cervical Cerclage)  Goal: Acceptable Pain Control  Outcome: Met     Problem: Postoperative Nausea and Vomiting (Cervical Cerclage)  Goal: Nausea and Vomiting Relief  Outcome: Met     Problem: Postoperative Urinary Retention (Cervical Cerclage)  Goal: Effective Urinary Elimination  Outcome: Met     Problem: Respiratory Compromise (Cervical Cerclage)  Goal: Effective Oxygenation and Ventilation  Outcome: Met   Goal Outcome Evaluation:  Plan of Care Reviewed With: patient, spouse        Progress: improving  Outcome Evaluation: patient comfortable ambulkating, able to void, eating well.

## 2023-06-16 NOTE — ANESTHESIA PROCEDURE NOTES
Spinal Block      Patient reassessed immediately prior to procedure    Patient location during procedure: OB  Stop Time: 6/16/2023 7:46 AM  Indication:at surgeon's request  Performed By  Anesthesiologist: Mary Salazar MD CRNA/CAA: Sonali Clayton CRNA  Preanesthetic Checklist  Completed: patient identified, IV checked, site marked, risks and benefits discussed, surgical consent, monitors and equipment checked, pre-op evaluation and timeout performed  Spinal Block Prep:  Patient Position:sitting  Sterile Tech:gloves, cap, mask and sterile barriers  Prep:Chloraprep  Patient Monitoring:blood pressure monitoring and continuous pulse oximetry    Spinal Block Procedure  Approach:midline  Guidance:landmark technique and palpation technique  Location:L4-L5  Needle Type:Chivo  Needle Gauge:25  Placement of Spinal needle event:cerebrospinal fluid aspirated  Paresthesia: no  Fluid Appearance:clear  Medications: fentaNYL citrate (PF) (SUBLIMAZE) injection - Intrathecal   10 mcg - 6/16/2023 7:46:00 AM  bupivacaine PF (MARCAINE) 0.75 % injection - Spinal   1 mL - 6/16/2023 7:46:00 AM   Post Assessment  Patient Tolerance:patient tolerated the procedure well with no apparent complications  Complications no

## 2023-06-16 NOTE — OP NOTE
OPERATIVE NOTE     PRE-OP DIAGNOSIS: Hx of 14-15 week loss due to suspected cervical insufficiency      POST-OP DIAGNOSIS: SAME, s/p history-indicated cerclage     PROCEDURE: FANG CERVICAL CERCLAGE     Surgeon: JERZY KIM MD        Anesthesia: SPINAL     EBL:  10 CC, MINIMAL        INDICATION: 32 y.o.   at  12 0/7 weeks gestation (Estimated Date of Delivery: 23) with history of painless cervical dilation and short cervix leading up to a 14-15 week loss.       INFORMED CONSENT -   Risks, benefits, and alternatives to cervical cerclage discussed.   Risks of cerclage included but not limited to - pain, bleeding, damage to cervix, rupture of membranes, infection, need for removal of cerclage, failure of cerclage, damage to bowel/bladder. Benefits include advancing gestational age at time of delivery, or reduction in  birth with delivery at term. The alternative would be expectant management and likely previable/periviable delivery.       PROCEDURE:   Patient was taken to the OR after informed consent obtained and prepped and draped in the usual sterile fashion. Time out was performed and patient was a spinal was placed without complication.  She  was placed in dorsal lithotomy position in boot stirrups.   Perkins catheter was placed in a sterile fashion to drain bladder, removed, and vagina was irrigated with sterile betadine. Weighted speculum and mchugh retractor were placed and provided good visualization of the cervix.  The cervix appeared closed but shortened, but with a few cm of length to it once put on tension.  Spongestick clamps were used to graph the cervix at 12 o'clock and 6 o'clock to provide traction for the procedure.      A 5mm mersilene tape was placed in a circumferential, purse string fashion without complication with good visualization of the bladder, vaginal side walls, and the rectum.  A stitch was placed from 12 o'clock to 9 o'clock, from 9 o'clock to 7 o'clock,  from 7 o'clock to 4 o'clock, from 4 o'clock to 2 o'clock, and from 2 o'clock to 12 o'clock.   Cervical cerclage was tied down at 12 o'clock position and suture was cut with an approximate 1cm tail.  A small area of bleeding was noted at the external os.  Pressure was applied with good success.  Rectal exam was negative for suture.  Cervix was noted to be hemostatic without evidence of rupture of membrane.    Patient was cleaned and taken back to the recovery room in stable condition.        Fetal heart tones were normal in recovery.  Fetus was active and cervix appeared long from above.   Membranes appeared intact on US.      Nicolle Colorado MD FACOG  Maternal Fetal Medicine-[facility]  Office: [phone]  [email or web address]

## 2023-08-08 NOTE — PROGRESS NOTES
MATERNAL FETAL MEDICINE Consult Note    Dear Dr Pendleton,     Thank you for your kind referral of Cydney Soto.  As you know, she is a 32 y.o.   at  19 6/7 weeks gestation (Estimated Date of Delivery: 23). This is a consult.      Her antepartum course is complicated by:  Hx of 14-15 week loss due to suspected cervical insufficiency  S/p cerclage    Aneuploidy Screening: low risk    HPI: Today, she denies headache, blurry vision, RUQ pain. No vaginal bleeding, no contractions.     Review of History:  Past Medical History:   Diagnosis Date    Anxiety     Bilateral bunions     Chronic mental illness     Depression     GERD (gastroesophageal reflux disease)     Migraine     Pilonidal cyst     SCHEDULED FOR SX     Past Surgical History:   Procedure Laterality Date    CERVICAL CERCLAGE N/A 2023    Procedure: CERVICAL CERCLAGE;  Surgeon: Nicolle Colorado MD;  Location:  [facility];  Service: Obstetrics;  Laterality: N/A;    HAND LACERATION REPAIR      [age] YEAR OLD    PILONIDAL CYSTECTOMY N/A 2020    Procedure: PILONIDAL CYSTECTOMY;  Surgeon: Bk Martini MD;  Location:  [facility];  Service: General;  Laterality: N/A;    TONSILLECTOMY      WISDOM TOOTH EXTRACTION       Social History     Socioeconomic History    Marital status:    Tobacco Use    Smoking status: Every Day     Packs/day: 1.00     Years: 15.00     Pack years: 15.00     Types: Cigarettes     Passive exposure: Never    Smokeless tobacco: Never   Vaping Use    Vaping Use: Every day    Substances: Nicotine, Flavoring    Devices: Disposable   Substance and Sexual Activity    Alcohol use: Not Currently     Comment: monthly 1-2 DRINKS MAYBE MONTHLY    Drug use: No    Sexual activity: Yes     Partners: Male     Birth control/protection: None     Family History   Problem Relation Age of Onset    Depression Mother     Alcohol abuse Father     Learning disabilities Sister     Depression Maternal Grandmother     Heart  "disease Maternal Grandfather     Breast cancer Neg Hx     Ovarian cancer Neg Hx     Colon cancer Neg Hx     Deep vein thrombosis Neg Hx     Pulmonary embolism Neg Hx     Birth defects Neg Hx     Mental retardation Neg Hx       Allergies   Allergen Reactions    Penicillins Other (See Comments)     UNSURE OF REACTION, can tolerate Amoxicillin    Propranolol Anaphylaxis    Shrimp Other (See Comments)     Tongue itch      Current Outpatient Medications on File Prior to Visit   Medication Sig Dispense Refill    ferrous gluconate (FERGON) 324 MG tablet Take 1 tablet by mouth 2 (Two) Times a Day. (Patient taking differently: Take 1 tablet by mouth Every Other Day.) 60 tablet 2    prenatal vitamin (prenatal, CLASSIC, vitamin) tablet Take  by mouth Daily.      Progesterone (PROMETRIUM) 200 MG capsule Insert 1 capsule into the vagina Every Night. 30 capsule 5    [DISCONTINUED] Blood Glucose Monitoring Suppl (Accu-Chek Guide Me) w/Device kit 4 (Four) Times a Day. use as directed      [DISCONTINUED] glucose monitor monitoring kit Check blood sugar 4 times daily 1 each 1    [DISCONTINUED] Lancets misc Check blood sugar 4 times daily 120 each 6     No current facility-administered medications on file prior to visit.        Past obstetric, gynecological, medical, surgical, family and social history reviewed.  Relevant lab work and imaging reviewed.    Review of systems  Constitutional:  denies fever, chills, malaise.   ENT/Mouth:  denies sore throat, tinnitis  Eyes: denies vision changes/pain  CV:  denies chest pain  Respiratory:  denies cough/SOB  GI:  denies N/V, diarrhea, abdominal pain.    :   denies dysuria  Skin:  denies lesions or pruritis   Neuro:  denies weakness, focal neurologic symptoms    Vitals:    08/10/23 0916   BP: 105/58   BP Location: Right arm   Patient Position: Sitting   Pulse: 78   Temp: 98.2 øF (36.8 øC)   TempSrc: Temporal   Weight: 68 kg (150 lb)   Height: 162.6 cm (64\")       PHYSICAL EXAM   GENERAL: " Not in acute distress, AAOx3, pleasant  CARDIO: regular rate and rhythm  PULM: symmetric chest rise, speaking in complete sentences without difficulty  NEURO: awake, alert and oriented to person, place, and time  ABDOMINAL: No fundal tenderness, no rebound or guarding, gravid  EXTREMITIES: no bilateral lower extremity edema/tenderness  SKIN: Warm, well-perfused      ULTRASOUND   Please view full ultrasound note on Imaging tab in ViewPoint.  Breech presentation.  Fundal/posterior placenta.  MVP 5.0cm, which is normal.    g (AC 64%)  Anatomy appears normal except a few suboptimal heart views.   Cervical length 3.6 cm with cerclage intact and in good position.  Cerclage to external os measures 1.9 cm, which is reassuring.    ASSESSMENT/COUNSELIN y.o.   at  19 6/7 weeks gestation (Estimated Date of Delivery: 23).     -Pregnancy  [ X ] stable  [   ] improving [  ] worsening    Diagnoses and all orders for this visit:    1. Short cervix affecting pregnancy (Primary)    2. History of  delivery, currently pregnant in first trimester        Hx of midtrimester loss with short cervix, s/p cerclage:  Had short cervix of 1.8 cm prior to delivery and then vaginal bleeding at 14-15 weeks with subsequent delivery.    Previously counseled.  Now s/p cerclage.  Looks intact and in good position today.       We discussed post-cerclage precautions: pelvic rest, lifting precautions (no more than 15 lb), and no strenuous exercise.  Patient is amenable to this.       She is on vaginal progesterone.  Doing well.  Anatomy looks good except a couple suboptimal views.      She is doing well.  Congratulated on her progress.  Continuing vaginal progesterone.  She asked about orgasm and I told her there is no data but that orgasm causes uterine cramping so if she has cramping/spotting she needs to stop.  Basically discussed r/b and urged probably better not to but not great data.      Summary of Plan  -Continue  vaginal progesterone until 37 weeks  - Return in 1 month for follow up anatomy.  -Continue modified activity/pelvic rest  -Cerclage out at 37 weeks.      Follow-up: 3-4 weeks follow up anatomy    Thank you for the consult and opportunity to care for this patient.  Please feel free to reach out with any questions or concerns.      I spent 15 minutes caring for this patient on this date of service. This time includes time spent by me in the following activities: preparing for the visit, reviewing tests, obtaining and/or reviewing a separately obtained history, performing a medically appropriate examination and/or evaluation, counseling and educating the patient/family/caregiver and independently interpreting results and communicating that information with the patient/family/caregiver with greater than 50% spent in counseling and coordination of care.       I spent 5 minutes on the separately reported service of US imaging not included in the time used to support the E/M service also reported today.      Nicolle Colorado MD FACOG  Maternal Fetal Medicine-[facility]  Office: [phone]  [email or web address]

## 2023-08-10 ENCOUNTER — HOSPITAL ENCOUNTER (OUTPATIENT)
Dept: ULTRASOUND IMAGING | Facility: HOSPITAL | Age: 33
Discharge: HOME | End: 2023-08-10
Admitting: NURSE PRACTITIONER
Payer: COMMERCIAL

## 2023-08-10 ENCOUNTER — OFFICE VISIT (OUTPATIENT)
Dept: OBSTETRICS AND GYNECOLOGY | Facility: CLINIC | Age: 33
End: 2023-08-10
Payer: COMMERCIAL

## 2023-08-10 VITALS
TEMPERATURE: 98.2 F | HEIGHT: 64 IN | BODY MASS INDEX: 25.61 KG/M2 | WEIGHT: 150 LBS | SYSTOLIC BLOOD PRESSURE: 105 MMHG | DIASTOLIC BLOOD PRESSURE: 58 MMHG | HEART RATE: 78 BPM

## 2023-08-10 DIAGNOSIS — O09.891 HISTORY OF PRETERM DELIVERY, CURRENTLY PREGNANT IN FIRST TRIMESTER: ICD-10-CM

## 2023-08-10 DIAGNOSIS — O26.879 SHORT CERVIX AFFECTING PREGNANCY: Primary | ICD-10-CM

## 2023-08-10 PROCEDURE — 76817 TRANSVAGINAL US OBSTETRIC: CPT

## 2023-08-10 PROCEDURE — 76811 OB US DETAILED SNGL FETUS: CPT

## 2023-08-10 NOTE — PROGRESS NOTES
Pt. Reports that she is doing well and denies vaginal bleeding, cramping, contractions or leaking of fluid at this time. Reports active fetal movement.  Denies visual changes or epigastric pain.  C/O headaches. Denies any additional complaints at time of appointment.  Next OB appointment scheduled for 8/17/23.    Vitals:    08/10/23 0916   BP: 105/58   Pulse: 78   Temp: 98.2 øF (36.8 øC)

## 2023-08-10 NOTE — LETTER
August 10, 2023     Angelina Pendleton MD  [address]  [address]  [facility] [address]    Patient: Cydney Soto   YOB: 1990   Date of Visit: 8/10/2023       Dear Angelina Pendleton MD,    Thank you for referring Cydney Soto to me for evaluation. Below is a copy of my consult note.    If you have questions, please do not hesitate to call me. I look forward to following Cydney along with you.         Sincerely,        Nicolle Colorado MD        CC: No Recipients    MATERNAL FETAL MEDICINE Consult Note    Dear Dr Pendleton,     Thank you for your kind referral of Cydney Soto.  As you know, she is a 32 y.o.   at  14 3/7 weeks gestation (Estimated Date of Delivery: 23). This is a consult.      Her antepartum course is complicated by:  Hx of 14-15 week loss due to suspected cervical insufficiency    Aneuploidy Screening: low risk    HPI: Today, she denies headache, blurry vision, RUQ pain. No vaginal bleeding, no contractions.     Review of History:  Past Medical History:   Diagnosis Date    Anxiety     Bilateral bunions     Chronic mental illness     Depression     GERD (gastroesophageal reflux disease)     Migraine     Pilonidal cyst     SCHEDULED FOR SX     Past Surgical History:   Procedure Laterality Date    CERVICAL CERCLAGE N/A 2023    Procedure: CERVICAL CERCLAGE;  Surgeon: Nicolle Colorado MD;  Location:  [facility];  Service: Obstetrics;  Laterality: N/A;    HAND LACERATION REPAIR      [age] YEAR OLD    PILONIDAL CYSTECTOMY N/A 2020    Procedure: PILONIDAL CYSTECTOMY;  Surgeon: Bk Martini MD;  Location:  [facility];  Service: General;  Laterality: N/A;    TONSILLECTOMY      WISDOM TOOTH EXTRACTION       Social History     Socioeconomic History    Marital status:    Tobacco Use    Smoking status: Every Day     Packs/day: 1.00     Years: 15.00     Pack years: 15.00     Types: Cigarettes     Passive exposure: Never    Smokeless tobacco:  Never   Vaping Use    Vaping Use: Every day    Substances: Nicotine, Flavoring    Devices: Disposable   Substance and Sexual Activity    Alcohol use: Not Currently     Comment: monthly 1-2 DRINKS MAYBE MONTHLY    Drug use: No    Sexual activity: Yes     Partners: Male     Birth control/protection: None     Family History   Problem Relation Age of Onset    Depression Mother     Alcohol abuse Father     Learning disabilities Sister     Depression Maternal Grandmother     Heart disease Maternal Grandfather     Breast cancer Neg Hx     Ovarian cancer Neg Hx     Colon cancer Neg Hx     Deep vein thrombosis Neg Hx     Pulmonary embolism Neg Hx     Birth defects Neg Hx     Mental retardation Neg Hx       Allergies   Allergen Reactions    Penicillins Other (See Comments)     UNSURE OF REACTION, can tolerate Amoxicillin    Propranolol Anaphylaxis      Current Outpatient Medications on File Prior to Visit   Medication Sig Dispense Refill    Blood Glucose Monitoring Suppl (Accu-Chek Guide Me) w/Device kit 4 (Four) Times a Day. use as directed      ferrous gluconate (FERGON) 324 MG tablet Take 1 tablet by mouth 2 (Two) Times a Day. (Patient taking differently: Take 1 tablet by mouth Every Other Day.) 60 tablet 2    glucose monitor monitoring kit Check blood sugar 4 times daily 1 each 1    Lancets misc Check blood sugar 4 times daily 120 each 6    prenatal vitamin (prenatal, CLASSIC, vitamin) tablet Take  by mouth Daily.      Progesterone (PROMETRIUM) 200 MG capsule Insert 1 capsule into the vagina Every Night. 30 capsule 5     No current facility-administered medications on file prior to visit.        Past obstetric, gynecological, medical, surgical, family and social history reviewed.  Relevant lab work and imaging reviewed.    Review of systems  Constitutional:  denies fever, chills, malaise.   ENT/Mouth:  denies sore throat, tinnitis  Eyes: denies vision changes/pain  CV:  denies chest pain  Respiratory:  denies  cough/SOB  GI:  denies N/V, diarrhea, abdominal pain.    :   denies dysuria  Skin:  denies lesions or pruritis   Neuro:  denies weakness, focal neurologic symptoms    There were no vitals filed for this visit.        PHYSICAL EXAM   GENERAL: Not in acute distress, AAOx3, pleasant  CARDIO: regular rate and rhythm  PULM: symmetric chest rise, speaking in complete sentences without difficulty  NEURO: awake, alert and oriented to person, place, and time  ABDOMINAL: No fundal tenderness, no rebound or guarding, gravid  EXTREMITIES: no bilateral lower extremity edema/tenderness  SKIN: Warm, well-perfused      ULTRASOUND   Please view full ultrasound note on Imaging tab in ViewPoint.  Transverse Presentation.  Anterior fundal placenta.  MVP 4.15 cm, which is normal.   Cervical length is 3.6 cm, with cerclage intact and in good position 1.9 cm from external os.   Very limited anatomy appears normal for gestational age.      ASSESSMENT/COUNSELIN y.o.   at  14 3/7 weeks gestation (Estimated Date of Delivery: 23).     -Pregnancy  [ X ] stable  [   ] improving [  ] worsening    There are no diagnoses linked to this encounter.       Hx of midtrimester loss with short cervix, s/p cerclage:  Had short cervix of 1.8 cm prior to delivery and then vaginal bleeding at 14-15 weeks with subsequent delivery.    Previously counseled.  Now s/p cerclage.  Looks intact and in good position today.       We discussed post-cerclage precautions: pelvic rest, lifting precautions (no more than 15 lb), and no strenuous exercise.  Patient is amenable to this.       She would like to return for anatomy.   She is on vaginal progesterone.  Doing well.      Summary of Plan  -Continue vaginal progesterone until 37 weeks  -Follow up anatomy  -continue modified activity/pelvic rest  -Cerclage out at 37 weeks.      Follow-up: 2 weeks then for anatomy    Thank you for the consult and opportunity to care for this patient.  Please feel  free to reach out with any questions or concerns.      I spent 15 minutes caring for this patient on this date of service. This time includes time spent by me in the following activities: preparing for the visit, reviewing tests, obtaining and/or reviewing a separately obtained history, performing a medically appropriate examination and/or evaluation, counseling and educating the patient/family/caregiver and independently interpreting results and communicating that information with the patient/family/caregiver with greater than 50% spent in counseling and coordination of care.       I spent 5 minutes on the separately reported service of US imaging not included in the time used to support the E/M service also reported today.      Nicolle Colorado MD FACOG  Maternal Fetal Medicine-[facility]  Office: [phone]  [email or web address]          Pt. Reports that she is doing well and denies vaginal bleeding, cramping, contractions or leaking of fluid at this time. Reports active fetal movement.  Denies visual changes or epigastric pain.  C/O headaches. Denies any additional complaints at time of appointment.  Next OB appointment scheduled for 8/17/23.    Vitals:    08/10/23 0916   BP: 105/58   Pulse: 78   Temp: 98.2 øF (36.8 øC)

## 2023-09-07 ENCOUNTER — OFFICE VISIT (OUTPATIENT)
Dept: OBSTETRICS AND GYNECOLOGY | Facility: CLINIC | Age: 33
End: 2023-09-07
Payer: COMMERCIAL

## 2023-09-07 ENCOUNTER — HOSPITAL ENCOUNTER (OUTPATIENT)
Dept: ULTRASOUND IMAGING | Facility: HOSPITAL | Age: 33
Discharge: HOME | End: 2023-09-07
Admitting: OBSTETRICS & GYNECOLOGY
Payer: COMMERCIAL

## 2023-09-07 VITALS
TEMPERATURE: 98.2 F | BODY MASS INDEX: 26.26 KG/M2 | HEART RATE: 87 BPM | DIASTOLIC BLOOD PRESSURE: 56 MMHG | SYSTOLIC BLOOD PRESSURE: 112 MMHG | HEIGHT: 65 IN | WEIGHT: 157.6 LBS

## 2023-09-07 DIAGNOSIS — O09.899 SINGLE UMBILICAL ARTERY AFFECTING MANAGEMENT OF MOTHER, ANTEPARTUM, SINGLE GESTATION: ICD-10-CM

## 2023-09-07 DIAGNOSIS — Z87.42 HISTORY OF CERVICAL INCOMPETENCE: ICD-10-CM

## 2023-09-07 DIAGNOSIS — O26.879 SHORT CERVIX AFFECTING PREGNANCY: Primary | ICD-10-CM

## 2023-09-07 DIAGNOSIS — O09.892 HISTORY OF PRETERM DELIVERY, CURRENTLY PREGNANT IN SECOND TRIMESTER: ICD-10-CM

## 2023-09-07 PROBLEM — F32.A DEPRESSIVE DISORDER: Status: RESOLVED | Noted: 2017-09-14 | Resolved: 2023-09-07

## 2023-09-07 PROBLEM — K21.9 GASTROESOPHAGEAL REFLUX DISEASE: Status: RESOLVED | Noted: 2020-08-26 | Resolved: 2023-09-07

## 2023-09-07 PROCEDURE — 76816 OB US FOLLOW-UP PER FETUS: CPT

## 2023-09-07 PROCEDURE — 76817 TRANSVAGINAL US OBSTETRIC: CPT

## 2023-09-07 NOTE — LETTER
September 7, 2023       No Recipients    Patient: Cydney Soto   YOB: 1990   Date of Visit: 9/7/2023       Dear LALITO Velazco:    Thank you for referring Cydney Soto to me for evaluation. Below are the relevant portions of my assessment and plan of care.    Encounter Diagnosis and Orders:  There are no diagnoses linked to this encounter.    If you have questions, please do not hesitate to call me. I look forward to following Cydney along with you.         Sincerely,        LALITO Camara        CC:   No Recipients

## 2023-09-07 NOTE — PROGRESS NOTES
MATERNAL FETAL MEDICINE Consult Note    Dear Dr Pendleton,     Thank you for your kind referral of Cydney Soto.  As you know, she is a 32 y.o.   at  23 6/7 weeks gestation (Estimated Date of Delivery: 23). This is a consult.      Her antepartum course is complicated by:  Hx of 14-15 week loss due to suspected cervical insufficiency  S/p cerclage    Aneuploidy Screening: low risk    HPI: Today, she denies headache, blurry vision, RUQ pain. No vaginal bleeding, no contractions.     Review of History:  Past Medical History:   Diagnosis Date    Anxiety     Bilateral bunions     Chronic mental illness     Depression     Depressive disorder 2017    Gastroesophageal reflux disease 2020    GERD (gastroesophageal reflux disease)     Migraine     Pilonidal cyst     SCHEDULED FOR SX     Past Surgical History:   Procedure Laterality Date    CERVICAL CERCLAGE N/A 2023    Procedure: CERVICAL CERCLAGE;  Surgeon: Nicolle Colorado MD;  Location:  [facility];  Service: Obstetrics;  Laterality: N/A;    HAND LACERATION REPAIR      [age] YEAR OLD    PILONIDAL CYSTECTOMY N/A 2020    Procedure: PILONIDAL CYSTECTOMY;  Surgeon: Bk Martini MD;  Location: [facility];  Service: General;  Laterality: N/A;    TONSILLECTOMY      WISDOM TOOTH EXTRACTION       Social History     Socioeconomic History    Marital status:    Tobacco Use    Smoking status: Every Day     Packs/day: 1.00     Years: 15.00     Pack years: 15.00     Types: Cigarettes     Passive exposure: Never    Smokeless tobacco: Never   Vaping Use    Vaping Use: Every day    Substances: Nicotine, Flavoring    Devices: Disposable   Substance and Sexual Activity    Alcohol use: Not Currently     Comment: monthly 1-2 DRINKS MAYBE MONTHLY    Drug use: No    Sexual activity: Yes     Partners: Male     Birth control/protection: None     Family History   Problem Relation Age of Onset    Depression Mother     Alcohol abuse Father      "Learning disabilities Sister     Depression Maternal Grandmother     Heart disease Maternal Grandfather     Breast cancer Neg Hx     Ovarian cancer Neg Hx     Colon cancer Neg Hx     Deep vein thrombosis Neg Hx     Pulmonary embolism Neg Hx     Birth defects Neg Hx     Mental retardation Neg Hx       Allergies   Allergen Reactions    Penicillins Other (See Comments)     UNSURE OF REACTION, can tolerate Amoxicillin    Propranolol Anaphylaxis    Shrimp Other (See Comments)     Tongue itch      Current Outpatient Medications on File Prior to Visit   Medication Sig Dispense Refill    ferrous gluconate (FERGON) 324 MG tablet Take 1 tablet by mouth 2 (Two) Times a Day. (Patient taking differently: Take 1 tablet by mouth Every Other Day.) 60 tablet 2    prenatal vitamin (prenatal, CLASSIC, vitamin) tablet Take  by mouth Daily.      Progesterone (PROMETRIUM) 200 MG capsule Insert 1 capsule into the vagina Every Night. 30 capsule 5     No current facility-administered medications on file prior to visit.      Past obstetric, gynecological, medical, surgical, family and social history reviewed.  Relevant lab work and imaging reviewed.    Review of systems  Constitutional:  denies fever, chills, malaise.   ENT/Mouth:  denies sore throat, tinnitis  Eyes: denies vision changes/pain  CV:  denies chest pain  Respiratory:  denies cough/SOB  GI:  denies N/V, diarrhea, abdominal pain.    :   denies dysuria  Skin:  denies lesions or pruritis   Neuro:  denies weakness, focal neurologic symptoms    Vitals:    09/07/23 1308 09/07/23 1358   BP: 141/56 112/56   BP Location: Right arm Right arm   Patient Position: Sitting Sitting   Pulse: 87    Temp: 98.2 °F (36.8 °C)    TempSrc: Temporal    Weight: 71.5 kg (157 lb 9.6 oz)    Height: 163.8 cm (64.5\")      PHYSICAL EXAM   GENERAL: Not in acute distress, AAOx3, pleasant  CARDIO: regular rate and rhythm  PULM: symmetric chest rise, speaking in complete sentences without difficulty  NEURO: " awake, alert and oriented to person, place, and time  ABDOMINAL: No fundal tenderness, no rebound or guarding, gravid  EXTREMITIES: no bilateral lower extremity edema/tenderness  SKIN: Warm, well-perfused    ULTRASOUND   Please view full ultrasound note on Imaging tab in ViewPoint.  Breech presentation.  Posterior placenta.  MVP 5.8 cm, which is normal.   g (61%, AC 84%)  Anatomy appears normal. 2VC noted.  CL 3.9 cm, which is normal. Cerclage intact and in good position.    ASSESSMENT/COUNSELIN y.o.   at  23 6/7 weeks gestation (Estimated Date of Delivery: 23).    -Pregnancy  [ X ] stable  [   ] improving [  ] worsening    Diagnoses and all orders for this visit:    1. Short cervix affecting pregnancy (Primary)    2. History of  delivery, currently pregnant in second trimester    3. History of cervical incompetence    4. Single umbilical artery affecting management of mother, antepartum, single gestation    Hx of midtrimester loss with short cervix, s/p cerclage:  Had short cervix of 1.8 cm prior to delivery and then vaginal bleeding at 14-15 weeks with subsequent delivery.    Previously counseled.  Now s/p cerclage.  Looks intact and in good position today.       We discussed post-cerclage precautions: pelvic rest, lifting precautions (no more than 15 lb), and no strenuous exercise.  Patient is amenable to this.       She is doing well.  Congratulated on her progress.  Continuing vaginal progesterone. Anatomy completed today.  Cervical length is normal. Will have monthly growth ultrasound at Primary OB's office (Dr Pendleton). Primary OB may consider ANFS beginning at 36wks.      Single umbilical artery:   The normal umbilical cord contains 2 arteries and 1 vein. But atrophy or agenesis of one of the arteries results in a single umbilical artery.  The incidence of SUA is about 1% of gutierrez pregnancies and as high as 5% of twins.  An isolated SUA with no other structural or  chromosomal abnormality should be distinguished from an SUA that is present with other abnormalities. Co-existing structural abnormalities most commonly involve the cardiovascular and renal systems.   With an SUA and one or multiple structural abnormalities, the frequency of associated aneuploidy ranges from 4% to 50% depending on the type and amount of other anomalies.  Isolated SUA has been associated in some studies with an increased risk of stillbirth and FGR, although other studies suggest that an isolated SUA does not place the fetus at increased risk of FGR.     Recommend monthly ultrasound to assess fetal growth.     Summary of Plan  -Continue vaginal progesterone until 37 weeks  -Monthly growth ultrasounds  -Continue modified activity/pelvic rest  -Consider weekly ANFS beginning at 36wks.   -Cerclage out at 37 weeks.      Follow-up: With primary OB in 1 month.    Thank you for the consult and opportunity to care for this patient.  Please feel free to reach out with any questions or concerns.      I spent 20 minutes caring for this patient on this date of service. This time includes time spent by me in the following activities: preparing for the visit, reviewing tests, obtaining and/or reviewing a separately obtained history, performing a medically appropriate examination and/or evaluation, counseling and educating the patient/family/caregiver and independently interpreting results and communicating that information with the patient/family/caregiver with greater than 50% spent in counseling and coordination of care.       LALITO Hernandez  Maternal Fetal Medicine-[facility]  Office: [phone]  [email or web address]

## 2023-09-07 NOTE — LETTER
2023     Angelina Pendleton MD  [address]  [address]  [facility] [address]    Patient: Cydney Soto   YOB: 1990   Date of Visit: 2023       Dear Angelina Pendleton MD:    Thank you for referring Cydney Soto to me for evaluation. Below are the relevant portions of my assessment and plan of care.    Encounter Diagnosis and Orders:  Diagnoses and all orders for this visit:    1. Short cervix affecting pregnancy (Primary)    2. History of  delivery, currently pregnant in second trimester    3. History of cervical incompetence    4. Single umbilical artery affecting management of mother, antepartum, single gestation        If you have questions, please do not hesitate to call me. I look forward to following Cydney along with you.         Sincerely,        LALITO Camara        MATERNAL FETAL MEDICINE Consult Note    Dear Dr Pendleton,     Thank you for your kind referral of Cydney Soto.  As you know, she is a 32 y.o.   at  23 6/7 weeks gestation (Estimated Date of Delivery: 23). This is a consult.      Her antepartum course is complicated by:  Hx of 14-15 week loss due to suspected cervical insufficiency  S/p cerclage    Aneuploidy Screening: low risk    HPI: Today, she denies headache, blurry vision, RUQ pain. No vaginal bleeding, no contractions.     Review of History:  Past Medical History:   Diagnosis Date   • Anxiety    • Bilateral bunions    • Chronic mental illness    • Depression    • Depressive disorder 2017   • Gastroesophageal reflux disease 2020   • GERD (gastroesophageal reflux disease)    • Migraine    • Pilonidal cyst     SCHEDULED FOR SX     Past Surgical History:   Procedure Laterality Date   • CERVICAL CERCLAGE N/A 2023    Procedure: CERVICAL CERCLAGE;  Surgeon: Nicolle Colorado MD;  Location: [facility];  Service: Obstetrics;  Laterality: N/A;   • HAND LACERATION REPAIR      [age] YEAR OLD   •  PILONIDAL CYSTECTOMY N/A 7/17/2020    Procedure: PILONIDAL CYSTECTOMY;  Surgeon: Bk Martini MD;  Location: [facility];  Service: General;  Laterality: N/A;   • TONSILLECTOMY     • WISDOM TOOTH EXTRACTION       Social History     Socioeconomic History   • Marital status:    Tobacco Use   • Smoking status: Every Day     Packs/day: 1.00     Years: 15.00     Pack years: 15.00     Types: Cigarettes     Passive exposure: Never   • Smokeless tobacco: Never   Vaping Use   • Vaping Use: Every day   • Substances: Nicotine, Flavoring   • Devices: Disposable   Substance and Sexual Activity   • Alcohol use: Not Currently     Comment: monthly 1-2 DRINKS MAYBE MONTHLY   • Drug use: No   • Sexual activity: Yes     Partners: Male     Birth control/protection: None     Family History   Problem Relation Age of Onset   • Depression Mother    • Alcohol abuse Father    • Learning disabilities Sister    • Depression Maternal Grandmother    • Heart disease Maternal Grandfather    • Breast cancer Neg Hx    • Ovarian cancer Neg Hx    • Colon cancer Neg Hx    • Deep vein thrombosis Neg Hx    • Pulmonary embolism Neg Hx    • Birth defects Neg Hx    • Mental retardation Neg Hx       Allergies   Allergen Reactions   • Penicillins Other (See Comments)     UNSURE OF REACTION, can tolerate Amoxicillin   • Propranolol Anaphylaxis   • Shrimp Other (See Comments)     Tongue itch      Current Outpatient Medications on File Prior to Visit   Medication Sig Dispense Refill   • ferrous gluconate (FERGON) 324 MG tablet Take 1 tablet by mouth 2 (Two) Times a Day. (Patient taking differently: Take 1 tablet by mouth Every Other Day.) 60 tablet 2   • prenatal vitamin (prenatal, CLASSIC, vitamin) tablet Take  by mouth Daily.     • Progesterone (PROMETRIUM) 200 MG capsule Insert 1 capsule into the vagina Every Night. 30 capsule 5     No current facility-administered medications on file prior to visit.      Past obstetric, gynecological, medical,  "surgical, family and social history reviewed.  Relevant lab work and imaging reviewed.    Review of systems  Constitutional:  denies fever, chills, malaise.   ENT/Mouth:  denies sore throat, tinnitis  Eyes: denies vision changes/pain  CV:  denies chest pain  Respiratory:  denies cough/SOB  GI:  denies N/V, diarrhea, abdominal pain.    :   denies dysuria  Skin:  denies lesions or pruritis   Neuro:  denies weakness, focal neurologic symptoms    Vitals:    23 1308 23 1358   BP: 141/56 112/56   BP Location: Right arm Right arm   Patient Position: Sitting Sitting   Pulse: 87    Temp: 98.2 °F (36.8 °C)    TempSrc: Temporal    Weight: 71.5 kg (157 lb 9.6 oz)    Height: 163.8 cm (64.5\")      PHYSICAL EXAM   GENERAL: Not in acute distress, AAOx3, pleasant  CARDIO: regular rate and rhythm  PULM: symmetric chest rise, speaking in complete sentences without difficulty  NEURO: awake, alert and oriented to person, place, and time  ABDOMINAL: No fundal tenderness, no rebound or guarding, gravid  EXTREMITIES: no bilateral lower extremity edema/tenderness  SKIN: Warm, well-perfused    ULTRASOUND   Please view full ultrasound note on Imaging tab in ViewPoint.  Breech presentation.  Posterior placenta.  MVP 5.8 cm, which is normal.   g (61%, AC 84%)  Anatomy appears normal. 2VC noted.  CL 3.9 cm, which is normal. Cerclage intact and in good position.    ASSESSMENT/COUNSELIN y.o.   at  23 6/7 weeks gestation (Estimated Date of Delivery: 23).    -Pregnancy  [ X ] stable  [   ] improving [  ] worsening    Diagnoses and all orders for this visit:    1. Short cervix affecting pregnancy (Primary)    2. History of  delivery, currently pregnant in second trimester    3. History of cervical incompetence    4. Single umbilical artery affecting management of mother, antepartum, single gestation    Hx of midtrimester loss with short cervix, s/p cerclage:  Had short cervix of 1.8 cm prior to " delivery and then vaginal bleeding at 14-15 weeks with subsequent delivery.    Previously counseled.  Now s/p cerclage.  Looks intact and in good position today.       We discussed post-cerclage precautions: pelvic rest, lifting precautions (no more than 15 lb), and no strenuous exercise.  Patient is amenable to this.       She is doing well.  Congratulated on her progress.  Continuing vaginal progesterone. Anatomy completed today.  Cervical length is normal. Will have monthly growth ultrasound at Primary OB's office (Dr Pendleton). Primary OB may consider ANFS beginning at 36wks.      Single umbilical artery:   The normal umbilical cord contains 2 arteries and 1 vein. But atrophy or agenesis of one of the arteries results in a single umbilical artery.  The incidence of SUA is about 1% of gutierrez pregnancies and as high as 5% of twins.  An isolated SUA with no other structural or chromosomal abnormality should be distinguished from an SUA that is present with other abnormalities. Co-existing structural abnormalities most commonly involve the cardiovascular and renal systems.   With an SUA and one or multiple structural abnormalities, the frequency of associated aneuploidy ranges from 4% to 50% depending on the type and amount of other anomalies.  Isolated SUA has been associated in some studies with an increased risk of stillbirth and FGR, although other studies suggest that an isolated SUA does not place the fetus at increased risk of FGR.     Recommend monthly ultrasound to assess fetal growth.     Summary of Plan  -Continue vaginal progesterone until 37 weeks  -Monthly growth ultrasounds  -Continue modified activity/pelvic rest  -Consider weekly ANFS beginning at 36wks.   -Cerclage out at 37 weeks.      Follow-up: With primary OB in 1 month.    Thank you for the consult and opportunity to care for this patient.  Please feel free to reach out with any questions or concerns.      I spent 20 minutes caring for this  patient on this date of service. This time includes time spent by me in the following activities: preparing for the visit, reviewing tests, obtaining and/or reviewing a separately obtained history, performing a medically appropriate examination and/or evaluation, counseling and educating the patient/family/caregiver and independently interpreting results and communicating that information with the patient/family/caregiver with greater than 50% spent in counseling and coordination of care.       LALITO Hernandez  Maternal Fetal Medicine-[facility]  Office: [phone]  [email or web address]

## 2023-09-07 NOTE — LETTER
September 7, 2023     Angelina Pendleton MD  [address]  [facility] [address]  [facility] [address]    Patient: Cydney Soto   YOB: 1990   Date of Visit: 9/7/2023       Dear Angelina Pendleton MD,    Thank you for referring Cydney Soto to me for evaluation. Below is a copy of my consult note.    If you have questions, please do not hesitate to call me. I look forward to following Cydney along with you.         Sincerely,        LALITO Camara        CC: No Recipients    Pt. Reports that she is doing well and denies vaginal bleeding, cramping, contractions or leaking of fluid at this time. Reports active fetal movement.  Denies headache, visual changes or epigastric pain.  Denies any additional complaints at time of appointment.  Next OB appointment scheduled for 10/5/23.    Vitals:    09/07/23 1308   BP: 141/56   Pulse: 87   Temp: 98.2 °F (36.8 °C)

## 2023-09-07 NOTE — PROGRESS NOTES
Pt. Reports that she is doing well and denies vaginal bleeding, cramping, contractions or leaking of fluid at this time. Reports active fetal movement.  Denies headache, visual changes or epigastric pain.  Denies any additional complaints at time of appointment.  Next OB appointment scheduled for 10/5/23.    Vitals:    09/07/23 1308   BP: 141/56   Pulse: 87   Temp: 98.2 °F (36.8 °C)

## 2023-10-02 ENCOUNTER — HOSPITAL ENCOUNTER (EMERGENCY)
Facility: HOSPITAL | Age: 33
Discharge: HOME | End: 2023-10-02
Attending: OBSTETRICS & GYNECOLOGY | Admitting: OBSTETRICS & GYNECOLOGY
Payer: COMMERCIAL

## 2023-10-02 VITALS
TEMPERATURE: 98.4 F | HEART RATE: 71 BPM | WEIGHT: 164 LBS | DIASTOLIC BLOOD PRESSURE: 59 MMHG | OXYGEN SATURATION: 100 % | HEIGHT: 65 IN | BODY MASS INDEX: 27.32 KG/M2 | RESPIRATION RATE: 18 BRPM | SYSTOLIC BLOOD PRESSURE: 109 MMHG

## 2023-10-02 LAB
BILIRUB UR QL STRIP: NEGATIVE
CLARITY UR: CLEAR
COLOR UR: YELLOW
GLUCOSE UR STRIP-MCNC: ABNORMAL MG/DL
HGB UR QL STRIP.AUTO: NEGATIVE
KETONES UR QL STRIP: ABNORMAL
LEUKOCYTE ESTERASE UR QL STRIP.AUTO: NEGATIVE
NITRITE UR QL STRIP: NEGATIVE
PH UR STRIP.AUTO: 6 [PH] (ref 5–8)
PROT UR QL STRIP: NEGATIVE
SP GR UR STRIP: 1.01 (ref 1–1.03)
UROBILINOGEN UR QL STRIP: ABNORMAL

## 2023-10-02 PROCEDURE — 87086 URINE CULTURE/COLONY COUNT: CPT | Performed by: OBSTETRICS & GYNECOLOGY

## 2023-10-02 PROCEDURE — 99284 EMERGENCY DEPT VISIT MOD MDM: CPT | Performed by: OBSTETRICS & GYNECOLOGY

## 2023-10-02 PROCEDURE — 81003 URINALYSIS AUTO W/O SCOPE: CPT | Performed by: OBSTETRICS & GYNECOLOGY

## 2023-10-02 PROCEDURE — 59025 FETAL NON-STRESS TEST: CPT

## 2023-10-02 RX ORDER — SENNOSIDES 8.6 MG
650 CAPSULE ORAL EVERY 8 HOURS PRN
COMMUNITY

## 2023-10-02 NOTE — OBED NOTES
[facility] Note [facility]    Patient Name: Cydney Soto  YOB: 1990  MRN: [id number]  Admission Date: 10/2/2023  4:36 AM  Date of Service: 10/2/2023    Chief Complaint: Vaginal Bleeding (Spotted at 0100 when went to void.  No additional spotting Cerclage in place.  Denies contractions.  +fm)        Subjective     Cydney Soto is a 32 y.o. female  at 27w3d with Estimated Date of Delivery: 23 who presents with the chief complaint listed above. Pt has hx of 15 week loss cw incompetent cervix so had cerclage placed at 13 weeks. Pt has been on pelvic rest and up to today things have gone well.     She sees Angelina Pendleton MD for her prenatal care. Her pregnancy has been complicated by:  SUA, anemia, migraines, depression, anxiety    She describes fetal movement as normal.  She denies rupture of membranes.  She denies active vaginal bleeding. She is not feeling contractions.        Objective   Patient Active Problem List    Diagnosis     History of cervical incompetence [Z87.42]     Pregnancy [Z34.90]     Complete  [O03.9]     Iron deficiency anemia [D50.9]     Cigarette smoker [F17.210]         OB History    Para Term  AB Living   3 0 0 0 2 0   SAB IAB Ectopic Molar Multiple Live Births   1 0 0 0 0 0      # Outcome Date GA Lbr Eric/2nd Weight Sex Delivery Anes PTL Lv   3 Current            2 AB 2022 15w0d          1 SAB  4w0d               Past Medical History:   Diagnosis Date    Anxiety     Bilateral bunions     Chronic mental illness     Depression and anxiety    Depression     Depressive disorder 2017    Gastroesophageal reflux disease 2020    GERD (gastroesophageal reflux disease)     Migraine     Pilonidal cyst     SCHEDULED FOR SX       Past Surgical History:   Procedure Laterality Date    CERVICAL CERCLAGE N/A 2023    Procedure: CERVICAL CERCLAGE;  Surgeon: Nicolle Colorado MD;  Location: [facility] LABOR [facility];  Service: Obstetrics;   Laterality: N/A;    HAND LACERATION REPAIR      [age] YEAR OLD    PILONIDAL CYSTECTOMY N/A 7/17/2020    Procedure: PILONIDAL CYSTECTOMY;  Surgeon: Bk Martini MD;  Location: [facility];  Service: General;  Laterality: N/A;    TONSILLECTOMY      WISDOM TOOTH EXTRACTION         No current facility-administered medications on file prior to encounter.     Current Outpatient Medications on File Prior to Encounter   Medication Sig Dispense Refill    ferrous gluconate (FERGON) 324 MG tablet Take 1 tablet by mouth 2 (Two) Times a Day. (Patient taking differently: Take 1 tablet by mouth Every Other Day.) 60 tablet 2    prenatal vitamin (prenatal, CLASSIC, vitamin) tablet Take  by mouth Daily.      Progesterone (PROMETRIUM) 200 MG capsule Insert 1 capsule into the vagina Every Night. 30 capsule 5    acetaminophen (TYLENOL) 650 MG 8 hr tablet Take 1 tablet by mouth Every 8 (Eight) Hours As Needed for Mild Pain.         Allergies   Allergen Reactions    Penicillins Other (See Comments)     UNSURE OF REACTION, can tolerate Amoxicillin    Propranolol Anaphylaxis    Shrimp Other (See Comments)     Tongue itch       Family History   Problem Relation Age of Onset    Depression Mother     Alcohol abuse Father     Learning disabilities Sister     Depression Maternal Grandmother     Heart disease Maternal Grandfather     Breast cancer Neg Hx     Ovarian cancer Neg Hx     Colon cancer Neg Hx     Deep vein thrombosis Neg Hx     Pulmonary embolism Neg Hx     Birth defects Neg Hx     Mental retardation Neg Hx        Social History     Socioeconomic History    Marital status:    Tobacco Use    Smoking status: Former     Packs/day: 1.00     Years: 15.00     Pack years: 15.00     Types: Cigarettes     Passive exposure: Never    Smokeless tobacco: Never   Vaping Use    Vaping Use: Every day    Substances: Nicotine, Flavoring    Devices: Disposable   Substance and Sexual Activity    Alcohol use: Not Currently     Comment: monthly 1-2  "DRINKS MAYBE MONTHLY    Drug use: No    Sexual activity: Yes     Partners: Male     Birth control/protection: None           Review of Systems   Constitutional:  Negative for chills and fever.   HENT: Negative.     Eyes:  Negative for photophobia and visual disturbance.   Respiratory:  Negative for shortness of breath.    Cardiovascular:  Negative for chest pain.   Gastrointestinal:  Negative for nausea.   Genitourinary:  Positive for vaginal discharge.   Psychiatric/Behavioral:  The patient is not nervous/anxious.         PHYSICAL EXAM:      VITAL SIGNS:  Vitals:    10/02/23 0500 10/02/23 0509 10/02/23 0515   BP: 104/64  110/73   Pulse: 89  86   Resp:  18    Temp:  98.4 °F (36.9 °C)    SpO2:  100%    Weight:  74.4 kg (164 lb)    Height:  163.8 cm (64.5\")             FHT'S:    130 with moderate variability and accels                                     PHYSICAL EXAM:      General: well developed; well nourished  no acute distress   Heart: Not performed.   Lungs   breathing is unlabored   Abdomen: Gravid and non tender     Extremities: trace edema, DTRs 1 plus, no clonus       Cervix: Per myself, cerclage intact, cervix closed, NO blood noted on glove        Contractions:   none                    LABS AND TESTING ORDERED:  Uterine and fetal monitoring  Urinalysis  Pelvic check    LAB RESULTS:    No results found for this or any previous visit (from the past 24 hour(s)).    Lab Results   Component Value Date    ABO B 06/16/2023    RH Positive 06/16/2023       No results found for: STREPGPB              External Prenatal Results       Pregnancy Outside Results - Transcribed From Office Records - See Scanned Records For Details       Test Value Date Time    ABO  B  06/16/23 0618    Rh  Positive  06/16/23 0618    Antibody Screen  Negative  06/16/23 0618    Varicella IgG  984 index 06/15/22 1124    Rubella  3.71 index 06/15/22 1124    Hgb  10.0 g/dL 06/16/23 0618       10.9 g/dL 05/03/23 1951    Hct  30.7 % 06/16/23 0618 "       33.7 % 23    Glucose Fasting GTT  84 mg/dL 22 0909    Glucose Tolerance Test 1 hour  190 mg/dL 22 0909    Glucose Tolerance Test 3 hour       Gonorrhea (discrete)  Negative  06/15/22 1429    Chlamydia (discrete)  Negative  06/15/22 1429    RPR  Non Reactive  06/15/22 1124    VDRL       Syphilis Antibody       HBsAg  Negative  06/15/22 1124    Herpes Simplex Virus PCR       Herpes Simplex VIrus Culture       HIV  Non Reactive  06/15/22 1124    Hep C RNA Quant PCR       Hep C Antibody  <0.1 s/co ratio 06/15/22 1124    AFP       Group B Strep       GBS Susceptibility to Clindamycin       GBS Susceptibility to Erythromycin       Fetal Fibronectin       Genetic Testing, Maternal Blood                 Drug Screening       Test Value Date Time    Urine Drug Screen       Amphetamine Screen  Negative ng/mL 06/15/22 1434    Barbiturate Screen  Negative ng/mL 06/15/22 1434    Benzodiazepine Screen  Negative ng/mL 06/15/22 1434    Methadone Screen  Negative ng/mL 06/15/22 1434    Phencyclidine Screen  Negative ng/mL 06/15/22 1434    Opiates Screen       THC Screen       Cocaine Screen       Propoxyphene Screen  Negative ng/mL 06/15/22 1434    Buprenorphine Screen       Methamphetamine Screen       Oxycodone Screen       Tricyclic Antidepressants Screen                 Legend    ^: Historical                              Impression:   @ 27w3d .  Final Diagnosis: hx of cervical incompetence, pt report vaginal spotting, no active bleeding noted    Plan:  1. Discharge to home.    2. Plan of care has been reviewed with patient along with risks, benefits of treatment.   All questions have been answered. Call health care provider for any further concerns and keep office appointments.  3. Bleeding and PTL precautions, comfort measures      I discussed the patients findings and my recommendations with patient, family, and nursing staff      Mary Walden MD  10/2/2023  05:30 EDT

## 2023-10-03 LAB — BACTERIA SPEC AEROBE CULT: NORMAL

## 2023-10-10 ENCOUNTER — TRANSCRIBE ORDERS (OUTPATIENT)
Dept: DIABETES SERVICES | Facility: HOSPITAL | Age: 33
End: 2023-10-10
Payer: COMMERCIAL

## 2023-10-10 DIAGNOSIS — O24.410 DIET CONTROLLED GESTATIONAL DIABETES MELLITUS (GDM) IN SECOND TRIMESTER: Primary | ICD-10-CM

## 2023-10-11 ENCOUNTER — HOSPITAL ENCOUNTER (OUTPATIENT)
Dept: DIABETES SERVICES | Facility: HOSPITAL | Age: 33
Discharge: HOME | End: 2023-10-11
Admitting: NURSE PRACTITIONER
Payer: COMMERCIAL

## 2023-10-11 PROCEDURE — G0108 DIAB MANAGE TRN  PER INDIV: HCPCS

## 2023-10-18 ENCOUNTER — TRANSCRIBE ORDERS (OUTPATIENT)
Dept: ADMINISTRATIVE | Facility: HOSPITAL | Age: 33
End: 2023-10-18
Payer: COMMERCIAL

## 2023-10-18 DIAGNOSIS — O99.019 ANEMIA DURING PREGNANCY: Primary | ICD-10-CM

## 2023-10-20 RX ORDER — ACETAMINOPHEN 325 MG/1
650 TABLET ORAL ONCE
OUTPATIENT
Start: 2023-10-24

## 2023-10-20 RX ORDER — DIPHENHYDRAMINE HCL 25 MG
25 CAPSULE ORAL ONCE
OUTPATIENT
Start: 2023-10-24

## 2023-10-24 ENCOUNTER — HOSPITAL ENCOUNTER (OUTPATIENT)
Dept: INFUSION THERAPY | Facility: HOSPITAL | Age: 33
Discharge: HOME | End: 2023-10-24
Payer: COMMERCIAL

## 2023-10-31 ENCOUNTER — HOSPITAL ENCOUNTER (OUTPATIENT)
Dept: INFUSION THERAPY | Facility: HOSPITAL | Age: 33
Discharge: HOME | End: 2023-10-31
Admitting: OBSTETRICS & GYNECOLOGY
Payer: COMMERCIAL

## 2023-10-31 VITALS
OXYGEN SATURATION: 98 % | RESPIRATION RATE: 20 BRPM | SYSTOLIC BLOOD PRESSURE: 116 MMHG | DIASTOLIC BLOOD PRESSURE: 68 MMHG | TEMPERATURE: 97.1 F | HEART RATE: 68 BPM

## 2023-10-31 DIAGNOSIS — O99.019 ANTEPARTUM ANEMIA: Primary | ICD-10-CM

## 2023-10-31 PROCEDURE — 96366 THER/PROPH/DIAG IV INF ADDON: CPT

## 2023-10-31 PROCEDURE — 25810000003 SODIUM CHLORIDE 0.9 % SOLUTION 250 ML FLEX CONT: Performed by: OBSTETRICS & GYNECOLOGY

## 2023-10-31 PROCEDURE — 96365 THER/PROPH/DIAG IV INF INIT: CPT

## 2023-10-31 PROCEDURE — 63710000001 DIPHENHYDRAMINE PER 50 MG: Performed by: OBSTETRICS & GYNECOLOGY

## 2023-10-31 PROCEDURE — 25010000002 IRON SUCROSE PER 1 MG: Performed by: OBSTETRICS & GYNECOLOGY

## 2023-10-31 RX ORDER — ACETAMINOPHEN 325 MG/1
650 TABLET ORAL ONCE
OUTPATIENT
Start: 2023-11-07

## 2023-10-31 RX ORDER — DIPHENHYDRAMINE HCL 25 MG
25 CAPSULE ORAL ONCE
Status: COMPLETED | OUTPATIENT
Start: 2023-10-31 | End: 2023-10-31

## 2023-10-31 RX ORDER — ACETAMINOPHEN 325 MG/1
650 TABLET ORAL ONCE
Status: COMPLETED | OUTPATIENT
Start: 2023-10-31 | End: 2023-10-31

## 2023-10-31 RX ORDER — DIPHENHYDRAMINE HCL 25 MG
25 CAPSULE ORAL ONCE
OUTPATIENT
Start: 2023-11-07

## 2023-10-31 RX ADMIN — DIPHENHYDRAMINE HYDROCHLORIDE 25 MG: 25 CAPSULE ORAL at 08:56

## 2023-10-31 RX ADMIN — IRON SUCROSE 300 MG: 20 INJECTION, SOLUTION INTRAVENOUS at 09:15

## 2023-10-31 RX ADMIN — ACETAMINOPHEN 650 MG: 325 TABLET ORAL at 08:54

## 2023-11-07 ENCOUNTER — HOSPITAL ENCOUNTER (OUTPATIENT)
Dept: INFUSION THERAPY | Facility: HOSPITAL | Age: 33
Discharge: HOME | End: 2023-11-07
Admitting: OBSTETRICS & GYNECOLOGY
Payer: COMMERCIAL

## 2023-11-07 VITALS
OXYGEN SATURATION: 98 % | TEMPERATURE: 98.4 F | HEART RATE: 67 BPM | RESPIRATION RATE: 18 BRPM | DIASTOLIC BLOOD PRESSURE: 48 MMHG | SYSTOLIC BLOOD PRESSURE: 114 MMHG

## 2023-11-07 DIAGNOSIS — O99.019 ANTEPARTUM ANEMIA: Primary | ICD-10-CM

## 2023-11-07 PROCEDURE — 96365 THER/PROPH/DIAG IV INF INIT: CPT

## 2023-11-07 PROCEDURE — 25810000003 SODIUM CHLORIDE 0.9 % SOLUTION 250 ML FLEX CONT: Performed by: OBSTETRICS & GYNECOLOGY

## 2023-11-07 PROCEDURE — 96366 THER/PROPH/DIAG IV INF ADDON: CPT

## 2023-11-07 PROCEDURE — 25010000002 IRON SUCROSE PER 1 MG: Performed by: OBSTETRICS & GYNECOLOGY

## 2023-11-07 PROCEDURE — 63710000001 DIPHENHYDRAMINE PER 50 MG: Performed by: OBSTETRICS & GYNECOLOGY

## 2023-11-07 RX ORDER — ACETAMINOPHEN 325 MG/1
650 TABLET ORAL ONCE
OUTPATIENT
Start: 2023-11-14

## 2023-11-07 RX ORDER — ACETAMINOPHEN 325 MG/1
650 TABLET ORAL ONCE
Status: COMPLETED | OUTPATIENT
Start: 2023-11-07 | End: 2023-11-07

## 2023-11-07 RX ORDER — DIPHENHYDRAMINE HCL 25 MG
25 CAPSULE ORAL ONCE
OUTPATIENT
Start: 2023-11-14

## 2023-11-07 RX ORDER — DIPHENHYDRAMINE HCL 25 MG
25 CAPSULE ORAL ONCE
Status: COMPLETED | OUTPATIENT
Start: 2023-11-07 | End: 2023-11-07

## 2023-11-07 RX ADMIN — ACETAMINOPHEN 650 MG: 325 TABLET, FILM COATED ORAL at 08:52

## 2023-11-07 RX ADMIN — DIPHENHYDRAMINE HYDROCHLORIDE 25 MG: 25 CAPSULE ORAL at 08:52

## 2023-11-07 RX ADMIN — IRON SUCROSE 300 MG: 20 INJECTION, SOLUTION INTRAVENOUS at 09:46

## 2023-11-14 ENCOUNTER — HOSPITAL ENCOUNTER (OUTPATIENT)
Dept: INFUSION THERAPY | Facility: HOSPITAL | Age: 33
Discharge: HOME | End: 2023-11-14
Admitting: OBSTETRICS & GYNECOLOGY
Payer: COMMERCIAL

## 2023-11-14 VITALS
DIASTOLIC BLOOD PRESSURE: 52 MMHG | RESPIRATION RATE: 20 BRPM | SYSTOLIC BLOOD PRESSURE: 106 MMHG | TEMPERATURE: 97.8 F | HEART RATE: 63 BPM | OXYGEN SATURATION: 100 %

## 2023-11-14 DIAGNOSIS — O99.019 ANTEPARTUM ANEMIA: Primary | ICD-10-CM

## 2023-11-14 PROCEDURE — 96366 THER/PROPH/DIAG IV INF ADDON: CPT

## 2023-11-14 PROCEDURE — 25810000003 SODIUM CHLORIDE 0.9 % SOLUTION 250 ML FLEX CONT: Performed by: OBSTETRICS & GYNECOLOGY

## 2023-11-14 PROCEDURE — 96365 THER/PROPH/DIAG IV INF INIT: CPT

## 2023-11-14 PROCEDURE — 63710000001 DIPHENHYDRAMINE PER 50 MG: Performed by: OBSTETRICS & GYNECOLOGY

## 2023-11-14 PROCEDURE — 25010000002 IRON SUCROSE PER 1 MG: Performed by: OBSTETRICS & GYNECOLOGY

## 2023-11-14 RX ORDER — ESCITALOPRAM OXALATE 5 MG/1
1 TABLET ORAL DAILY
COMMUNITY
Start: 2023-10-31

## 2023-11-14 RX ORDER — ACETAMINOPHEN 325 MG/1
650 TABLET ORAL ONCE
Status: COMPLETED | OUTPATIENT
Start: 2023-11-14 | End: 2023-11-14

## 2023-11-14 RX ORDER — DIPHENHYDRAMINE HCL 25 MG
25 CAPSULE ORAL ONCE
Status: CANCELLED | OUTPATIENT
Start: 2023-11-14

## 2023-11-14 RX ORDER — HUMAN INSULIN 100 [IU]/ML
16 INJECTION, SUSPENSION SUBCUTANEOUS NIGHTLY
COMMUNITY
Start: 2023-11-06

## 2023-11-14 RX ORDER — DIPHENHYDRAMINE HCL 25 MG
25 CAPSULE ORAL ONCE
Status: COMPLETED | OUTPATIENT
Start: 2023-11-14 | End: 2023-11-14

## 2023-11-14 RX ORDER — ACETAMINOPHEN 325 MG/1
650 TABLET ORAL ONCE
Status: CANCELLED | OUTPATIENT
Start: 2023-11-14

## 2023-11-14 RX ADMIN — IRON SUCROSE 300 MG: 20 INJECTION, SOLUTION INTRAVENOUS at 09:50

## 2023-11-14 RX ADMIN — ACETAMINOPHEN 650 MG: 325 TABLET, FILM COATED ORAL at 08:57

## 2023-11-14 RX ADMIN — DIPHENHYDRAMINE HYDROCHLORIDE 25 MG: 25 CAPSULE ORAL at 08:57

## 2023-11-14 NOTE — PROGRESS NOTES
Pt lying on stretecher postioned to l side with pillow or comfort.   Pt tolerated infusion well and monitored for 30 minutes post infusion without complication. Pt dc'ed ambulatory.

## 2023-11-25 ENCOUNTER — HOSPITAL ENCOUNTER (EMERGENCY)
Facility: HOSPITAL | Age: 33
Discharge: HOME | End: 2023-11-25
Attending: OBSTETRICS & GYNECOLOGY | Admitting: OBSTETRICS & GYNECOLOGY
Payer: COMMERCIAL

## 2023-11-25 VITALS
HEART RATE: 75 BPM | SYSTOLIC BLOOD PRESSURE: 115 MMHG | DIASTOLIC BLOOD PRESSURE: 64 MMHG | OXYGEN SATURATION: 97 % | RESPIRATION RATE: 16 BRPM | TEMPERATURE: 98.3 F

## 2023-11-25 LAB
BACTERIA UR QL AUTO: ABNORMAL /HPF
BILIRUB UR QL STRIP: NEGATIVE
CLARITY UR: CLEAR
COLOR UR: YELLOW
GLUCOSE UR STRIP-MCNC: NEGATIVE MG/DL
HGB UR QL STRIP.AUTO: NEGATIVE
HYALINE CASTS UR QL AUTO: ABNORMAL /LPF
KETONES UR QL STRIP: NEGATIVE
LEUKOCYTE ESTERASE UR QL STRIP.AUTO: ABNORMAL
NITRITE UR QL STRIP: NEGATIVE
PH UR STRIP.AUTO: 7 [PH] (ref 5–8)
PROT UR QL STRIP: NEGATIVE
RBC # UR STRIP: ABNORMAL /HPF
REF LAB TEST METHOD: ABNORMAL
SP GR UR STRIP: 1.01 (ref 1–1.03)
SQUAMOUS #/AREA URNS HPF: ABNORMAL /HPF
UROBILINOGEN UR QL STRIP: ABNORMAL
WBC # UR STRIP: ABNORMAL /HPF

## 2023-11-25 PROCEDURE — 87086 URINE CULTURE/COLONY COUNT: CPT | Performed by: OBSTETRICS & GYNECOLOGY

## 2023-11-25 PROCEDURE — 99284 EMERGENCY DEPT VISIT MOD MDM: CPT | Performed by: OBSTETRICS & GYNECOLOGY

## 2023-11-25 PROCEDURE — 59025 FETAL NON-STRESS TEST: CPT

## 2023-11-25 PROCEDURE — 81001 URINALYSIS AUTO W/SCOPE: CPT | Performed by: OBSTETRICS & GYNECOLOGY

## 2023-11-25 NOTE — LETTER
November 25, 2023     Patient: Cydney Soto   YOB: 1990   Date of Visit: 11/25/2023       To Whom It May Concern:    Please excuse Cydney Soto from work 11-25-23.  She was being seen in [facility] regarding her pregnancy.           Sincerely,          Loyda MONSON   Labor & Delivery RN

## 2023-11-25 NOTE — OBED NOTES
[facility] Note OB        Patient Name: Cydney Soto  YOB: 1990  MRN: [id number]  Admission Date: 2023 11:23 AM  Date of Service: 2023    Chief Complaint: Abdominal Cramping (Pt presents to [facility] with abdominal cramping that began at 0830 while she was at work. States she was folding clothes. Has not felt baby move since cramping began. Denies LOF, VB. Placed on monitor VSS.)        Subjective     Cydney Soto is a 33 y.o. female  at 35w1d with Estimated Date of Delivery: 23 who presents with the chief complaint listed above.  She sees Angelina Pendleton MD for her prenatal care. Her pregnancy has been complicated by:   cervical insufficiency with current cerclage in place, anemia, migraines, depression, anxiety .        She describes fetal movement as decreased.  She denies rupture of membranes.  She denies vaginal bleeding. She is feeling contractions.          Objective   Patient Active Problem List    Diagnosis     History of cervical incompetence [Z87.42]     Pregnancy [Z34.90]     Complete  [O03.9]     Iron deficiency anemia [D50.9]     Anemia of pregnancy [O99.019]     Cigarette smoker [F17.210]         OB History    Para Term  AB Living   3 0 0 0 2 0   SAB IAB Ectopic Molar Multiple Live Births   1 0 0 0 0 0      # Outcome Date GA Lbr Eric/2nd Weight Sex Delivery Anes PTL Lv   3 Current            2 AB 2022 15w0d          1 SAB  4w0d               Past Medical History:   Diagnosis Date    Anemia     Anxiety     Bilateral bunions     Chronic mental illness     Depression and anxiety    Depression     Depressive disorder 2017    Gastroesophageal reflux disease 2020    GERD (gastroesophageal reflux disease)     Gestational diabetes     Migraine     Pilonidal cyst     SCHEDULED FOR SX       Past Surgical History:   Procedure Laterality Date    CERVICAL CERCLAGE N/A 2023    Procedure: CERVICAL CERCLAGE;  Surgeon:  Nicolle Colorado MD;  Location:  [facility]OR DELIVERY;  Service: Obstetrics;  Laterality: N/A;    HAND LACERATION REPAIR      [age] YEAR OLD    PILONIDAL CYSTECTOMY N/A 7/17/2020    Procedure: PILONIDAL CYSTECTOMY;  Surgeon: Bk Martini MD;  Location:  [facility] OR;  Service: General;  Laterality: N/A;    TONSILLECTOMY      WISDOM TOOTH EXTRACTION         No current facility-administered medications on file prior to encounter.     Current Outpatient Medications on File Prior to Encounter   Medication Sig Dispense Refill    acetaminophen (TYLENOL) 650 MG 8 hr tablet Take 1 tablet by mouth Every 8 (Eight) Hours As Needed for Mild Pain.      escitalopram (LEXAPRO) 5 MG tablet Take 1 tablet by mouth Daily.      ferrous gluconate (FERGON) 324 MG tablet Take 1 tablet by mouth 2 (Two) Times a Day. (Patient not taking: Reported on 11/14/2023) 60 tablet 2    NovoLIN N FlexPen 100 UNIT/ML injection Inject 16 Units under the skin into the appropriate area as directed Every Night.      prenatal vitamin (prenatal, CLASSIC, vitamin) tablet Take  by mouth Daily.      Progesterone (PROMETRIUM) 200 MG capsule Insert 1 capsule into the vagina Every Night. 30 capsule 5       Allergies   Allergen Reactions    Penicillins Other (See Comments)     UNSURE OF REACTION, can tolerate Amoxicillin    Propranolol Anaphylaxis    Shrimp Other (See Comments)     Tongue itch       Family History   Problem Relation Age of Onset    Depression Mother     Alcohol abuse Father     Learning disabilities Sister     Depression Maternal Grandmother     Heart disease Maternal Grandfather     Breast cancer Neg Hx     Ovarian cancer Neg Hx     Colon cancer Neg Hx     Deep vein thrombosis Neg Hx     Pulmonary embolism Neg Hx     Birth defects Neg Hx     Mental retardation Neg Hx        Social History     Socioeconomic History    Marital status:    Tobacco Use    Smoking status: Former     Packs/day: 1.00     Years: 15.00     Additional pack years: 0.00      Total pack years: 15.00     Types: Cigarettes     Passive exposure: Never    Smokeless tobacco: Never   Vaping Use    Vaping Use: Every day    Substances: Nicotine, Flavoring    Devices: Disposable   Substance and Sexual Activity    Alcohol use: Not Currently     Comment: monthly 1-2 DRINKS MAYBE MONTHLY    Drug use: No    Sexual activity: Yes     Partners: Male     Birth control/protection: None           Review of Systems   Constitutional:  Negative for chills, fatigue and fever.   HENT:  Negative for congestion, rhinorrhea and sore throat.    Eyes:  Negative for visual disturbance.   Respiratory: Negative.     Cardiovascular: Negative.    Gastrointestinal:  Positive for abdominal pain. Negative for constipation, diarrhea, nausea and vomiting.   Genitourinary:  Negative for difficulty urinating, dyspareunia, dysuria, flank pain, frequency, genital sores, hematuria, pelvic pain, urgency, vaginal bleeding, vaginal discharge and vaginal pain.   Neurological:  Negative for dizziness, seizures, light-headedness and headaches.   Psychiatric/Behavioral:  Negative for sleep disturbance. The patient is not nervous/anxious.           PHYSICAL EXAM:      VITAL SIGNS:  Vitals:    11/25/23 1147   BP: 115/64   BP Location: Left arm   Patient Position: Lying   Pulse: 75   Resp: 16   Temp: 98.3 °F (36.8 °C)   TempSrc: Oral   SpO2: 97%        FHT'S:                   Baseline:  120 BPM  Variability:  Moderate = 6 - 25 BPM  Accelerations:  15 x 15 accelerations present     Decelerations:  absent  Contractions:   absent     Interpretation:    Reactive NST, CAT 1 tracing        PHYSICAL EXAM:    General: well developed; well nourished  no acute distress   Heart: Not performed.   Lungs  : breathing is unlabored     Abdomen: soft, non-tender; no masses  no umbilical or inguinal hernias are present  no hepato-splenomegaly       Cervix: was checked (by me): 0 cm / 1 % / -3      Contractions: none        Extremities: peripheral pulses  "normal, no pedal edema, no clubbing or cyanosis      LABS AND TESTING ORDERED:  Uterine and fetal monitoring  Urinalysis      LAB RESULTS:    No results found for this or any previous visit (from the past 24 hour(s)).    Lab Results   Component Value Date    ABO B 2023    RH Positive 2023       No results found for: \"STREPGPB\"              Assessment & Plan     ASSESSMENT/PLAN:  Cydney Soto is a 33 y.o. female  at 35w1d who presented with: cramping with a cerclage in place.  Patient's cramping resolved during her triage visit and she began to feel very active fetal movement.  Her cervix was checked and is closed with cerclage in place.  She was reassured there are no signs of labor or cerclage failing.  She was given a work note and encouraged to follow up in office next week.  Return precautions given and patient discharged.          Final Impression:  Pregnancy at 35w1d  Reactive NST.  CAT 1 tracing  Maternal vital signs were reviewed and were unremarkable              Vitals:    23 1147   BP: 115/64   BP Location: Left arm   Patient Position: Lying   Pulse: 75   Resp: 16   Temp: 98.3 °F (36.8 °C)   TempSrc: Oral   SpO2: 97%       No results found for: \"STREPGPB\"  Lab Results   Component Value Date    ABO B 2023    RH Positive 2023     COVID - 19 status unknown      PLAN:       I have spent 30 minutes including face to face time with the patient, greater than 50% in discussion of the diagnosis (counseling) and/or coordination of care.     Li Holder MD  2023  12:01 EST  OB Hospitalist  Phone:  [phone]  "

## 2023-11-26 LAB — BACTERIA SPEC AEROBE CULT: NORMAL

## 2023-11-30 LAB — EXTERNAL GROUP B STREP ANTIGEN: POSITIVE

## 2023-12-08 ENCOUNTER — HOSPITAL ENCOUNTER (EMERGENCY)
Facility: HOSPITAL | Age: 33
Discharge: HOME | End: 2023-12-09
Attending: OBSTETRICS & GYNECOLOGY | Admitting: OBSTETRICS & GYNECOLOGY
Payer: COMMERCIAL

## 2023-12-08 PROCEDURE — 87086 URINE CULTURE/COLONY COUNT: CPT | Performed by: OBSTETRICS & GYNECOLOGY

## 2023-12-08 PROCEDURE — 81003 URINALYSIS AUTO W/O SCOPE: CPT | Performed by: OBSTETRICS & GYNECOLOGY

## 2023-12-08 PROCEDURE — 99284 EMERGENCY DEPT VISIT MOD MDM: CPT | Performed by: OBSTETRICS & GYNECOLOGY

## 2023-12-09 VITALS
TEMPERATURE: 97.8 F | RESPIRATION RATE: 18 BRPM | OXYGEN SATURATION: 100 % | HEART RATE: 75 BPM | SYSTOLIC BLOOD PRESSURE: 112 MMHG | WEIGHT: 182.4 LBS | BODY MASS INDEX: 30.83 KG/M2 | DIASTOLIC BLOOD PRESSURE: 65 MMHG

## 2023-12-09 LAB
BILIRUB UR QL STRIP: NEGATIVE
CLARITY UR: CLEAR
COLOR UR: YELLOW
GLUCOSE BLDC GLUCOMTR-MCNC: 207 MG/DL (ref 70–130)
GLUCOSE UR STRIP-MCNC: ABNORMAL MG/DL
HGB UR QL STRIP.AUTO: NEGATIVE
KETONES UR QL STRIP: ABNORMAL
LEUKOCYTE ESTERASE UR QL STRIP.AUTO: NEGATIVE
NITRITE UR QL STRIP: NEGATIVE
PH UR STRIP.AUTO: 6 [PH] (ref 5–8)
PROT UR QL STRIP: ABNORMAL
SP GR UR STRIP: >=1.03 (ref 1–1.03)
UROBILINOGEN UR QL STRIP: ABNORMAL

## 2023-12-09 PROCEDURE — 82948 REAGENT STRIP/BLOOD GLUCOSE: CPT

## 2023-12-09 PROCEDURE — 63710000001 PROMETHAZINE PER 25 MG: Performed by: OBSTETRICS & GYNECOLOGY

## 2023-12-09 PROCEDURE — 59025 FETAL NON-STRESS TEST: CPT

## 2023-12-09 RX ORDER — CAFFEINE CITRATE 20 MG/ML
50 SOLUTION ORAL ONCE
Status: COMPLETED | OUTPATIENT
Start: 2023-12-09 | End: 2023-12-09

## 2023-12-09 RX ORDER — BLOOD SUGAR DIAGNOSTIC
1 STRIP MISCELLANEOUS AS NEEDED
COMMUNITY
Start: 2023-11-10

## 2023-12-09 RX ORDER — PROMETHAZINE HYDROCHLORIDE 25 MG/1
25 TABLET ORAL ONCE
Status: COMPLETED | OUTPATIENT
Start: 2023-12-09 | End: 2023-12-09

## 2023-12-09 RX ADMIN — PROMETHAZINE HYDROCHLORIDE 25 MG: 25 TABLET ORAL at 01:01

## 2023-12-09 RX ADMIN — CAFFEINE CITRATE 50 MG: 20 SOLUTION ORAL at 01:00

## 2023-12-09 NOTE — OBED NOTES
[facility]  Cydney Soto  : 1990  MRN: [id number]  CSN: [id number]    OB ED Provider Note    Subjective   Chief Complaint   Patient presents with    Headache     Patient states HA started this AM. Patient rating pain 5/10 that was not relieved with Tyelenol. No labor complaints. Patient reports active fetal movement     Cydney Soto is a 33 y.o. year old  with an Estimated Date of Delivery: 23 currently at 37w1d presenting with frontal HA, preceded by atypical aura since 1000 on . The HA is accompanied by nausea, photophobia and sound sensitivity. She has had limited response to 1 gm of Tylenol 2 hours PTA. She denies CTX, ROM or VB. FM is present.    Prenatal care has been with Dr. Pendleton.  It has been complicated by cervical incompetence, s/p cerclage, gestational DM on insulin, anemia, migraine HA, depression and anxiety .    OB History    Para Term  AB Living   3 0 0 0 2 0   SAB IAB Ectopic Molar Multiple Live Births   1 0 0 0 0 0      # Outcome Date GA Lbr Eric/2nd Weight Sex Delivery Anes PTL Lv   3 Current            2 AB 2022 15w0d          1 SAB  4w0d            Past Medical History:   Diagnosis Date    Depressive disorder 2017    Gastroesophageal reflux disease 2020    Anemia     Anxiety     Bilateral bunions     Chronic mental illness     Depression and anxiety    Depression     GERD (gastroesophageal reflux disease)     Gestational diabetes     Migraine     Pilonidal cyst     SCHEDULED FOR SX     Past Surgical History:   Procedure Laterality Date    PILONIDAL CYSTECTOMY N/A 2020    Procedure: PILONIDAL CYSTECTOMY;  Surgeon: Bk Martini MD;  Location:  [facility];  Service: General;  Laterality: N/A;    CERVICAL CERCLAGE N/A 2023    Procedure: CERVICAL CERCLAGE;  Surgeon: Nicolle Colorado MD;  Location:  [facility] LABOR DELIVERY;  Service: Obstetrics;  Laterality: N/A;    HAND LACERATION REPAIR      [age] YEAR OLD     TONSILLECTOMY      WISDOM TOOTH EXTRACTION       No current facility-administered medications for this encounter.    Allergies   Allergen Reactions    Penicillins Other (See Comments) and Hives     UNSURE OF REACTION, can tolerate Amoxicillin    Propranolol Anaphylaxis    Shrimp Other (See Comments)     Tongue itch     Social History    Tobacco Use      Smoking status: Former        Packs/day: 1.00        Years: 15.00        Additional pack years: 0.00        Total pack years: 15.00        Types: Cigarettes        Passive exposure: Never      Smokeless tobacco: Never    Review of Systems   Eyes:  Positive for photophobia and visual disturbance.   Gastrointestinal:  Positive for nausea.   Neurological:  Positive for headaches.         Objective   /66   Pulse 87   Temp 97.8 °F (36.6 °C) (Oral)   Resp 18   Wt 82.7 kg (182 lb 6.4 oz)   SpO2 97%   BMI 30.83 kg/m²   General: well developed; well nourished  mildly distressed   Abdomen: soft, non-tender; no masses  gravid    FHT's: reactive and category 1      Cervix: was not checked.   Presentation: breech   Contractions: none   Chest: Unlabored respirations    CV:  RRR   Ext:   No C/C/E   Back: CVA tenderness is deferred bilateral        Prenatal Labs  Lab Results   Component Value Date    HGB 10.0 (L) 06/16/2023    RUBELLAABIGG 3.71 06/15/2022    HEPBSAG Negative 06/15/2022    ABSCRN Negative 06/16/2023    MGI3LXA3 Non Reactive 06/15/2022    HEPCVIRUSABY <0.1 06/15/2022    GGTFASTING 84 06/30/2022    YFZ4ISCS 190 (H) 06/30/2022    IXC3OZKK 112 06/30/2022    URINECX >100,000 CFU/mL Mixed Kim Isolated 11/25/2023    CHLAMNAA Negative 06/15/2022    NGONORRHON Negative 06/15/2022       Current Labs Reviewed   UA:    Lab Results   Component Value Date    SQUAMEPIUA 13-20 (A) 11/25/2023    SPECGRAVUR >=1.030 12/08/2023    KETONESU Trace (A) 12/08/2023    BLOODU Negative 12/08/2023    LEUKOCYTESUR Negative 12/08/2023    NITRITEU Negative 12/08/2023    RBCUA None  Seen 11/25/2023    WBCUA 0-2 11/25/2023    BACTERIA 1+ (A) 11/25/2023     Accucheck 207     Assessment   IUP at 37w1d  Migraine HA- improved to 2/10 after phenergan and caffeine.  Normotensive, so HA not of preeclamptic origin.  Hyperglycemia- Ms. Soto reports high carb dinner.  BG have been under control per her report     Plan   D/C home with instructions to return for worsening symptoms, acute changes.  Keep regularly scheduled prenatal appointments. She is to maintain her glucose log for Dr. Pendleton so that insulin can be adjusted as needed.      Yash Johnson MD  12/9/2023  00:34 EST

## 2023-12-10 LAB — BACTERIA SPEC AEROBE CULT: NORMAL

## 2023-12-11 ENCOUNTER — ANESTHESIA (OUTPATIENT)
Dept: LABOR AND DELIVERY | Facility: HOSPITAL | Age: 33
DRG: 819 | End: 2023-12-11
Payer: COMMERCIAL

## 2023-12-11 ENCOUNTER — HOSPITAL ENCOUNTER (INPATIENT)
Facility: HOSPITAL | Age: 33
LOS: 1 days | Discharge: HOME | DRG: 819 | End: 2023-12-11
Attending: OBSTETRICS & GYNECOLOGY | Admitting: OBSTETRICS & GYNECOLOGY
Payer: COMMERCIAL

## 2023-12-11 VITALS
HEART RATE: 76 BPM | SYSTOLIC BLOOD PRESSURE: 121 MMHG | BODY MASS INDEX: 30.42 KG/M2 | WEIGHT: 180 LBS | DIASTOLIC BLOOD PRESSURE: 64 MMHG | RESPIRATION RATE: 18 BRPM | TEMPERATURE: 97.9 F

## 2023-12-11 LAB
ABO GROUP BLD: NORMAL
BASOPHILS # BLD AUTO: 0.02 10*3/MM3 (ref 0–0.2)
BASOPHILS NFR BLD AUTO: 0.2 % (ref 0–1.5)
BLD GP AB SCN SERPL QL: NEGATIVE
DEPRECATED RDW RBC AUTO: 47.1 FL (ref 37–54)
EOSINOPHIL # BLD AUTO: 0.06 10*3/MM3 (ref 0–0.4)
EOSINOPHIL NFR BLD AUTO: 0.7 % (ref 0.3–6.2)
ERYTHROCYTE [DISTWIDTH] IN BLOOD BY AUTOMATED COUNT: 14.4 % (ref 12.3–15.4)
GLUCOSE SERPL-MCNC: 98 MG/DL (ref 65–99)
HCT VFR BLD AUTO: 32 % (ref 34–46.6)
HGB BLD-MCNC: 10.8 G/DL (ref 12–15.9)
IMM GRANULOCYTES # BLD AUTO: 0.09 10*3/MM3 (ref 0–0.05)
IMM GRANULOCYTES NFR BLD AUTO: 1 % (ref 0–0.5)
LYMPHOCYTES # BLD AUTO: 1.37 10*3/MM3 (ref 0.7–3.1)
LYMPHOCYTES NFR BLD AUTO: 15.4 % (ref 19.6–45.3)
MCH RBC QN AUTO: 30.4 PG (ref 26.6–33)
MCHC RBC AUTO-ENTMCNC: 33.8 G/DL (ref 31.5–35.7)
MCV RBC AUTO: 90.1 FL (ref 79–97)
MONOCYTES # BLD AUTO: 0.62 10*3/MM3 (ref 0.1–0.9)
MONOCYTES NFR BLD AUTO: 7 % (ref 5–12)
NEUTROPHILS NFR BLD AUTO: 6.76 10*3/MM3 (ref 1.7–7)
NEUTROPHILS NFR BLD AUTO: 75.7 % (ref 42.7–76)
NRBC BLD AUTO-RTO: 0 /100 WBC (ref 0–0.2)
PLATELET # BLD AUTO: 208 10*3/MM3 (ref 140–450)
PMV BLD AUTO: 9.6 FL (ref 6–12)
RBC # BLD AUTO: 3.55 10*6/MM3 (ref 3.77–5.28)
RH BLD: POSITIVE
T&S EXPIRATION DATE: NORMAL
WBC NRBC COR # BLD AUTO: 8.92 10*3/MM3 (ref 3.4–10.8)

## 2023-12-11 PROCEDURE — 86900 BLOOD TYPING SEROLOGIC ABO: CPT | Performed by: OBSTETRICS & GYNECOLOGY

## 2023-12-11 PROCEDURE — 25010000002 MORPHINE PER 10 MG: Performed by: OBSTETRICS & GYNECOLOGY

## 2023-12-11 PROCEDURE — 59025 FETAL NON-STRESS TEST: CPT

## 2023-12-11 PROCEDURE — 96374 THER/PROPH/DIAG INJ IV PUSH: CPT

## 2023-12-11 PROCEDURE — 96361 HYDRATE IV INFUSION ADD-ON: CPT

## 2023-12-11 PROCEDURE — 86850 RBC ANTIBODY SCREEN: CPT | Performed by: OBSTETRICS & GYNECOLOGY

## 2023-12-11 PROCEDURE — 59412 ANTEPARTUM MANIPULATION: CPT

## 2023-12-11 PROCEDURE — 25810000003 LACTATED RINGERS PER 1000 ML: Performed by: OBSTETRICS & GYNECOLOGY

## 2023-12-11 PROCEDURE — 86901 BLOOD TYPING SEROLOGIC RH(D): CPT | Performed by: OBSTETRICS & GYNECOLOGY

## 2023-12-11 PROCEDURE — 0UCC7ZZ EXTIRPATION OF MATTER FROM CERVIX, VIA NATURAL OR ARTIFICIAL OPENING: ICD-10-PCS | Performed by: OBSTETRICS & GYNECOLOGY

## 2023-12-11 PROCEDURE — 25010000002 TERBUTALINE PER 1 MG: Performed by: OBSTETRICS & GYNECOLOGY

## 2023-12-11 PROCEDURE — 96372 THER/PROPH/DIAG INJ SC/IM: CPT

## 2023-12-11 PROCEDURE — 82947 ASSAY GLUCOSE BLOOD QUANT: CPT | Performed by: OBSTETRICS & GYNECOLOGY

## 2023-12-11 PROCEDURE — 85025 COMPLETE CBC W/AUTO DIFF WBC: CPT | Performed by: OBSTETRICS & GYNECOLOGY

## 2023-12-11 RX ORDER — SODIUM CHLORIDE, SODIUM LACTATE, POTASSIUM CHLORIDE, CALCIUM CHLORIDE 600; 310; 30; 20 MG/100ML; MG/100ML; MG/100ML; MG/100ML
125 INJECTION, SOLUTION INTRAVENOUS CONTINUOUS
Status: DISCONTINUED | OUTPATIENT
Start: 2023-12-11 | End: 2023-12-11 | Stop reason: HOSPADM

## 2023-12-11 RX ORDER — MISOPROSTOL 200 UG/1
800 TABLET ORAL ONCE AS NEEDED
Status: CANCELLED | OUTPATIENT
Start: 2023-12-11

## 2023-12-11 RX ORDER — SODIUM CHLORIDE 0.9 % (FLUSH) 0.9 %
10 SYRINGE (ML) INJECTION EVERY 12 HOURS SCHEDULED
Status: DISCONTINUED | OUTPATIENT
Start: 2023-12-11 | End: 2023-12-11 | Stop reason: HOSPADM

## 2023-12-11 RX ORDER — SODIUM CHLORIDE 9 MG/ML
40 INJECTION, SOLUTION INTRAVENOUS AS NEEDED
Status: DISCONTINUED | OUTPATIENT
Start: 2023-12-11 | End: 2023-12-11 | Stop reason: HOSPADM

## 2023-12-11 RX ORDER — CARBOPROST TROMETHAMINE 250 UG/ML
250 INJECTION, SOLUTION INTRAMUSCULAR
Status: CANCELLED | OUTPATIENT
Start: 2023-12-11

## 2023-12-11 RX ORDER — ONDANSETRON 4 MG/1
4 TABLET, FILM COATED ORAL EVERY 6 HOURS PRN
Status: DISCONTINUED | OUTPATIENT
Start: 2023-12-11 | End: 2023-12-11 | Stop reason: HOSPADM

## 2023-12-11 RX ORDER — MAGNESIUM CARB/ALUMINUM HYDROX 105-160MG
30 TABLET,CHEWABLE ORAL ONCE AS NEEDED
Status: DISCONTINUED | OUTPATIENT
Start: 2023-12-11 | End: 2023-12-11 | Stop reason: HOSPADM

## 2023-12-11 RX ORDER — TERBUTALINE SULFATE 1 MG/ML
0.25 INJECTION, SOLUTION SUBCUTANEOUS AS NEEDED
Status: DISCONTINUED | OUTPATIENT
Start: 2023-12-11 | End: 2023-12-11 | Stop reason: HOSPADM

## 2023-12-11 RX ORDER — ACETAMINOPHEN 325 MG/1
650 TABLET ORAL EVERY 4 HOURS PRN
Status: DISCONTINUED | OUTPATIENT
Start: 2023-12-11 | End: 2023-12-11 | Stop reason: HOSPADM

## 2023-12-11 RX ORDER — NALOXONE HCL 0.4 MG/ML
0.4 VIAL (ML) INJECTION
Status: DISCONTINUED | OUTPATIENT
Start: 2023-12-11 | End: 2023-12-11 | Stop reason: HOSPADM

## 2023-12-11 RX ORDER — OXYTOCIN/0.9 % SODIUM CHLORIDE 30/500 ML
250 PLASTIC BAG, INJECTION (ML) INTRAVENOUS CONTINUOUS
Status: CANCELLED | OUTPATIENT
Start: 2023-12-11 | End: 2023-12-11

## 2023-12-11 RX ORDER — FAMOTIDINE 20 MG/1
20 TABLET, FILM COATED ORAL 2 TIMES DAILY PRN
Status: DISCONTINUED | OUTPATIENT
Start: 2023-12-11 | End: 2023-12-11 | Stop reason: HOSPADM

## 2023-12-11 RX ORDER — DIPHENHYDRAMINE HCL 25 MG
25 CAPSULE ORAL EVERY 6 HOURS PRN
COMMUNITY

## 2023-12-11 RX ORDER — OXYTOCIN/0.9 % SODIUM CHLORIDE 30/500 ML
999 PLASTIC BAG, INJECTION (ML) INTRAVENOUS ONCE
Status: CANCELLED | OUTPATIENT
Start: 2023-12-11 | End: 2023-12-11

## 2023-12-11 RX ORDER — TRANEXAMIC ACID 10 MG/ML
1000 INJECTION, SOLUTION INTRAVENOUS ONCE AS NEEDED
Status: CANCELLED | OUTPATIENT
Start: 2023-12-11

## 2023-12-11 RX ORDER — METHYLERGONOVINE MALEATE 0.2 MG/ML
200 INJECTION INTRAVENOUS ONCE AS NEEDED
Status: CANCELLED | OUTPATIENT
Start: 2023-12-11

## 2023-12-11 RX ORDER — ONDANSETRON 2 MG/ML
4 INJECTION INTRAMUSCULAR; INTRAVENOUS EVERY 6 HOURS PRN
Status: DISCONTINUED | OUTPATIENT
Start: 2023-12-11 | End: 2023-12-11 | Stop reason: HOSPADM

## 2023-12-11 RX ORDER — SODIUM CHLORIDE 0.9 % (FLUSH) 0.9 %
10 SYRINGE (ML) INJECTION AS NEEDED
Status: DISCONTINUED | OUTPATIENT
Start: 2023-12-11 | End: 2023-12-11 | Stop reason: HOSPADM

## 2023-12-11 RX ORDER — MORPHINE SULFATE 2 MG/ML
2 INJECTION, SOLUTION INTRAMUSCULAR; INTRAVENOUS EVERY 4 HOURS PRN
Status: DISCONTINUED | OUTPATIENT
Start: 2023-12-11 | End: 2023-12-11 | Stop reason: HOSPADM

## 2023-12-11 RX ORDER — FAMOTIDINE 10 MG/ML
20 INJECTION, SOLUTION INTRAVENOUS 2 TIMES DAILY PRN
Status: DISCONTINUED | OUTPATIENT
Start: 2023-12-11 | End: 2023-12-11 | Stop reason: HOSPADM

## 2023-12-11 RX ADMIN — SODIUM CHLORIDE, POTASSIUM CHLORIDE, SODIUM LACTATE AND CALCIUM CHLORIDE 125 ML/HR: 600; 310; 30; 20 INJECTION, SOLUTION INTRAVENOUS at 06:44

## 2023-12-11 RX ADMIN — MORPHINE SULFATE 2 MG: 2 INJECTION, SOLUTION INTRAMUSCULAR; INTRAVENOUS at 09:04

## 2023-12-11 RX ADMIN — TERBUTALINE SULFATE 0.25 MG: 1 INJECTION, SOLUTION SUBCUTANEOUS at 09:04

## 2023-12-11 RX ADMIN — ACETAMINOPHEN 650 MG: 325 TABLET, FILM COATED ORAL at 09:54

## 2023-12-11 NOTE — H&P
[facility]  Obstetric History and Physical    Patient Name: Cydney Soto  :  1990  MRN:  [id number]      Chief Complaint   Patient presents with    External Cephalic Version       Subjective     Patient is a 33 y.o. female  currently at 37w3d, who presents for attempt at external cephalic version and removal of cerclage. Her pregnancy is complicated by A2GDM on novolin 16U qhs, prior 15wk loss due to cervical insufficiency, s/p prophylactic cerclage this pregnancy, anemia s/p IV iron, SUA.     Most recent growth u/s : EFW 57%, posterior placenta, ANDRADE 22, breech.    Past Medical History:   Diagnosis Date    Anemia     Anxiety     Bilateral bunions     Chronic mental illness     Depression and anxiety    Depression     Depressive disorder 2017    Gastroesophageal reflux disease 2020    GERD (gastroesophageal reflux disease)     Gestational diabetes     Migraine     Pilonidal cyst     SCHEDULED FOR SX     Past Surgical History:   Procedure Laterality Date    CERVICAL CERCLAGE N/A 2023    Procedure: CERVICAL CERCLAGE;  Surgeon: Nicolle Colorado MD;  Location:  [facility]IV[facility];  Service: Obstetrics;  Laterality: N/A;    HAND LACERATION REPAIR      [age] YEAR OLD    PILONIDAL CYSTECTOMY N/A 2020    Procedure: PILONIDAL CYSTECTOMY;  Surgeon: Bk Martini MD;  Location: [facility];  Service: General;  Laterality: N/A;    TONSILLECTOMY      WISDOM TOOTH EXTRACTION       Penicillins, Propranolol, and Shrimp      Objective       Vital Signs Range for the last 24 hours  Temperature: Temp:  [97.9 °F (36.6 °C)] 97.9 °F (36.6 °C)   Temp Source: Temp src: Oral   BP: BP: (116)/(74) 116/74   Pulse: Heart Rate:  [70] 70   Respirations: Resp:  [18] 18                   Physical Examination:     General :  Alert in NAD  Abdomen: Gravid, soft                               Fetal Heart Rate Assessment   Method: Fetal HR Assessment Method: external   Beats/min: Fetal HR (beats/min):  130   Baseline: Fetal HR Baseline: normal range   Varibility: Fetal HR Variability: moderate (amplitude range 6 to 25 bpm)   Accels: Fetal HR Accelerations: absent   Decels: Fetal HR Decelerations: absent   Tracing Category:       Uterine Assessment   Method:     Frequency (min): Contraction Frequency (Minutes): x2   Ctx Count in 10 min:     Duration:     Intensity: Contraction Intensity: no contractions   Intensity by IUPC:     Resting Tone: Uterine Resting Tone: soft by palpation   Resting Tone by IUPC:     [place] Units:           Results from last 7 days   Lab Units 12/11/23  0641   WBC 10*3/mm3 8.92   HEMOGLOBIN g/dL 10.8*   HEMATOCRIT % 32.0*   PLATELETS 10*3/mm3 208       Assessment & Plan     1.  Intrauterine pregnancy at 37w3d weeks gestation for attempt at ECV and cerclage removal.   reactive fetal status.   Plan of care has been reviewed with patient and family.  All questions answered.      Encounter for maternal care for breech presentation in gutierrez pregnancy        H&P updated. The patient was examined and the following changes are noted above.         Angelina Pendleton MD  12/11/2023  08:07 EST

## 2023-12-11 NOTE — PROCEDURES
Pt given morphine and terbutaline and laid flat with bedpan under hips. Ultrasound performed - fetal head midline/right, back to maternal left.  Dr. Holder called to assist. Attempted forward roll x2 without success. FHT noted to be in 60s with slow return to baseline so procedure aborted at this time.  FHT did recover well and remained with normal baseline and good variability.  Pt then placed in stirrups.  Speculum placed and cerclage knot noted. Difficult to visualize stitch itself and with manipulation of stitch moderate bleeding noted. Dr. Holder called back to assist and eventually was able to manipulate cervix enough to cut stitch which was removed in entirety.  Bleeding had significantly improved by this point. Will monitor FHT and bleeding for 4 hours post-procedure and d/c home if reasonable at that point. Discussed changing IOL already scheduled to a c/s.  Labor precautions reviewed at length.    Angelina Pendleton MD

## 2023-12-11 NOTE — NON STRESS TEST
Cydney Soto, a  at 37w3d with an MEIR of 2023, Date entered prior to episode creation, was seen at [facility] for a nonstress test.    Chief Complaint   Patient presents with    External Cephalic Version       Patient Active Problem List   Diagnosis    Cigarette smoker    Anemia of pregnancy    Iron deficiency anemia    Complete     History of cervical incompetence    Pregnancy    Encounter for maternal care for breech presentation in gutierrez pregnancy       Start Time:530  Stop Time: 1435    Interpretation A  Nonstress Test Interpretation A: Reactive

## 2023-12-11 NOTE — ANESTHESIA PREPROCEDURE EVALUATION
Anesthesia Evaluation     NPO Solid Status: > 8 hours  NPO Liquid Status: > 2 hours           Airway   Mallampati: II  TM distance: >3 FB  Neck ROM: full  Dental      Pulmonary    Cardiovascular   Exercise tolerance: good (4-7 METS)    Rhythm: regular  Rate: normal        Neuro/Psych  (+) headaches, psychiatric history Anxiety and Depression  GI/Hepatic/Renal/Endo    (+) GERD, diabetes mellitus type 2    Musculoskeletal     Abdominal    Substance History      OB/GYN    (+) Pregnant        Other                          Anesthesia Plan    ASA 2     epidural     (IUP at 37w3d for ECV)  intravenous induction           CODE STATUS:    Level Of Support Discussed With: Patient  Code Status (Patient has no pulse and is not breathing): CPR (Attempt to Resuscitate)  Medical Interventions (Patient has pulse or is breathing): Full Support

## 2023-12-19 ENCOUNTER — HOSPITAL ENCOUNTER (EMERGENCY)
Facility: HOSPITAL | Age: 33
Discharge: HOME | End: 2023-12-19
Attending: OBSTETRICS & GYNECOLOGY | Admitting: OBSTETRICS & GYNECOLOGY
Payer: COMMERCIAL

## 2023-12-19 VITALS
BODY MASS INDEX: 30.52 KG/M2 | TEMPERATURE: 98.8 F | HEART RATE: 84 BPM | RESPIRATION RATE: 16 BRPM | DIASTOLIC BLOOD PRESSURE: 73 MMHG | HEIGHT: 65 IN | WEIGHT: 183.2 LBS | SYSTOLIC BLOOD PRESSURE: 122 MMHG | OXYGEN SATURATION: 99 %

## 2023-12-19 LAB
A1 MICROGLOB PLACENTAL VAG QL: NEGATIVE
BACTERIA UR QL AUTO: ABNORMAL /HPF
BILIRUB UR QL STRIP: NEGATIVE
CLARITY UR: CLEAR
COLOR UR: YELLOW
GLUCOSE BLDC GLUCOMTR-MCNC: 80 MG/DL (ref 70–130)
GLUCOSE UR STRIP-MCNC: NEGATIVE MG/DL
HGB UR QL STRIP.AUTO: NEGATIVE
HYALINE CASTS UR QL AUTO: ABNORMAL /LPF
KETONES UR QL STRIP: ABNORMAL
LEUKOCYTE ESTERASE UR QL STRIP.AUTO: ABNORMAL
NITRITE UR QL STRIP: NEGATIVE
PH UR STRIP.AUTO: 6.5 [PH] (ref 5–8)
PROT UR QL STRIP: NEGATIVE
RBC # UR STRIP: ABNORMAL /HPF
REF LAB TEST METHOD: ABNORMAL
SP GR UR STRIP: 1.01 (ref 1–1.03)
SQUAMOUS #/AREA URNS HPF: ABNORMAL /HPF
UROBILINOGEN UR QL STRIP: ABNORMAL
WBC # UR STRIP: ABNORMAL /HPF

## 2023-12-19 PROCEDURE — 82948 REAGENT STRIP/BLOOD GLUCOSE: CPT

## 2023-12-19 PROCEDURE — 87086 URINE CULTURE/COLONY COUNT: CPT | Performed by: OBSTETRICS & GYNECOLOGY

## 2023-12-19 PROCEDURE — 81001 URINALYSIS AUTO W/SCOPE: CPT | Performed by: OBSTETRICS & GYNECOLOGY

## 2023-12-19 PROCEDURE — 84112 EVAL AMNIOTIC FLUID PROTEIN: CPT | Performed by: OBSTETRICS & GYNECOLOGY

## 2023-12-19 PROCEDURE — 59025 FETAL NON-STRESS TEST: CPT

## 2023-12-19 PROCEDURE — 99284 EMERGENCY DEPT VISIT MOD MDM: CPT | Performed by: OBSTETRICS & GYNECOLOGY

## 2023-12-20 NOTE — OBED NOTES
[facility] Note OB    Patient Name: Cydney Soto  YOB: 1990  MRN: [id number]  Admission Date: 2023  9:22 PM  Date of Service: 2023    Chief Complaint: Thinks her BOW is leaking        Subjective     Cydney Soto is a 33 y.o. female  at 38w4d with Estimated Date of Delivery: 23 who presents with the chief complaint listed above. Pt report since 530 PM as had heavy vaginal discharge . She is scheduled for a primary CS for breech. Of note pt had failed EV on  and had her cervical cerclage removed for hx of incompetent cervix     She sees Angelina Pendleton MD for her prenatal care. Her pregnancy has also  been complicated by:  anemia, obesity, GD on insulin, SUA, migraines, depression    She describes fetal movement as normal.  She admits to rupture of membranes.  She denies vaginal bleeding. She is not feeling contractions.        Objective   Patient Active Problem List    Diagnosis     Encounter for maternal care for breech presentation in gutierrez pregnancy [O32.1XX0]     History of cervical incompetence [Z87.42]     Pregnancy [Z34.90]     Complete  [O03.9]     Iron deficiency anemia [D50.9]     Anemia of pregnancy [O99.019]     Cigarette smoker [F17.210]         OB History    Para Term  AB Living   3 0 0 0 2 0   SAB IAB Ectopic Molar Multiple Live Births   1 0 0 0 0 0      # Outcome Date GA Lbr Eric/2nd Weight Sex Delivery Anes PTL Lv   3 Current            2 AB 2022 15w0d          1 SAB  4w0d               Past Medical History:   Diagnosis Date    Anemia     Anxiety     Bilateral bunions     Chronic mental illness     Depression and anxiety    Depression     Depressive disorder 2017    Gastroesophageal reflux disease 2020    GERD (gastroesophageal reflux disease)     Gestational diabetes     Migraine     Pilonidal cyst     SCHEDULED FOR SX       Past Surgical History:   Procedure Laterality Date    CERVICAL CERCLAGE  N/A 6/16/2023    Procedure: CERVICAL CERCLAGE;  Surgeon: Nicolle Colorado MD;  Location:  [facility];  Service: Obstetrics;  Laterality: N/A;    HAND LACERATION REPAIR      [age] YEAR OLD    PILONIDAL CYSTECTOMY N/A 7/17/2020    Procedure: PILONIDAL CYSTECTOMY;  Surgeon: Bk Martini MD;  Location:  [facility];  Service: General;  Laterality: N/A;    TONSILLECTOMY      WISDOM TOOTH EXTRACTION         No current facility-administered medications on file prior to encounter.     Current Outpatient Medications on File Prior to Encounter   Medication Sig Dispense Refill    acetaminophen (TYLENOL) 650 MG 8 hr tablet Take 1 tablet by mouth Every 8 (Eight) Hours As Needed for Mild Pain.      escitalopram (LEXAPRO) 5 MG tablet Take 1 tablet by mouth Daily.      NovoLIN N FlexPen 100 UNIT/ML injection Inject 16 Units under the skin into the appropriate area as directed Every Night.      prenatal vitamin (prenatal, CLASSIC, vitamin) tablet Take  by mouth Daily.      Progesterone (PROMETRIUM) 200 MG capsule Insert 1 capsule into the vagina Every Night. 30 capsule 5       Allergies   Allergen Reactions    Penicillins Other (See Comments) and Hives     UNSURE OF REACTION, can tolerate Amoxicillin    Propranolol Anaphylaxis    Shrimp Other (See Comments)     Tongue itch       Family History   Problem Relation Age of Onset    Depression Mother     Alcohol abuse Father     Learning disabilities Sister     Depression Maternal Grandmother     Heart disease Maternal Grandfather     Breast cancer Neg Hx     Ovarian cancer Neg Hx     Colon cancer Neg Hx     Deep vein thrombosis Neg Hx     Pulmonary embolism Neg Hx     Birth defects Neg Hx     Mental retardation Neg Hx        Social History     Socioeconomic History    Marital status:    Tobacco Use    Smoking status: Former     Packs/day: 1.00     Years: 15.00     Additional pack years: 0.00     Total pack years: 15.00     Types: Cigarettes     Passive exposure: Never     "Smokeless tobacco: Never   Vaping Use    Vaping Use: Every day    Substances: Nicotine, Flavoring    Devices: Disposable   Substance and Sexual Activity    Alcohol use: Not Currently     Comment: monthly 1-2 DRINKS MAYBE MONTHLY    Drug use: No    Sexual activity: Yes     Partners: Male     Birth control/protection: None           Review of Systems   Constitutional:  Negative for chills and fever.   HENT: Negative.     Eyes:  Negative for photophobia and visual disturbance.   Respiratory:  Negative for shortness of breath.    Cardiovascular:  Negative for chest pain.   Gastrointestinal:  Negative for nausea.   Genitourinary:  Positive for vaginal discharge.   Psychiatric/Behavioral:  The patient is not nervous/anxious.           PHYSICAL EXAM:      VITAL SIGNS:  Vitals:    12/19/23 2134   Weight: 83.1 kg (183 lb 3.2 oz)            FHT'S:    130 with moderate variability and accels                                     PHYSICAL EXAM:      General: well developed; well nourished  no acute distress   Heart: Not performed.   Lungs   breathing is unlabored   Abdomen: Gravid and non tender     Extremities: trace edema, DTRs 1 plus, no clonus       Cervix: Per RN        Contractions:   irregular                    LABS AND TESTING ORDERED:  Uterine and fetal monitoring  Urinalysis  ROM plus    LAB RESULTS:    No results found for this or any previous visit (from the past 24 hour(s)).    Lab Results   Component Value Date    ABO B 12/11/2023    RH Positive 12/11/2023       No results found for: \"STREPGPB\"              External Prenatal Results       Pregnancy Outside Results - Transcribed From Office Records - See Scanned Records For Details       Test Value Date Time    ABO  B  12/11/23 0641    Rh  Positive  12/11/23 0641    Antibody Screen  Negative  12/11/23 0641       Negative  06/16/23 0618    Varicella IgG  984 index 06/15/22 1124    Rubella  3.71 index 06/15/22 1124    Hgb  10.8 g/dL 12/11/23 0641       10.0 g/dL " 23 0618       10.9 g/dL 23    Hct  32.0 % 23 0641       30.7 % 23 0618       33.7 % 23    Glucose Fasting GTT  84 mg/dL 22 0909    Glucose Tolerance Test 1 hour  190 mg/dL 22 0909    Glucose Tolerance Test 3 hour       Gonorrhea (discrete)  Negative  06/15/22 1429    Chlamydia (discrete)  Negative  06/15/22 1429    RPR  Non Reactive  06/15/22 1124    VDRL       Syphilis Antibody       HBsAg  Negative  06/15/22 1124    Herpes Simplex Virus PCR       Herpes Simplex VIrus Culture       HIV  Non Reactive  06/15/22 1124    Hep C RNA Quant PCR       Hep C Antibody  <0.1 s/co ratio 06/15/22 1124    AFP       Group B Strep       GBS Susceptibility to Clindamycin       GBS Susceptibility to Erythromycin       Fetal Fibronectin       Genetic Testing, Maternal Blood                 Drug Screening       Test Value Date Time    Urine Drug Screen       Amphetamine Screen  Negative ng/mL 06/15/22 1434    Barbiturate Screen  Negative ng/mL 06/15/22 1434    Benzodiazepine Screen  Negative ng/mL 06/15/22 1434    Methadone Screen  Negative ng/mL 06/15/22 1434    Phencyclidine Screen  Negative ng/mL 06/15/22 1434    Opiates Screen       THC Screen       Cocaine Screen       Propoxyphene Screen  Negative ng/mL 06/15/22 1434    Buprenorphine Screen       Methamphetamine Screen       Oxycodone Screen       Tricyclic Antidepressants Screen                 Legend    ^: Historical                              Impression:   @ 38w4d .  Final Diagnosis: RO ROM    Plan:  1. Discharge to home.    2. Plan of care has been reviewed with patient along with risks, benefits of treatment.   All questions have been answered. Call health care provider for any further concerns and keep office appointments.  3. Comfort measures, labor precautons      I discussed the patients findings and my recommendations with patient, family, and nursing staff      Mary Walden MD  2023  21:46 EST

## 2023-12-20 NOTE — NURSING NOTE
Patient discharged ambulatory in company of spouse. Patient given verbal and written education regarding when to return to the hospital, s/s, contractions, LOF, vaginal bleeding, and urgent maternal warning signs. Patient verbalized understanding.

## 2023-12-21 ENCOUNTER — ANESTHESIA EVENT (OUTPATIENT)
Dept: LABOR AND DELIVERY | Facility: HOSPITAL | Age: 33
End: 2023-12-21
Payer: COMMERCIAL

## 2023-12-21 LAB — BACTERIA SPEC AEROBE CULT: NORMAL

## 2023-12-22 ENCOUNTER — ANESTHESIA (OUTPATIENT)
Dept: LABOR AND DELIVERY | Facility: HOSPITAL | Age: 33
End: 2023-12-22
Payer: COMMERCIAL

## 2023-12-22 ENCOUNTER — HOSPITAL ENCOUNTER (INPATIENT)
Facility: HOSPITAL | Age: 33
LOS: 3 days | Discharge: HOME | End: 2023-12-25
Attending: OBSTETRICS & GYNECOLOGY | Admitting: OBSTETRICS & GYNECOLOGY
Payer: COMMERCIAL

## 2023-12-22 DIAGNOSIS — Z34.90 PREGNANCY, UNSPECIFIED GESTATIONAL AGE: Primary | ICD-10-CM

## 2023-12-22 LAB
ABO GROUP BLD: NORMAL
ALBUMIN SERPL-MCNC: 3.2 G/DL (ref 3.5–5.2)
ALBUMIN/GLOB SERPL: 1 G/DL
ALP SERPL-CCNC: 152 U/L (ref 39–117)
ALT SERPL W P-5'-P-CCNC: 9 U/L (ref 1–33)
ANION GAP SERPL CALCULATED.3IONS-SCNC: 14 MMOL/L (ref 5–15)
AST SERPL-CCNC: 12 U/L (ref 1–32)
BASOPHILS # BLD AUTO: 0.02 10*3/MM3 (ref 0–0.2)
BASOPHILS NFR BLD AUTO: 0.2 % (ref 0–1.5)
BILIRUB SERPL-MCNC: 0.4 MG/DL (ref 0–1.2)
BLD GP AB SCN SERPL QL: NEGATIVE
BUN SERPL-MCNC: 8 MG/DL (ref 6–20)
BUN/CREAT SERPL: 15.4 (ref 7–25)
CALCIUM SPEC-SCNC: 8.7 MG/DL (ref 8.6–10.5)
CHLORIDE SERPL-SCNC: 106 MMOL/L (ref 98–107)
CO2 SERPL-SCNC: 17 MMOL/L (ref 22–29)
CREAT SERPL-MCNC: 0.52 MG/DL (ref 0.57–1)
DEPRECATED RDW RBC AUTO: 46.4 FL (ref 37–54)
EGFRCR SERPLBLD CKD-EPI 2021: 127.6 ML/MIN/1.73
EOSINOPHIL # BLD AUTO: 0.09 10*3/MM3 (ref 0–0.4)
EOSINOPHIL NFR BLD AUTO: 1 % (ref 0.3–6.2)
ERYTHROCYTE [DISTWIDTH] IN BLOOD BY AUTOMATED COUNT: 14.4 % (ref 12.3–15.4)
GLOBULIN UR ELPH-MCNC: 3.1 GM/DL
GLUCOSE SERPL-MCNC: 95 MG/DL (ref 65–99)
HCT VFR BLD AUTO: 34.6 % (ref 34–46.6)
HGB BLD-MCNC: 11.7 G/DL (ref 12–15.9)
IMM GRANULOCYTES # BLD AUTO: 0.09 10*3/MM3 (ref 0–0.05)
IMM GRANULOCYTES NFR BLD AUTO: 1 % (ref 0–0.5)
LYMPHOCYTES # BLD AUTO: 1.59 10*3/MM3 (ref 0.7–3.1)
LYMPHOCYTES NFR BLD AUTO: 17.8 % (ref 19.6–45.3)
MCH RBC QN AUTO: 30.4 PG (ref 26.6–33)
MCHC RBC AUTO-ENTMCNC: 33.8 G/DL (ref 31.5–35.7)
MCV RBC AUTO: 89.9 FL (ref 79–97)
MONOCYTES # BLD AUTO: 0.55 10*3/MM3 (ref 0.1–0.9)
MONOCYTES NFR BLD AUTO: 6.1 % (ref 5–12)
NEUTROPHILS NFR BLD AUTO: 6.61 10*3/MM3 (ref 1.7–7)
NEUTROPHILS NFR BLD AUTO: 73.9 % (ref 42.7–76)
NRBC BLD AUTO-RTO: 0 /100 WBC (ref 0–0.2)
PLATELET # BLD AUTO: 232 10*3/MM3 (ref 140–450)
PMV BLD AUTO: 10.2 FL (ref 6–12)
POTASSIUM SERPL-SCNC: 3.9 MMOL/L (ref 3.5–5.2)
PROT SERPL-MCNC: 6.3 G/DL (ref 6–8.5)
RBC # BLD AUTO: 3.85 10*6/MM3 (ref 3.77–5.28)
RH BLD: POSITIVE
SODIUM SERPL-SCNC: 137 MMOL/L (ref 136–145)
T&S EXPIRATION DATE: NORMAL
WBC NRBC COR # BLD AUTO: 8.95 10*3/MM3 (ref 3.4–10.8)

## 2023-12-22 PROCEDURE — 25010000002 KETOROLAC TROMETHAMINE PER 15 MG: Performed by: OBSTETRICS & GYNECOLOGY

## 2023-12-22 PROCEDURE — 86901 BLOOD TYPING SEROLOGIC RH(D): CPT | Performed by: OBSTETRICS & GYNECOLOGY

## 2023-12-22 PROCEDURE — 25010000002 MORPHINE PER 10 MG: Performed by: STUDENT IN AN ORGANIZED HEALTH CARE EDUCATION/TRAINING PROGRAM

## 2023-12-22 PROCEDURE — 80053 COMPREHEN METABOLIC PANEL: CPT | Performed by: OBSTETRICS & GYNECOLOGY

## 2023-12-22 PROCEDURE — 25010000002 BUPIVACAINE PF 0.75 % SOLUTION: Performed by: STUDENT IN AN ORGANIZED HEALTH CARE EDUCATION/TRAINING PROGRAM

## 2023-12-22 PROCEDURE — 25810000003 LACTATED RINGERS PER 1000 ML: Performed by: OBSTETRICS & GYNECOLOGY

## 2023-12-22 PROCEDURE — 25010000002 HYDROMORPHONE PER 4 MG: Performed by: STUDENT IN AN ORGANIZED HEALTH CARE EDUCATION/TRAINING PROGRAM

## 2023-12-22 PROCEDURE — 86850 RBC ANTIBODY SCREEN: CPT | Performed by: OBSTETRICS & GYNECOLOGY

## 2023-12-22 PROCEDURE — 88307 TISSUE EXAM BY PATHOLOGIST: CPT

## 2023-12-22 PROCEDURE — 25010000002 PHENYLEPHRINE 10 MG/ML SOLUTION: Performed by: STUDENT IN AN ORGANIZED HEALTH CARE EDUCATION/TRAINING PROGRAM

## 2023-12-22 PROCEDURE — 25010000002 FENTANYL CITRATE (PF) 50 MCG/ML SOLUTION: Performed by: STUDENT IN AN ORGANIZED HEALTH CARE EDUCATION/TRAINING PROGRAM

## 2023-12-22 PROCEDURE — 25010000002 KETOROLAC TROMETHAMINE PER 15 MG: Performed by: STUDENT IN AN ORGANIZED HEALTH CARE EDUCATION/TRAINING PROGRAM

## 2023-12-22 PROCEDURE — 25010000002 ONDANSETRON PER 1 MG: Performed by: ANESTHESIOLOGY

## 2023-12-22 PROCEDURE — 85025 COMPLETE CBC W/AUTO DIFF WBC: CPT | Performed by: OBSTETRICS & GYNECOLOGY

## 2023-12-22 PROCEDURE — 25010000002 CEFAZOLIN IN DEXTROSE 2-4 GM/100ML-% SOLUTION: Performed by: OBSTETRICS & GYNECOLOGY

## 2023-12-22 PROCEDURE — 86900 BLOOD TYPING SEROLOGIC ABO: CPT | Performed by: OBSTETRICS & GYNECOLOGY

## 2023-12-22 RX ORDER — IBUPROFEN 600 MG/1
600 TABLET ORAL EVERY 6 HOURS
Status: DISCONTINUED | OUTPATIENT
Start: 2023-12-23 | End: 2023-12-25 | Stop reason: HOSPADM

## 2023-12-22 RX ORDER — DIPHENHYDRAMINE HYDROCHLORIDE 50 MG/ML
25 INJECTION INTRAMUSCULAR; INTRAVENOUS EVERY 4 HOURS PRN
Status: DISCONTINUED | OUTPATIENT
Start: 2023-12-22 | End: 2023-12-25 | Stop reason: HOSPADM

## 2023-12-22 RX ORDER — DIPHENHYDRAMINE HCL 25 MG
25 CAPSULE ORAL EVERY 4 HOURS PRN
Status: DISCONTINUED | OUTPATIENT
Start: 2023-12-22 | End: 2023-12-25 | Stop reason: HOSPADM

## 2023-12-22 RX ORDER — SODIUM CHLORIDE 9 MG/ML
40 INJECTION, SOLUTION INTRAVENOUS AS NEEDED
Status: DISCONTINUED | OUTPATIENT
Start: 2023-12-22 | End: 2023-12-22 | Stop reason: HOSPADM

## 2023-12-22 RX ORDER — MISOPROSTOL 200 UG/1
800 TABLET ORAL ONCE AS NEEDED
Status: DISCONTINUED | OUTPATIENT
Start: 2023-12-22 | End: 2023-12-25 | Stop reason: HOSPADM

## 2023-12-22 RX ORDER — NALOXONE HCL 0.4 MG/ML
0.2 VIAL (ML) INJECTION
Status: CANCELLED | OUTPATIENT
Start: 2023-12-22

## 2023-12-22 RX ORDER — ONDANSETRON 4 MG/1
4 TABLET, FILM COATED ORAL EVERY 8 HOURS PRN
Status: DISCONTINUED | OUTPATIENT
Start: 2023-12-22 | End: 2023-12-25 | Stop reason: HOSPADM

## 2023-12-22 RX ORDER — SODIUM CHLORIDE, SODIUM LACTATE, POTASSIUM CHLORIDE, CALCIUM CHLORIDE 600; 310; 30; 20 MG/100ML; MG/100ML; MG/100ML; MG/100ML
125 INJECTION, SOLUTION INTRAVENOUS CONTINUOUS
Status: DISCONTINUED | OUTPATIENT
Start: 2023-12-22 | End: 2023-12-22

## 2023-12-22 RX ORDER — OXYTOCIN/0.9 % SODIUM CHLORIDE 30/500 ML
250 PLASTIC BAG, INJECTION (ML) INTRAVENOUS CONTINUOUS
Status: DISPENSED | OUTPATIENT
Start: 2023-12-22 | End: 2023-12-22

## 2023-12-22 RX ORDER — MORPHINE SULFATE 4 MG/ML
INJECTION, SOLUTION INTRAMUSCULAR; INTRAVENOUS
Status: COMPLETED | OUTPATIENT
Start: 2023-12-22 | End: 2023-12-22

## 2023-12-22 RX ORDER — FENTANYL CITRATE 50 UG/ML
INJECTION, SOLUTION INTRAMUSCULAR; INTRAVENOUS
Status: COMPLETED | OUTPATIENT
Start: 2023-12-22 | End: 2023-12-22

## 2023-12-22 RX ORDER — KETOROLAC TROMETHAMINE 30 MG/ML
30 INJECTION, SOLUTION INTRAMUSCULAR; INTRAVENOUS ONCE
Status: DISCONTINUED | OUTPATIENT
Start: 2023-12-22 | End: 2023-12-22 | Stop reason: HOSPADM

## 2023-12-22 RX ORDER — MORPHINE SULFATE 2 MG/ML
2 INJECTION, SOLUTION INTRAMUSCULAR; INTRAVENOUS
Status: ACTIVE | OUTPATIENT
Start: 2023-12-22 | End: 2023-12-22

## 2023-12-22 RX ORDER — DOCUSATE SODIUM 100 MG/1
100 CAPSULE, LIQUID FILLED ORAL 2 TIMES DAILY PRN
Status: DISCONTINUED | OUTPATIENT
Start: 2023-12-22 | End: 2023-12-25 | Stop reason: HOSPADM

## 2023-12-22 RX ORDER — OXYCODONE HYDROCHLORIDE 10 MG/1
10 TABLET ORAL EVERY 4 HOURS PRN
Status: DISCONTINUED | OUTPATIENT
Start: 2023-12-22 | End: 2023-12-25 | Stop reason: HOSPADM

## 2023-12-22 RX ORDER — TRANEXAMIC ACID 10 MG/ML
1000 INJECTION, SOLUTION INTRAVENOUS ONCE AS NEEDED
Status: DISCONTINUED | OUTPATIENT
Start: 2023-12-22 | End: 2023-12-22 | Stop reason: HOSPADM

## 2023-12-22 RX ORDER — ACETAMINOPHEN 500 MG
1000 TABLET ORAL EVERY 6 HOURS
Status: DISPENSED | OUTPATIENT
Start: 2023-12-22 | End: 2023-12-23

## 2023-12-22 RX ORDER — OXYTOCIN/0.9 % SODIUM CHLORIDE 30/500 ML
999 PLASTIC BAG, INJECTION (ML) INTRAVENOUS ONCE
Status: DISCONTINUED | OUTPATIENT
Start: 2023-12-22 | End: 2023-12-25 | Stop reason: HOSPADM

## 2023-12-22 RX ORDER — CARBOPROST TROMETHAMINE 250 UG/ML
250 INJECTION, SOLUTION INTRAMUSCULAR
Status: DISCONTINUED | OUTPATIENT
Start: 2023-12-22 | End: 2023-12-25 | Stop reason: HOSPADM

## 2023-12-22 RX ORDER — BUPIVACAINE HYDROCHLORIDE 7.5 MG/ML
INJECTION, SOLUTION EPIDURAL; RETROBULBAR
Status: COMPLETED | OUTPATIENT
Start: 2023-12-22 | End: 2023-12-22

## 2023-12-22 RX ORDER — OXYTOCIN/0.9 % SODIUM CHLORIDE 30/500 ML
999 PLASTIC BAG, INJECTION (ML) INTRAVENOUS ONCE
Status: COMPLETED | OUTPATIENT
Start: 2023-12-22 | End: 2023-12-22

## 2023-12-22 RX ORDER — LIDOCAINE HYDROCHLORIDE 10 MG/ML
0.5 INJECTION, SOLUTION INFILTRATION; PERINEURAL ONCE AS NEEDED
Status: DISCONTINUED | OUTPATIENT
Start: 2023-12-22 | End: 2023-12-22 | Stop reason: HOSPADM

## 2023-12-22 RX ORDER — CEFAZOLIN SODIUM 2 G/100ML
2000 INJECTION, SOLUTION INTRAVENOUS ONCE
Status: COMPLETED | OUTPATIENT
Start: 2023-12-22 | End: 2023-12-22

## 2023-12-22 RX ORDER — ONDANSETRON 2 MG/ML
4 INJECTION INTRAMUSCULAR; INTRAVENOUS ONCE AS NEEDED
Status: DISCONTINUED | OUTPATIENT
Start: 2023-12-22 | End: 2023-12-25 | Stop reason: HOSPADM

## 2023-12-22 RX ORDER — ONDANSETRON 2 MG/ML
4 INJECTION INTRAMUSCULAR; INTRAVENOUS ONCE AS NEEDED
Status: COMPLETED | OUTPATIENT
Start: 2023-12-22 | End: 2023-12-22

## 2023-12-22 RX ORDER — CARBOPROST TROMETHAMINE 250 UG/ML
250 INJECTION, SOLUTION INTRAMUSCULAR
Status: DISCONTINUED | OUTPATIENT
Start: 2023-12-22 | End: 2023-12-22 | Stop reason: HOSPADM

## 2023-12-22 RX ORDER — OXYTOCIN/0.9 % SODIUM CHLORIDE 30/500 ML
125 PLASTIC BAG, INJECTION (ML) INTRAVENOUS CONTINUOUS PRN
Status: COMPLETED | OUTPATIENT
Start: 2023-12-22 | End: 2023-12-22

## 2023-12-22 RX ORDER — SIMETHICONE 80 MG
80 TABLET,CHEWABLE ORAL 4 TIMES DAILY PRN
Status: DISCONTINUED | OUTPATIENT
Start: 2023-12-22 | End: 2023-12-25 | Stop reason: HOSPADM

## 2023-12-22 RX ORDER — OXYTOCIN/0.9 % SODIUM CHLORIDE 30/500 ML
250 PLASTIC BAG, INJECTION (ML) INTRAVENOUS CONTINUOUS
Status: ACTIVE | OUTPATIENT
Start: 2023-12-22 | End: 2023-12-22

## 2023-12-22 RX ORDER — SODIUM CHLORIDE 0.9 % (FLUSH) 0.9 %
10 SYRINGE (ML) INJECTION EVERY 12 HOURS SCHEDULED
Status: DISCONTINUED | OUTPATIENT
Start: 2023-12-22 | End: 2023-12-22 | Stop reason: HOSPADM

## 2023-12-22 RX ORDER — CITRIC ACID/SODIUM CITRATE 334-500MG
30 SOLUTION, ORAL ORAL ONCE
Status: COMPLETED | OUTPATIENT
Start: 2023-12-22 | End: 2023-12-22

## 2023-12-22 RX ORDER — KETOROLAC TROMETHAMINE 30 MG/ML
INJECTION, SOLUTION INTRAMUSCULAR; INTRAVENOUS AS NEEDED
Status: DISCONTINUED | OUTPATIENT
Start: 2023-12-22 | End: 2023-12-22 | Stop reason: SURG

## 2023-12-22 RX ORDER — OXYCODONE HYDROCHLORIDE 5 MG/1
5 TABLET ORAL EVERY 4 HOURS PRN
Status: DISCONTINUED | OUTPATIENT
Start: 2023-12-22 | End: 2023-12-25 | Stop reason: HOSPADM

## 2023-12-22 RX ORDER — ACETAMINOPHEN 325 MG/1
650 TABLET ORAL EVERY 6 HOURS
Status: DISCONTINUED | OUTPATIENT
Start: 2023-12-23 | End: 2023-12-25 | Stop reason: HOSPADM

## 2023-12-22 RX ORDER — DROPERIDOL 2.5 MG/ML
0.62 INJECTION, SOLUTION INTRAMUSCULAR; INTRAVENOUS
Status: DISCONTINUED | OUTPATIENT
Start: 2023-12-22 | End: 2023-12-25 | Stop reason: HOSPADM

## 2023-12-22 RX ORDER — PRENATAL VIT/IRON FUM/FOLIC AC 27MG-0.8MG
1 TABLET ORAL DAILY
Status: DISCONTINUED | OUTPATIENT
Start: 2023-12-22 | End: 2023-12-25 | Stop reason: HOSPADM

## 2023-12-22 RX ORDER — ACETAMINOPHEN 500 MG
1000 TABLET ORAL ONCE
Status: COMPLETED | OUTPATIENT
Start: 2023-12-22 | End: 2023-12-22

## 2023-12-22 RX ORDER — KETOROLAC TROMETHAMINE 15 MG/ML
15 INJECTION, SOLUTION INTRAMUSCULAR; INTRAVENOUS EVERY 6 HOURS
Status: DISPENSED | OUTPATIENT
Start: 2023-12-22 | End: 2023-12-23

## 2023-12-22 RX ORDER — OXYTOCIN/0.9 % SODIUM CHLORIDE 30/500 ML
125 PLASTIC BAG, INJECTION (ML) INTRAVENOUS CONTINUOUS PRN
Status: DISCONTINUED | OUTPATIENT
Start: 2023-12-22 | End: 2023-12-25 | Stop reason: HOSPADM

## 2023-12-22 RX ORDER — HYDROMORPHONE HYDROCHLORIDE 1 MG/ML
0.5 INJECTION, SOLUTION INTRAMUSCULAR; INTRAVENOUS; SUBCUTANEOUS
Status: DISCONTINUED | OUTPATIENT
Start: 2023-12-22 | End: 2023-12-22 | Stop reason: HOSPADM

## 2023-12-22 RX ORDER — METHYLERGONOVINE MALEATE 0.2 MG/ML
200 INJECTION INTRAVENOUS ONCE AS NEEDED
Status: DISCONTINUED | OUTPATIENT
Start: 2023-12-22 | End: 2023-12-25 | Stop reason: HOSPADM

## 2023-12-22 RX ORDER — MISOPROSTOL 200 UG/1
800 TABLET ORAL ONCE AS NEEDED
Status: DISCONTINUED | OUTPATIENT
Start: 2023-12-22 | End: 2023-12-22 | Stop reason: HOSPADM

## 2023-12-22 RX ORDER — SODIUM CHLORIDE 0.9 % (FLUSH) 0.9 %
10 SYRINGE (ML) INJECTION AS NEEDED
Status: DISCONTINUED | OUTPATIENT
Start: 2023-12-22 | End: 2023-12-22 | Stop reason: HOSPADM

## 2023-12-22 RX ORDER — FAMOTIDINE 10 MG/ML
20 INJECTION, SOLUTION INTRAVENOUS ONCE AS NEEDED
Status: COMPLETED | OUTPATIENT
Start: 2023-12-22 | End: 2023-12-22

## 2023-12-22 RX ORDER — ESCITALOPRAM OXALATE 5 MG/1
5 TABLET ORAL DAILY
Status: DISCONTINUED | OUTPATIENT
Start: 2023-12-22 | End: 2023-12-25 | Stop reason: HOSPADM

## 2023-12-22 RX ORDER — PHENYLEPHRINE HYDROCHLORIDE 10 MG/ML
INJECTION INTRAVENOUS AS NEEDED
Status: DISCONTINUED | OUTPATIENT
Start: 2023-12-22 | End: 2023-12-22 | Stop reason: SURG

## 2023-12-22 RX ORDER — METHYLERGONOVINE MALEATE 0.2 MG/ML
200 INJECTION INTRAVENOUS ONCE AS NEEDED
Status: DISCONTINUED | OUTPATIENT
Start: 2023-12-22 | End: 2023-12-22 | Stop reason: HOSPADM

## 2023-12-22 RX ADMIN — PHENYLEPHRINE HYDROCHLORIDE 100 MCG: 10 INJECTION INTRAVENOUS at 11:25

## 2023-12-22 RX ADMIN — Medication 125 ML/HR: at 12:37

## 2023-12-22 RX ADMIN — PHENYLEPHRINE HYDROCHLORIDE 100 MCG: 10 INJECTION INTRAVENOUS at 10:55

## 2023-12-22 RX ADMIN — SODIUM CITRATE AND CITRIC ACID MONOHYDRATE 30 ML: 334; 500 SOLUTION ORAL at 10:23

## 2023-12-22 RX ADMIN — PHENYLEPHRINE HYDROCHLORIDE 100 MCG: 10 INJECTION INTRAVENOUS at 10:59

## 2023-12-22 RX ADMIN — SODIUM CHLORIDE, POTASSIUM CHLORIDE, SODIUM LACTATE AND CALCIUM CHLORIDE 125 ML/HR: 600; 310; 30; 20 INJECTION, SOLUTION INTRAVENOUS at 09:00

## 2023-12-22 RX ADMIN — FENTANYL CITRATE 20 MCG: 50 INJECTION, SOLUTION INTRAMUSCULAR; INTRAVENOUS at 10:40

## 2023-12-22 RX ADMIN — CEFAZOLIN SODIUM 2000 MG: 2 INJECTION, SOLUTION INTRAVENOUS at 10:31

## 2023-12-22 RX ADMIN — OXYCODONE HYDROCHLORIDE 5 MG: 5 TABLET ORAL at 15:37

## 2023-12-22 RX ADMIN — ESCITALOPRAM 5 MG: 5 TABLET, FILM COATED ORAL at 21:13

## 2023-12-22 RX ADMIN — MORPHINE SULFATE 150 MCG: 4 INJECTION, SOLUTION INTRAMUSCULAR; INTRAVENOUS at 10:40

## 2023-12-22 RX ADMIN — PHENYLEPHRINE HYDROCHLORIDE 100 MCG: 10 INJECTION INTRAVENOUS at 10:45

## 2023-12-22 RX ADMIN — ACETAMINOPHEN 1000 MG: 500 TABLET ORAL at 10:23

## 2023-12-22 RX ADMIN — Medication 999 ML/HR: at 11:02

## 2023-12-22 RX ADMIN — HYDROMORPHONE HYDROCHLORIDE 0.5 MG: 1 INJECTION, SOLUTION INTRAMUSCULAR; INTRAVENOUS; SUBCUTANEOUS at 13:36

## 2023-12-22 RX ADMIN — ONDANSETRON HYDROCHLORIDE 4 MG: 2 INJECTION, SOLUTION INTRAMUSCULAR; INTRAVENOUS at 10:22

## 2023-12-22 RX ADMIN — Medication 1 TABLET: at 15:38

## 2023-12-22 RX ADMIN — KETOROLAC TROMETHAMINE 15 MG: 15 INJECTION, SOLUTION INTRAMUSCULAR; INTRAVENOUS at 18:02

## 2023-12-22 RX ADMIN — BUPIVACAINE HYDROCHLORIDE 1.6 ML: 7.5 INJECTION, SOLUTION EPIDURAL; RETROBULBAR at 10:40

## 2023-12-22 RX ADMIN — KETOROLAC TROMETHAMINE 30 MG: 30 INJECTION, SOLUTION INTRAMUSCULAR; INTRAVENOUS at 11:26

## 2023-12-22 RX ADMIN — ACETAMINOPHEN 1000 MG: 500 TABLET ORAL at 16:43

## 2023-12-22 RX ADMIN — FAMOTIDINE 20 MG: 10 INJECTION INTRAVENOUS at 10:22

## 2023-12-22 RX ADMIN — PHENYLEPHRINE HYDROCHLORIDE 100 MCG: 10 INJECTION INTRAVENOUS at 10:48

## 2023-12-22 NOTE — ANESTHESIA PREPROCEDURE EVALUATION
Anesthesia Evaluation     Patient summary reviewed and Nursing notes reviewed   NPO Solid Status: > 8 hours  NPO Liquid Status: > 4 hours           Airway   Mallampati: II  Neck ROM: full  No difficulty expected  Dental - normal exam     Pulmonary     breath sounds clear to auscultation  (+) a smoker Former,  Cardiovascular     Rhythm: regular        Neuro/Psych  (+) headaches, psychiatric history Depression and Anxiety  GI/Hepatic/Renal/Endo    (+) GERD, diabetes mellitus    Musculoskeletal     Abdominal   (+) obese   Substance History      OB/GYN    (+) Pregnant        Other                    Anesthesia Plan    ASA 2     spinal     (  39w0d  )    Anesthetic plan, risks, benefits, and alternatives have been provided, discussed and informed consent has been obtained with: patient.    CODE STATUS:    Level Of Support Discussed With: Patient  Code Status (Patient has no pulse and is not breathing): CPR (Attempt to Resuscitate)  Medical Interventions (Patient has pulse or is breathing): Full Support

## 2023-12-22 NOTE — PLAN OF CARE
Problem: Adult Inpatient Plan of Care  Goal: Plan of Care Review  Outcome: Ongoing, Progressing  Flowsheets (Taken 2023 1254)  Progress: improving  Plan of Care Reviewed With: patient  Outcome Evaluation: pt and baby in rr, stable.  Anticipate transfer to [facility]  Goal: Patient-Specific Goal (Individualized)  Outcome: Ongoing, Progressing  Goal: Absence of Hospital-Acquired Illness or Injury  Outcome: Ongoing, Progressing  Intervention: Prevent and Manage VTE (Venous Thromboembolism) Risk  Recent Flowsheet Documentation  Taken 2023 1245 by Sanjuana Chase RN  VTE Prevention/Management:   bilateral   sequential compression devices on  Taken 2023 1152 by Sanjuana Chase RN  VTE Prevention/Management:   bilateral   sequential compression devices on  Taken 2023 1138 by Sanjuana Chase RN  VTE Prevention/Management:   bilateral   sequential compression devices on  Goal: Optimal Comfort and Wellbeing  Outcome: Ongoing, Progressing  Goal: Readiness for Transition of Care  Outcome: Ongoing, Progressing  Intervention: Mutually Develop Transition Plan  Recent Flowsheet Documentation  Taken 2023 1159 by Sanjuana Chase RN  Transportation Anticipated: family or friend will provide  Transportation Concerns: none  Patient/Family Anticipated Services at Transition: none  Patient/Family Anticipates Transition to: home with family     Problem:  Fall Injury Risk  Goal: Absence of Fall, Infant Drop and Related Injury  Outcome: Ongoing, Progressing   Goal Outcome Evaluation:  Plan of Care Reviewed With: patient        Progress: improving  Outcome Evaluation: pt and baby in rr, stable.  Anticipate transfer to [facility]

## 2023-12-22 NOTE — OP NOTE
[facility]   Section Operative Note    Patient Name: Cydney Soto  :  1990  MRN:  [id number]      Date of procedure:  2023        Pre-Operative Dx:   1.  IUP at 39w0d weeks   2. Breech  s/p failed ECV  3. A2GDM  4. Single umbilical artery  5. H/o cervical incompetence s/p cerclage with removal at 37 wks GA        Postoperative Dx:  Same        Procedure: primary    via Pfannensteil      Surgeon: Angelina Pendleton MD      Assistant: Josee Fair     Anesthesia: Spinal      EBL:    Quantitative EBL:  500cc    Quantitative Blood Loss (mL): 496 mL         Specimens: placenta     Findings:                           Amniotic Fluid clear  Placenta Intact, 3 VC  Uterus normal  Tubes and ovaries normal bilaterally  Infant noted to be cephalic on hysterotomy              Infant:           Gender: Viable female  infant     Weight: 3710 g (8 lb 2.9 oz)     Apgars: 9  @ 1 minute /     9  @ 5 minutes                 Indication for C/Section:     Breech               Procedure Details:  The patient was taken to the operating room where spinal anesthesia was found to be adequate. She was prepped and draped in the usual sterile manner in the dorsal supine position with leftward tilt. A Pfannenstiel incision was made and carried down to the fascia. The fascia was incised in the mid-line and extended transversely with Galeas scissors. The fascia was grasped and elevated and  from the underlying rectus muscles superiorly and inferiorly. The peritoneum was identified and entered. Peritoneal incision was extended superiorly and inferiorly with good visualization of the bladder. The bladder blade was inserted and the vesicouterine peritoneum was incised transversely and the bladder flap was created digitally. The bladder blade was reinserted. The  lower uterine segment was incised in a transverse fashion.  The head was elevated and delivered through the incision. The remainder of the  infant was delivered without difficulty. The umbilical cord was clamped and cut and the infant was handed off the field. Cord ph and cord bloods were obtained. The placenta was removed intact and appeared normal. The uterine incision was closed with running locked sutures of 0 chromic. Second layer closure was placed imbricating the first for hemostasis. The abdominal cavity was irrigated and cleared of all clot and debris. The uterine incision was inspected and noted to be hemostatic. Rectus muscles were reapproximated in the midline with 2-0 chromic.  The fascia was closed with 0 vicryl in running continuous fashion. The subcutaneous tissue was closed with 3-0 vicryl. The skin was closed in subcuticular fashion with 4-0 Vicryl.   Instrument, sponge, and needle counts were correct prior the abdominal closure and at the conclusion of the case. Mother and baby  to recovery room in stable condition.              Complications:     None          Antibiotics: cefazolin (Ancef)                      Angelina Pendleton MD  12/22/2023  11:56 EST

## 2023-12-22 NOTE — ANESTHESIA PROCEDURE NOTES
Spinal Block      Patient reassessed immediately prior to procedure    Patient location during procedure: OR  Start Time: 12/22/2023 10:40 AM  Stop Time: 12/22/2023 10:44 AM  Performed By  Anesthesiologist: Pete Michaud MD  Preanesthetic Checklist  Completed: patient identified, IV checked, site marked, risks and benefits discussed, surgical consent, monitors and equipment checked, pre-op evaluation and timeout performed  Spinal Block Prep:  Sterile Tech:cap, gloves and sterile barriers  Patient Monitoring:EKG, continuous pulse oximetry and blood pressure monitoring    Spinal Block Procedure  Approach:midline  Guidance:landmark technique and palpation technique  Location:L3-L4  Needle Type:Pencan  Needle Gauge:24 G  Placement of Spinal needle event:cerebrospinal fluid aspirated  Paresthesia: no  Fluid Appearance:clear  Medications: fentaNYL citrate (PF) (SUBLIMAZE) injection - Intrathecal   20 mcg - 12/22/2023 10:40:00 AM  Morphine sulfate (PF) injection - Spinal   150 mcg - 12/22/2023 10:40:00 AM  bupivacaine PF (MARCAINE) 0.75 % injection - Spinal   1.6 mL - 12/22/2023 10:40:00 AM   Post Assessment  Patient Tolerance:patient tolerated the procedure well with no apparent complications  Complications no

## 2023-12-22 NOTE — ANESTHESIA POSTPROCEDURE EVALUATION
"Patient: Cydney Soto    Procedure Summary       Date: 23 Room / Location:  [facility]    Anesthesia Start: 1032 Anesthesia Stop: 113    Procedure:  SECTION PRIMARY (Abdomen) Diagnosis:     Surgeons: Angelina Pendleton MD Provider: Pete Michaud MD    Anesthesia Type: spinal ASA Status: 2            Anesthesia Type: spinal    Vitals  Vitals Value Taken Time   /66 23 1315   Temp 36.3 °C (97.4 °F) 23 1242   Pulse 57 23 1319   Resp 16 23 1315   SpO2 98 % 23 1319   Vitals shown include unfiled device data.        Post Anesthesia Care and Evaluation    Patient location during evaluation: bedside  Patient participation: complete - patient participated  Level of consciousness: sleepy but conscious  Pain score: 0  Pain management: adequate    Airway patency: patent  Anesthetic complications: No anesthetic complications    Cardiovascular status: acceptable  Respiratory status: acceptable  Hydration status: acceptable    Comments: /66   Pulse 59   Temp 36.3 °C (97.4 °F) (Oral)   Resp 16   Ht 162.6 cm (64\")   Wt 82.6 kg (182 lb)   SpO2 97%   Breastfeeding Yes   BMI 31.24 kg/m²       "

## 2023-12-22 NOTE — H&P
[facility]  Obstetric History and Physical    Patient Name: Cydney Soto  :  1990  MRN:  [id number]      Chief Complaint   Patient presents with    Scheduled      Scheduled c/s for breech presentation. +FM, GDM, insulin controlled, took 16 units last night, non this morning        Subjective     Patient is a 33 y.o. female  currently at 39w0d, who presents for primary c/s due to breech presentation.  Pregnancy complicated by SUA, A2GDM, h/o cervical incompetence with 15wk delivery. Cerclage placed this pregnancy and removed at 37 weeks.  Had unsuccessful version attempt at that time.  Also with anemia s/p iron infusions.        Past Medical History:   Diagnosis Date    Anemia     Anxiety     Bilateral bunions     Chronic mental illness     Depression and anxiety    Depression     Depressive disorder 2017    Gastroesophageal reflux disease 2020    GERD (gastroesophageal reflux disease)     Gestational diabetes     Migraine     Pilonidal cyst     removed     Past Surgical History:   Procedure Laterality Date    CERVICAL CERCLAGE N/A 2023    Procedure: CERVICAL CERCLAGE;  Surgeon: Nicolle Colorado MD;  Location:  [facility];  Service: Obstetrics;  Laterality: N/A;    HAND LACERATION REPAIR      [age] YEAR OLD    PILONIDAL CYSTECTOMY N/A 2020    Procedure: PILONIDAL CYSTECTOMY;  Surgeon: Bk Martini MD;  Location: [facility];  Service: General;  Laterality: N/A;    TONSILLECTOMY      WISDOM TOOTH EXTRACTION       Penicillins, Propranolol, and Shrimp      Objective       Vital Signs Range for the last 24 hours  Temperature: Temp:  [97.9 °F (36.6 °C)] 97.9 °F (36.6 °C)   Temp Source: Temp src: Oral   BP: BP: (109-116)/(78-80) 116/78   Pulse: Heart Rate:  [85-86] 86   Respirations:                     Physical Examination:     General :  Alert in NAD  Abdomen: Gravid, soft    Presentation: breech                            Fetal Heart Rate Assessment  reactive, cat 1                                       Uterine Assessment quiet                                                     Results from last 7 days   Lab Units 23  0801   WBC 10*3/mm3 8.95   HEMOGLOBIN g/dL 11.7*   HEMATOCRIT % 34.6   PLATELETS 10*3/mm3 232       Assessment & Plan     1.  Intrauterine pregnancy at 39w0d weeks gestation for primary  delivery.   reactive fetal status.  Plan of care has been reviewed with patient and family.  All questions answered.      Pregnancy        H&P updated. The patient was examined and the following changes are noted above.         Angelina Pendleton MD  2023  09:37 EST

## 2023-12-23 LAB
BASOPHILS # BLD AUTO: 0.03 10*3/MM3 (ref 0–0.2)
BASOPHILS NFR BLD AUTO: 0.3 % (ref 0–1.5)
DEPRECATED RDW RBC AUTO: 45.7 FL (ref 37–54)
EOSINOPHIL # BLD AUTO: 0.07 10*3/MM3 (ref 0–0.4)
EOSINOPHIL NFR BLD AUTO: 0.7 % (ref 0.3–6.2)
ERYTHROCYTE [DISTWIDTH] IN BLOOD BY AUTOMATED COUNT: 14 % (ref 12.3–15.4)
HCT VFR BLD AUTO: 31.3 % (ref 34–46.6)
HGB BLD-MCNC: 10 G/DL (ref 12–15.9)
IMM GRANULOCYTES # BLD AUTO: 0.07 10*3/MM3 (ref 0–0.05)
IMM GRANULOCYTES NFR BLD AUTO: 0.7 % (ref 0–0.5)
LYMPHOCYTES # BLD AUTO: 1.1 10*3/MM3 (ref 0.7–3.1)
LYMPHOCYTES NFR BLD AUTO: 10.7 % (ref 19.6–45.3)
MCH RBC QN AUTO: 28.9 PG (ref 26.6–33)
MCHC RBC AUTO-ENTMCNC: 31.9 G/DL (ref 31.5–35.7)
MCV RBC AUTO: 90.5 FL (ref 79–97)
MONOCYTES # BLD AUTO: 0.48 10*3/MM3 (ref 0.1–0.9)
MONOCYTES NFR BLD AUTO: 4.7 % (ref 5–12)
NEUTROPHILS NFR BLD AUTO: 8.49 10*3/MM3 (ref 1.7–7)
NEUTROPHILS NFR BLD AUTO: 82.9 % (ref 42.7–76)
NRBC BLD AUTO-RTO: 0 /100 WBC (ref 0–0.2)
PLATELET # BLD AUTO: 173 10*3/MM3 (ref 140–450)
PMV BLD AUTO: 9.7 FL (ref 6–12)
RBC # BLD AUTO: 3.46 10*6/MM3 (ref 3.77–5.28)
WBC NRBC COR # BLD AUTO: 10.24 10*3/MM3 (ref 3.4–10.8)

## 2023-12-23 PROCEDURE — 85025 COMPLETE CBC W/AUTO DIFF WBC: CPT | Performed by: OBSTETRICS & GYNECOLOGY

## 2023-12-23 PROCEDURE — 25010000002 KETOROLAC TROMETHAMINE PER 15 MG: Performed by: OBSTETRICS & GYNECOLOGY

## 2023-12-23 RX ADMIN — OXYCODONE HYDROCHLORIDE 5 MG: 5 TABLET ORAL at 17:58

## 2023-12-23 RX ADMIN — ACETAMINOPHEN 650 MG: 325 TABLET, FILM COATED ORAL at 17:53

## 2023-12-23 RX ADMIN — KETOROLAC TROMETHAMINE 15 MG: 15 INJECTION, SOLUTION INTRAMUSCULAR; INTRAVENOUS at 06:12

## 2023-12-23 RX ADMIN — Medication 1 TABLET: at 10:18

## 2023-12-23 RX ADMIN — ESCITALOPRAM 5 MG: 5 TABLET, FILM COATED ORAL at 21:05

## 2023-12-23 RX ADMIN — KETOROLAC TROMETHAMINE 15 MG: 15 INJECTION, SOLUTION INTRAMUSCULAR; INTRAVENOUS at 00:05

## 2023-12-23 RX ADMIN — ACETAMINOPHEN 1000 MG: 500 TABLET ORAL at 10:18

## 2023-12-23 RX ADMIN — ACETAMINOPHEN 650 MG: 325 TABLET, FILM COATED ORAL at 23:30

## 2023-12-23 RX ADMIN — IBUPROFEN 600 MG: 600 TABLET ORAL at 21:04

## 2023-12-23 RX ADMIN — ACETAMINOPHEN 1000 MG: 500 TABLET ORAL at 02:47

## 2023-12-23 RX ADMIN — IBUPROFEN 600 MG: 600 TABLET ORAL at 13:23

## 2023-12-23 NOTE — PLAN OF CARE
Goal Outcome Evaluation:           Progress: improving  Outcome Evaluation: VSS, up x1 this shift. fundus and lochia wdl. breastfeeding and bonding with infant. pain controlled with ERAS

## 2023-12-23 NOTE — LACTATION NOTE
Pt reports baby latches sometimes. She reports just expressing 0.6 cc of colostrum and syringe feeding it to baby. LC offered to assist pt with latching baby now, pt declined. Encouraged to call LC as needed.    Lactation Consult Note    Evaluation Completed: 2023 07:48 EST  Patient Name: Cydney Soto  :  1990  MRN:  [id number]     REFERRAL  INFORMATION:                                         DELIVERY HISTORY:        Skin to skin initiation date/time: 2023     Skin to skin end date/time:           MATERNAL ASSESSMENT:                               INFANT ASSESSMENT:  Information for the patient's :  Lina Soto [[id number]]   No past medical history on file.                                                                                                   MATERNAL INFANT FEEDING:                                                                       EQUIPMENT TYPE:                                 BREAST PUMPING:          LACTATION REFERRALS:

## 2023-12-23 NOTE — PROGRESS NOTES
[facility]   PROGRESS NOTE    Patient Name: Cydney Soto  :  1990  MRN:  [id number]      Post-Op Day 1 S/P    Delivered a female infant.  Subjective     Patient reports:    Pain is well controlled. Voiding and ambulating without difficulty.    Tolerating po. Lochia normal.       The patient plans to breastfeed.         Objective       Vitals: Vital Signs Range for the last 24 hours  Temperature: Temp:  [94.3 °F (34.6 °C)-98.2 °F (36.8 °C)] 98.2 °F (36.8 °C)   Temp Source: Temp src: Oral   BP: BP: ()/(60-81) 113/71   Pulse: Heart Rate:  [56-84] 83   Respirations: Resp:  [16-18] 16         Intake/Output Summary (Last 24 hours) at 2023 1052  Last data filed at 2023 0200  Gross per 24 hour   Intake 4124.73 ml   Output 3446 ml   Net 678.73 ml                                              Physical Exam     General Alert and awake, in NAD      CV Regular rate and rhythm      Lungs clear to auscultation bilaterally        Abdomen Soft, non-distended, fundus firm,  2fb below umbilicus, appropriately tender      Incision  Dressing clean, dry and intact.      Extremities  no edema, calves NT               LABS: Results from last 7 days   Lab Units 23  0849 23  0801   WBC 10*3/mm3 10.24 8.95   HEMOGLOBIN g/dL 10.0* 11.7*   HEMATOCRIT % 31.3* 34.6   PLATELETS 10*3/mm3 173 232         Prenatal labs results reviewed:  Yes   Rubella:  immune  Rh Status:    RH type   Date Value Ref Range Status   2023 Positive  Final                       Assessment & Plan   :     1. POD 1 S/P C/S: Hemodynamically stable.  Doing well.  Continue routine care.       Pregnancy          Sonali Dawn MD  2023  10:52 EST

## 2023-12-24 RX ORDER — TRANEXAMIC ACID 10 MG/ML
1000 INJECTION, SOLUTION INTRAVENOUS ONCE AS NEEDED
Status: DISCONTINUED | OUTPATIENT
Start: 2023-12-24 | End: 2023-12-25 | Stop reason: HOSPADM

## 2023-12-24 RX ADMIN — ACETAMINOPHEN 650 MG: 325 TABLET, FILM COATED ORAL at 06:31

## 2023-12-24 RX ADMIN — IBUPROFEN 600 MG: 600 TABLET ORAL at 15:01

## 2023-12-24 RX ADMIN — ACETAMINOPHEN 650 MG: 325 TABLET, FILM COATED ORAL at 12:33

## 2023-12-24 RX ADMIN — ACETAMINOPHEN 650 MG: 325 TABLET, FILM COATED ORAL at 18:31

## 2023-12-24 RX ADMIN — IBUPROFEN 600 MG: 600 TABLET ORAL at 21:22

## 2023-12-24 RX ADMIN — IBUPROFEN 600 MG: 600 TABLET ORAL at 02:56

## 2023-12-24 RX ADMIN — IBUPROFEN 600 MG: 600 TABLET ORAL at 08:51

## 2023-12-24 RX ADMIN — Medication 1 TABLET: at 08:51

## 2023-12-24 RX ADMIN — ESCITALOPRAM 5 MG: 5 TABLET, FILM COATED ORAL at 21:51

## 2023-12-24 RX ADMIN — DOCUSATE SODIUM 100 MG: 100 CAPSULE, LIQUID FILLED ORAL at 21:22

## 2023-12-24 NOTE — PROGRESS NOTES
[facility]   PROGRESS NOTE    Patient Name: Cydney Soto  :  1990  MRN:  [id number]      Post-Op 2 S/P   Subjective     Patient reports:   Doing well. Pain well controlled. Tolerating regular diet and having flatus.    Lochia normal.       Objective       Vitals: Vital Signs Range for the last 24 hours  Temperature: Temp:  [97.4 °F (36.3 °C)-98.5 °F (36.9 °C)] 98.5 °F (36.9 °C)       BP: BP: (121-128)/(74-82) 126/82   Pulse: Heart Rate:  [83-85] 83   Respirations: Resp:  [16] 16                                         Physical Exam     General Alert       Abdomen Soft, non-distended, fundus firm, 2 FB below umbilicus, appropriately tender      Incision  Intact, no erythema or exudate      Extremities Calves NT bilaterally                Assessment & Plan  :   1. POD 2  -  Doing well.  Continue usual cares. Had hoped to go home but baby not able to be discharged yet.    2. GDMA2 - used insulin - BS pp have been normal. Will need 2 hr GCT @ 6 wks.     3. Mild anemia - asymptomatic.           Pregnancy               Jair Hsu MD  2023  12:50 EST

## 2023-12-24 NOTE — PLAN OF CARE
Goal Outcome Evaluation:              Outcome Evaluation: Pain controlled. VSS. VOiding without difficulty. Breastfeeding and supplementing.

## 2023-12-25 VITALS
TEMPERATURE: 98.1 F | HEIGHT: 64 IN | HEART RATE: 80 BPM | DIASTOLIC BLOOD PRESSURE: 73 MMHG | OXYGEN SATURATION: 100 % | WEIGHT: 182 LBS | RESPIRATION RATE: 16 BRPM | BODY MASS INDEX: 31.07 KG/M2 | SYSTOLIC BLOOD PRESSURE: 125 MMHG

## 2023-12-25 RX ORDER — HYDROCODONE BITARTRATE AND ACETAMINOPHEN 5; 325 MG/1; MG/1
1 TABLET ORAL EVERY 4 HOURS PRN
Qty: 10 TABLET | Refills: 0 | Status: SHIPPED | OUTPATIENT
Start: 2023-12-25

## 2023-12-25 RX ADMIN — IBUPROFEN 600 MG: 600 TABLET ORAL at 10:43

## 2023-12-25 RX ADMIN — OXYCODONE HYDROCHLORIDE 5 MG: 5 TABLET ORAL at 10:41

## 2023-12-25 RX ADMIN — Medication 1 TABLET: at 10:41

## 2023-12-25 RX ADMIN — ACETAMINOPHEN 650 MG: 325 TABLET, FILM COATED ORAL at 06:27

## 2023-12-25 RX ADMIN — IBUPROFEN 600 MG: 600 TABLET ORAL at 02:49

## 2023-12-25 RX ADMIN — ACETAMINOPHEN 650 MG: 325 TABLET, FILM COATED ORAL at 00:27

## 2023-12-25 NOTE — DISCHARGE SUMMARY
Date of Discharge:  2023    Discharge Diagnosis    Presenting Problem/History of Present Illness  Pregnancy [Z34.90]  Pregnancy [Z34.90]       Hospital Course  Patient is a 33 y.o. female presented with csection...     Procedures Performed  Procedure(s):   SECTION PRIMARY         Condition on Discharge:  Post-Op Day 3 S/P   Subjective     Patient reports:    Doing well - ready for discharge. Pain well controlled. Tolerating po and   having flatus. Voiding and ambulating without difficulty. Lochia normal.     Vital Signs  Temp:  [98.1 °F (36.7 °C)-98.4 °F (36.9 °C)] 98.1 °F (36.7 °C)  Heart Rate:  [74-84] 80  Resp:  [16] 16  BP: (110-125)/(65-87) 125/73    Physical Exam    Gen Alert and awake   Abdomen Soft, ND,  Fundus firm with minimal tenderness   Incision  Intact, without erythema or exudate   Extremities Calves NT bilaterally     Assessment & Plan ]   Assessment:  POD 3  -  Doing well. Stable for discharge.     Plan:    Instructions reviewed       Consults:   Consults       No orders found from 2023 to 2023.            Discharge Disposition      Discharge Medications     Discharge Medications        ASK your doctor about these medications        Instructions Start Date   Accu-Chek Guide test strip  Generic drug: glucose blood   1 each, Other, As Needed      acetaminophen 650 MG 8 hr tablet  Commonly known as: TYLENOL   650 mg, Oral, Every 8 Hours PRN      diphenhydrAMINE 25 mg capsule  Commonly known as: BENADRYL   25 mg, Oral, Every 6 Hours PRN      escitalopram 5 MG tablet  Commonly known as: LEXAPRO   1 tablet, Oral, Daily      NovoLIN N FlexPen 100 UNIT/ML injection  Generic drug: Insulin NPH (Human) (Isophane)   16 Units, Subcutaneous, Nightly      prenatal (CLASSIC) vitamin 28-0.8 MG tablet tablet  Generic drug: prenatal vitamin   Oral, Daily      Progesterone 200 MG capsule  Commonly known as: PROMETRIUM   200 mg, Vaginal, Nightly               The patient has been  prescribed a controlled substance.  She has been counseled on the risks associated with using the medication.   The addictive potential of this medication and alternatives were discussed carefully with this patient and she demonstrated understanding.  A CHRIS report has been obtained and reviewed.    Activity at Discharge: restrictions reviewed    Follow-up Appointments  No future appointments.      Test Results Pending at Discharge       Esther Moore MD  12/25/23  09:46 EST

## 2023-12-25 NOTE — PLAN OF CARE
Goal Outcome Evaluation:  Plan of Care Reviewed With: patient, spouse        Progress: improving       Vital signs stable. Fundus firm, lochia light. Pain is controlled with scheduled medication. Up ad rivka in room. Voiding without difficulty. Tolerating regular diet. Pt anticipates discharge home today.

## 2023-12-25 NOTE — LACTATION NOTE
This note was copied from a baby's chart.  P1,t GDM, mom's milk is coming in. She pumped 60 ml total from both breasts and is not having to use formula the last 3 feeds. Changed flange to 21 mm. Encouraged mom to bf every 2-3 hours and pump if baby doesn't latch. Referred to bf education in the Postpartum handbook pages 35-45, OPLC information, d/c information rt weight, stool and voiding expectations, feeding a minimum of 8 times in 24 hours, expect milk supply by 3-5 days, signs of a proper latch, and engorgement management.  She has a PBP and LC number is on board.

## (undated) DEVICE — TRY SKINPREP DRYPREP

## (undated) DEVICE — GLV SURG BIOGEL LTX PF 6

## (undated) DEVICE — SUT ETHLN 2/0 FS 18IN 664H

## (undated) DEVICE — GLV CARPAL TUNNEL IMPACTO 1/2FNGR SYNTH LG

## (undated) DEVICE — SUT VIC 4/0 KS 27IN VCP662H

## (undated) DEVICE — 3M™ STERI-STRIP™ COMPOUND BENZOIN TINCTURE 40 BAGS/CARTON 4 CARTONS/CASE C1544: Brand: 3M™ STERI-STRIP™

## (undated) DEVICE — SUT VIC 2/0 CT2 27IN J269H

## (undated) DEVICE — SUT NLY 2/0 664G

## (undated) DEVICE — ANTIBACTERIAL UNDYED BRAIDED (POLYGLACTIN 910), SYNTHETIC ABSORBABLE SUTURE: Brand: COATED VICRYL

## (undated) DEVICE — STRAP STIRUP WO/ RNG

## (undated) DEVICE — GLV SURG BIOGEL LTX PF 6 1/2

## (undated) DEVICE — MEDI-VAC YANKAUER SUCTION HANDLE W/BULBOUS TIP: Brand: CARDINAL HEALTH

## (undated) DEVICE — PREP SOL POVIDONE/IODINE BT 4OZ

## (undated) DEVICE — SLV SCD CALF HEMOFORCE DVT THERP REPROC MD

## (undated) DEVICE — SUCTION CANISTER, 1000CC,SAFELINER: Brand: DEROYAL

## (undated) DEVICE — GAUZE,SPONGE,FLUFF,6"X6.75",STRL,5/TRAY: Brand: MEDLINE

## (undated) DEVICE — TP SILK DURAPORE 2 IN

## (undated) DEVICE — SUT GUT CHRM 0 CT 27IN 914H

## (undated) DEVICE — SUT VIC 2/0 CT1 36IN

## (undated) DEVICE — LAG MINOR PROCEDURE: Brand: MEDLINE INDUSTRIES, INC.

## (undated) DEVICE — SOL PVPI ANTISEPT SCRB 4OZ

## (undated) DEVICE — STRIP,CLOSURE,WOUND,MEDI-STRIP,1/2X4: Brand: MEDLINE

## (undated) DEVICE — SOL IRR NACL 0.9PCT BT 1000ML

## (undated) DEVICE — LOU D & C HYSTEROSCOPY: Brand: MEDLINE INDUSTRIES, INC.

## (undated) DEVICE — SUT SILK 2/0 FS BLK 18IN 685G

## (undated) DEVICE — GLV SURG SIGNATURE ESSENTIAL PF LTX SZ8